# Patient Record
Sex: MALE | Race: AMERICAN INDIAN OR ALASKA NATIVE
[De-identification: names, ages, dates, MRNs, and addresses within clinical notes are randomized per-mention and may not be internally consistent; named-entity substitution may affect disease eponyms.]

---

## 2018-06-29 ENCOUNTER — HOSPITAL ENCOUNTER (EMERGENCY)
Dept: HOSPITAL 42 - ED | Age: 53
Discharge: HOME | End: 2018-06-29
Payer: MEDICAID

## 2018-06-29 VITALS — BODY MASS INDEX: 32.9 KG/M2

## 2018-06-29 VITALS — HEART RATE: 85 BPM | TEMPERATURE: 98 F | RESPIRATION RATE: 18 BRPM

## 2018-06-29 VITALS — OXYGEN SATURATION: 99 % | SYSTOLIC BLOOD PRESSURE: 170 MMHG | DIASTOLIC BLOOD PRESSURE: 65 MMHG

## 2018-06-29 DIAGNOSIS — J02.9: Primary | ICD-10-CM

## 2018-06-29 NOTE — ED PDOC
Arrival/HPI





- General


Time Seen by Provider: 06/29/18 18:34


Historian: Patient





- History of Present Illness


Narrative History of Present Illness (Text): 





06/29/18 18:35


53yo male with pmhx of hypertension who present with complaint of sore throat x 

one week. States sore throat started after having a flu a week ago. he states 

he saw his PMD for the sore throat and was given ASA which is not helping. 

Reports odynophagia. Denies dysphagia, hoarseness, drooling, fever, chills, 

abdominal pain, nausea, vomiting, any other complaint.





Past Medical History





- Provider Review


Nursing Documentation Reviewed: Yes





Family/Social History





- Physician Review


Nursing Documentation Reviewed: Yes


Family/Social History: Unknown Family HX





Allergies/Home Meds


Allergies/Adverse Reactions: 


Allergies





No Known Allergies Allergy (Verified 06/29/18 18:35)


 











Review of Systems





- Physician Review


All systems were reviewed & negative as marked: Yes





- Review of Systems


Constitutional: Normal


Eyes: Normal


ENT: Sore Throat.  absent: Tinnitus, TMJ Pain, Voice Changes, Epistaxis, Sinus 

Congestion


Respiratory: Normal


Cardiovascular: Normal


Gastrointestinal: Normal


Genitourinary Male: Normal


Musculoskeletal: Normal


Skin: Normal


Neurological: Normal


Endocrine: Normal


Hemo/Lymphatic: Normal


Psychiatric: Normal





Physical Exam


Vital Signs Reviewed: Yes


Vital Signs











  Temp Pulse Resp BP Pulse Ox


 


 06/29/18 19:34  98.0 F  85  18  170/65 H  99


 


 06/29/18 18:48  98.0 F  85  18  187/95 H  98











Temperature: Afebrile


Blood Pressure: Normal


Pulse: Regular


Respiratory Rate: Normal


Appearance: Positive for: Well-Appearing, Non-Toxic, Comfortable


Pain Distress: None


Mental Status: Positive for: Alert and Oriented X 3





- Systems Exam


Head: Present: Atraumatic, Normocephalic


Pupils: Present: PERRL


Extroacular Muscles: Present: EOMI


Conjunctiva: Present: Normal


Mouth: Present: Moist Mucous Membranes


Pharnyx: Present: Normal.  No: ERYTHEMA, EXUDATE, TONSILS ENLARGED, 

Peritonsilar Swelling, Uvular Deviation, Muffled/Hoarse Voice, Soft Palate/

Uvular Edema


Neck: Present: Normal Range of Motion


Respiratory/Chest: Present: Clear to Auscultation, Good Air Exchange.  No: 

Respiratory Distress, Accessory Muscle Use


Cardiovascular: Present: Regular Rate and Rhythm, Normal S1, S2.  No: Murmurs


Abdomen: No: Tenderness, Distention, Peritoneal Signs


Back: Present: Normal Inspection


Upper Extremity: Present: Normal Inspection.  No: Cyanosis, Edema


Lower Extremity: Present: Normal Inspection.  No: Edema


Neurological: Present: GCS=15, CN II-XII Intact, Speech Normal


Skin: Present: Warm, Dry, Normal Color.  No: Rashes


Psychiatric: Present: Alert, Oriented x 3, Normal Insight, Normal Concentration





Medical Decision Making


ED Course and Treatment: 





06/29/18 19:51


PT was not in any distress. He was controlling his secretions. No drooling. No 

trismus. No stridor. No neck pain. No meningeal sign in ED.





Rapid strep was negative


He was treated with viscus lido and dc home with magic mouth wash. Referred to 

ENT





- Lab Interpretations


Lab Results: 


 Lab Results





06/29/18 18:49: Grp A Beta Strep Ag Negative











- Medication Orders


Current Medication Orders: 











Discontinued Medications





Lidocaine HCl (Lidocaine 2% Viscous)  15 ml PO ONCE STA


   Stop: 06/29/18 19:26


   Last Admin: 06/29/18 19:33  Dose: 15 ml











Disposition/Present on Arrival





- Present on Arrival


Any Indicators Present on Arrival: No


History of DVT/PE: No


History of Uncontrolled Diabetes: No


Urinary Catheter: No


History of Decub. Ulcer: No


History Surgical Site Infection Following: None





- Disposition


Have Diagnosis and Disposition been Completed?: Yes


Diagnosis: 


 Sore throat





Disposition: HOME/ ROUTINE


Disposition Time: 19:30


Patient Plan: Discharge


Condition: STABLE


Discharge Instructions (ExitCare):  Sore Throat in Adults


Additional Instructions: 


Follow up with your doctor/ENT


Return to ED for any new or worsening symptoms


Prescriptions: 


Benzocaine [Mouth Pain] 14.7 ml MM BID #100 liquid


Referrals: 


Juvenal Amin DO [[facility]] - Follow up with primary

## 2018-11-29 NOTE — US
Date of service: 



11/29/2018



PROCEDURE:  Ultrasound of the Kidneys



HISTORY:

CKD



COMPARISON:

None available.



TECHNIQUE:

Sonogram of the kidneys.



FINDINGS:



RIGHT KIDNEY:

Measures: 9.2 x 5 x 5.15 cm. 



Diffuse increased echogenicity of the right kidney noted.  No 

evidence of hydronephrosis. 



No stone, solid mass lesion or hydronephrosis visualized. 



LEFT KIDNEY:

Measures: 8.1 x 4.6 x 4.1 cm. 



Diffuse increased echogenicity of the left kidney is also noted. 



No stone, solid mass lesion or hydronephrosis visualized. 



OTHER FINDINGS:

Incidentally noted is a heterogeneous echogenic mass lesion at the 

right liver lobe measures 6.7 x 5.5 x 5.9 centimeter.



IMPRESSION:

Bilateral echogenic kidneys suggestive of medical renal disease.  

There is no evidence of hydronephrosis.



Incidentally noted is possible mass lesion at the right liver lobe.  

Further assessment by CT is suggested if clinically warranted.

## 2018-11-29 NOTE — CP.PCM.HP
<Juan Pablo Stephens - Last Filed: 11/29/18 20:14>





History of Present Illness





- History of Present Illness


History of Present Illness: 





HISTORY & PHYSICAL NOTE FOR DR. RAFAEL Stephens PGY1





52 y/o M with PMHx of IDDM (diagnosed 30+ yrs ago), HTN (~3 yrs), HLD (~3yrs), 

CKD, chronic anemia presents to [facility] with complaints of shortness of breath that 

hes noticed for the past 3 days that worsened while laying down at night and 

walking. He also noticed increased abdominal distention, and worsening SOB when 

laying on stomach. He started noticing shortness of breath symptoms about 6 

months ago, but visited ED today as the SOB has significantly bothered him. He 

sleeps with 2 pillows at night. He also reports a fall about 3 days ago while 

jumping, however denies loc, dizziness, seizure like activity. He recently moved

to [place] from [place], was previously seeing a nephrologist and PMD, however has no

established care in [place]. He was told by his nephrologist about his worsening

creatinine, however has not underwent dialysis. He reports he has chronic 

anemia, and underwent a colonoscopy but doesnt recall results. He reports 

undergoing cardiac cath, and reports no lesions. He denies fevers, chills, 

headache, dizziness, nausea, vomiting, weight gain, chest pain, palpitations, 

cough, nausea, vomiting, diarrhea, hematochezia.





In the ED: patient had urinated ~700cc for urine studies. EKG revealed NSR, no 

peak T waves. 





PMHx: CKD, IDDM, HTN, HLD, Anemia 


All: NKDA


PSH: Unspecified nose surgery


SH: Former smoker: <1 pack/day x 10 years. Occasional beer. Lives alone, 

independent


FH: Mother: MI (51 y/o), Father: CABG, Daughter: Anemia


Meds: ASA 81mg po daily, Nifedipine ER 60mg po daily, losartan 100mg po daily, 

atorvastatin 80mg daily, humalin 70/30 40u bid, labetalol 100mg bid


PMD: Dr. Miguelangel Saunders ([facility])


Pharmacy: [facility]





Present on Admission





- Present on Admission


Any Indicators Present on Admission: No





Review of Systems





- Review of Systems


Review of Systems: 





per HPI





Past Patient History





- Infectious Disease


Hx of Infectious Diseases: None





- Past Social History


Smoking Status: Never Smoked





- CARDIAC


Hx Cardiac Disorders: Yes


Hx Hypertension: Yes





- PULMONARY


Hx Respiratory Disorders: No





- NEUROLOGICAL


Hx Neurological Disorder: No





- HEENT


Hx HEENT Problems: No





- RENAL


Hx Chronic Kidney Disease: No





- ENDOCRINE/METABOLIC


Hx Diabetes Mellitus Type 1: Yes





- HEMATOLOGICAL/ONCOLOGICAL


Hx Blood Disorders: No





- INTEGUMENTARY


Hx Dermatological Problems: No





- MUSCULOSKELETAL/RHEUMATOLOGICAL


Hx Musculoskeletal Disorders: No





- GASTROINTESTINAL


Hx Gastrointestinal Disorders: No





- GENITOURINARY/GYNECOLOGICAL


Hx Genitourinary Disorders: No





- PSYCHIATRIC


Hx Psychophysiologic Disorder: No


Hx Substance Use: No





- SURGICAL HISTORY


Hx Surgeries: No





- ANESTHESIA


Hx Anesthesia: No





Meds


Allergies/Adverse Reactions: 


                                    Allergies











Allergy/AdvReac Type Severity Reaction Status Date / Time


 


No Known Allergies Allergy   Verified 06/29/18 18:35














Physical Exam





- Constitutional


Appears: Well, Non-toxic, No Acute Distress





- Head Exam


Head Exam: ATRAUMATIC, NORMAL INSPECTION





- Eye Exam


Eye Exam: EOMI, Normal appearance





- ENT Exam


ENT Exam: Mucous Membranes Moist, Normal Exam





- Neck Exam


Neck exam: Positive for: Normal Inspection





- Respiratory Exam


Respiratory Exam: Clear to Auscultation Bilateral, NORMAL BREATHING PATTERN





- Cardiovascular Exam


Cardiovascular Exam: REGULAR RHYTHM, +S1, +S2





- GI/Abdominal Exam


GI & Abdominal Exam: Distended, Normal Bowel Sounds, Soft





- Extremities Exam


Extremities exam: Positive for: normal inspection.  Negative for: calf 

tenderness





- Back Exam


Back exam: NORMAL INSPECTION





- Neurological Exam


Neurological exam: Alert, Oriented x3





- Psychiatric Exam


Psychiatric exam: Normal Affect, Normal Mood





- Skin


Skin Exam: Dry, Intact, Warm





Results





- Vital Signs


Recent Vital Signs: 





                                Last Vital Signs











Temp  97.8 F   11/29/18 09:57


 


Pulse  95 H  11/29/18 09:57


 


Resp  18   11/29/18 10:14


 


BP  149/78   11/29/18 11:52


 


Pulse Ox  99   11/29/18 09:57














- Labs


Result Diagrams: 


                                 11/29/18 10:40





                                 11/29/18 10:40


Labs: 





                         Laboratory Results - last 24 hr











  11/29/18 11/29/18 11/29/18





  10:40 10:40 10:40


 


WBC  8.5  


 


RBC  2.49 L  


 


Hgb  7.5 L  


 


Hct  21.6 L  


 


MCV  86.7  


 


MCH  30.1  


 


MCHC  34.7  


 


RDW  12.5  


 


Plt Count  206  


 


MPV  11.1 H  


 


Gran %  73.5 H  


 


Lymph % (Auto)  17.8 L  


 


Mono % (Auto)  6.4 H  


 


Eos % (Auto)  2.2  


 


Baso % (Auto)  0.1  


 


Gran #  6.23  


 


Lymph # (Auto)  1.5  


 


Mono # (Auto)  0.5  


 


Eos # (Auto)  0.2  


 


Baso # (Auto)  0.01  


 


D-Dimer, Quantitative   707 H 


 


Sodium    138


 


Potassium    5.4 H


 


Chloride    109 H


 


Carbon Dioxide    18 L


 


Anion Gap    17


 


BUN    84 H


 


Creatinine    12.9 H*


 


Est GFR ( Amer)    5


 


Est GFR (Non-Af Amer)    4


 


Random Glucose    69 L


 


Calcium    5.6 L*


 


Magnesium    1.3 L


 


Total Bilirubin    0.6


 


AST    33


 


ALT    45


 


Alkaline Phosphatase    134 H


 


Lactate Dehydrogenase    1264 H


 


Total Creatine Kinase    3158 H


 


CK-MB (CK-2)    9.7 H


 


CK-MB (CK-2) %    0.3 L


 


Troponin I    0.03


 


NT-Pro-B Natriuret Pep    9500 H


 


Total Protein    6.9


 


Albumin    3.4


 


Globulin    3.5


 


Albumin/Globulin Ratio    1.0 L


 


Blood Type   


 


Antibody Screen   


 


Crossmatch   


 


BBK History Checked   














  11/29/18





  11:35


 


WBC 


 


RBC 


 


Hgb 


 


Hct 


 


MCV 


 


MCH 


 


MCHC 


 


RDW 


 


Plt Count 


 


MPV 


 


Gran % 


 


Lymph % (Auto) 


 


Mono % (Auto) 


 


Eos % (Auto) 


 


Baso % (Auto) 


 


Gran # 


 


Lymph # (Auto) 


 


Mono # (Auto) 


 


Eos # (Auto) 


 


Baso # (Auto) 


 


D-Dimer, Quantitative 


 


Sodium 


 


Potassium 


 


Chloride 


 


Carbon Dioxide 


 


Anion Gap 


 


BUN 


 


Creatinine 


 


Est GFR ( Amer) 


 


Est GFR (Non-Af Amer) 


 


Random Glucose 


 


Calcium 


 


Magnesium 


 


Total Bilirubin 


 


AST 


 


ALT 


 


Alkaline Phosphatase 


 


Lactate Dehydrogenase 


 


Total Creatine Kinase 


 


CK-MB (CK-2) 


 


CK-MB (CK-2) % 


 


Troponin I 


 


NT-Pro-B Natriuret Pep 


 


Total Protein 


 


Albumin 


 


Globulin 


 


Albumin/Globulin Ratio 


 


Blood Type  B POSITIVE


 


Antibody Screen  Negative


 


Crossmatch  See Detail


 


BBK History Checked  No verified bt














Assessment & Plan





- Assessment and Plan (Free Text)


Assessment: 





52 y/o M with PMHx CKD, IDDM, HTN, HLD admitted for hyperkalemia, chf 

exacerbation, rhabdomyolysis in the setting of acute on chronic kidney failure


Plan: 





Acute on chronic kidney failure


No acute need for RRT


NS@ 100mls/hr


start sodium bicarbonate 650po tid


consult nephrology, appreciate recs


Urine studies: electrolytes, 24hr creatinine, total protein, upep,  


Hepatitis panel


Vitamin D level, uric acid, 


Nephro consulted: recs appreciated


I&Os





r/o CHF Exacerbation


BNP elevated


Echo in am to r/o CHF





Hyperkalemia


No EKG changes


calcium gluconate 1g


Kayexalate stat





Anemia of chronic kidney disease


f/u iron studies


type & Screen


transfuse as necessary





Hx of HTN


continue home labetalol


continue home nifedipine





Hx of IDDM


sliding scale insulin





DVT ppx: SCD





Case seen, examined and discussed with attending physician, Dr. Lee











<Khadra Lee - Last Filed: 11/30/18 16:08>





Results





- Vital Signs


Recent Vital Signs: 





                                Last Vital Signs











Temp  98 F   11/30/18 12:03


 


Pulse  90   11/30/18 13:32


 


Resp  12   11/30/18 12:03


 


BP  185/90 H  11/30/18 13:32


 


Pulse Ox  96   11/30/18 12:03














- Labs


Result Diagrams: 


                                 11/30/18 06:00





                                 11/30/18 06:00


Labs: 





                         Laboratory Results - last 24 hr











  11/29/18 11/29/18 11/29/18





  15:37 16:20 16:34


 


WBC   


 


RBC   


 


Hgb   


 


Hct   


 


MCV   


 


MCH   


 


MCHC   


 


RDW   


 


Plt Count   


 


MPV   


 


Gran %   


 


Lymph % (Auto)   


 


Mono % (Auto)   


 


Eos % (Auto)   


 


Baso % (Auto)   


 


Gran #   


 


Lymph # (Auto)   


 


Mono # (Auto)   


 


Eos # (Auto)   


 


Baso # (Auto)   


 


Retic Count   


 


PT   


 


INR   


 


APTT   


 


Sodium   


 


Potassium   


 


Chloride   


 


Carbon Dioxide   


 


Anion Gap   


 


BUN   


 


Creatinine   


 


Est GFR ( Amer)   


 


Est GFR (Non-Af Amer)   


 


POC Glucose (mg/dL)   


 


Random Glucose   


 


Serum Osmolality   


 


Uric Acid   


 


Calcium   


 


Phosphorus   


 


Magnesium   


 


Iron    61


 


TIBC    278


 


% Saturation    22


 


Ferritin   


 


Total Bilirubin   


 


AST   


 


ALT   


 


Alkaline Phosphatase   


 


Total Creatine Kinase   


 


CK-MB (CK-2)   


 


CK-MB (CK-2) %   


 


Total Protein   


 


Total Protein (PEP)   


 


Albumin   


 


Albumin (PEP)   


 


Globulin   


 


Albumin/Globulin Ratio   


 


Alpha-1-Globulins   


 


Alpha-2-Globulins   


 


Beta-1-Globulin   


 


Beta-2-Globulin   


 


Gamma Globulins   


 


Abnorm Protein Band 1   


 


Abnorm Protein Band 2   


 


Abnorm Protein Band 3   


 


25-OH Vitamin D Total   


 


Urine Osmolality   180 L 


 


Ur Random Creatinine   45 


 


Ur Random Sodium   37 


 


FILEMON & SPEP Interp   


 


ADRIANA Screen   


 


ADRIANA Titer   


 


ADRIANA Titer 2   


 


ADRIANA Pattern   


 


ADRIANA Pattern 2   


 


Complement C3   


 


Complement C4   


 


Hepatitis A IgM Ab   


 


Hep Bs Antigen   


 


Hep B Core IgM Ab   


 


Hepatitis C Antibody   


 


HIV 1&2 Ag/Ab, 4th Gen   


 


Blood Type Confirm  B POSITIVE  














  11/29/18 11/29/18 11/29/18





  16:34 16:34 16:34


 


WBC   


 


RBC   


 


Hgb   


 


Hct   


 


MCV   


 


MCH   


 


MCHC   


 


RDW   


 


Plt Count   


 


MPV   


 


Gran %   


 


Lymph % (Auto)   


 


Mono % (Auto)   


 


Eos % (Auto)   


 


Baso % (Auto)   


 


Gran #   


 


Lymph # (Auto)   


 


Mono # (Auto)   


 


Eos # (Auto)   


 


Baso # (Auto)   


 


Retic Count   


 


PT   12.7 H 


 


INR   1.11 


 


APTT   29.5 


 


Sodium   


 


Potassium   


 


Chloride   


 


Carbon Dioxide   


 


Anion Gap   


 


BUN   


 


Creatinine   


 


Est GFR ( Amer)   


 


Est GFR (Non-Af Amer)   


 


POC Glucose (mg/dL)   


 


Random Glucose   


 


Serum Osmolality   


 


Uric Acid  7.6  


 


Calcium   


 


Phosphorus   


 


Magnesium   


 


Iron   


 


TIBC   


 


% Saturation   


 


Ferritin  93.1  


 


Total Bilirubin   


 


AST   


 


ALT   


 


Alkaline Phosphatase   


 


Total Creatine Kinase   


 


CK-MB (CK-2)   


 


CK-MB (CK-2) %   


 


Total Protein   


 


Total Protein (PEP)   


 


Albumin   


 


Albumin (PEP)   


 


Globulin   


 


Albumin/Globulin Ratio   


 


Alpha-1-Globulins   


 


Alpha-2-Globulins   


 


Beta-1-Globulin   


 


Beta-2-Globulin   


 


Gamma Globulins   


 


Abnorm Protein Band 1   


 


Abnorm Protein Band 2   


 


Abnorm Protein Band 3   


 


25-OH Vitamin D Total   


 


Urine Osmolality   


 


Ur Random Creatinine   


 


Ur Random Sodium   


 


FILEMON & SPEP Interp   


 


ADRIANA Screen   


 


ADRIANA Titer   


 


ADRIANA Titer 2   


 


ADRIANA Pattern   


 


ADRIANA Pattern 2   


 


Complement C3   


 


Complement C4   


 


Hepatitis A IgM Ab    Negative


 


Hep Bs Antigen    Negative


 


Hep B Core IgM Ab    Negative


 


Hepatitis C Antibody    Negative


 


HIV 1&2 Ag/Ab, 4th Gen   


 


Blood Type Confirm   














  11/29/18 11/29/18 11/29/18





  16:34 16:34 16:34


 


WBC   


 


RBC   


 


Hgb   


 


Hct   


 


MCV   


 


MCH   


 


MCHC   


 


RDW   


 


Plt Count   


 


MPV   


 


Gran %   


 


Lymph % (Auto)   


 


Mono % (Auto)   


 


Eos % (Auto)   


 


Baso % (Auto)   


 


Gran #   


 


Lymph # (Auto)   


 


Mono # (Auto)   


 


Eos # (Auto)   


 


Baso # (Auto)   


 


Retic Count   


 


PT   


 


INR   


 


APTT   


 


Sodium   


 


Potassium   


 


Chloride   


 


Carbon Dioxide   


 


Anion Gap   


 


BUN   


 


Creatinine   


 


Est GFR ( Amer)   


 


Est GFR (Non-Af Amer)   


 


POC Glucose (mg/dL)   


 


Random Glucose   


 


Serum Osmolality   314 H 


 


Uric Acid   


 


Calcium   


 


Phosphorus   


 


Magnesium   


 


Iron   


 


TIBC   


 


% Saturation   


 


Ferritin   


 


Total Bilirubin   


 


AST   


 


ALT   


 


Alkaline Phosphatase   


 


Total Creatine Kinase   


 


CK-MB (CK-2)   


 


CK-MB (CK-2) %   


 


Total Protein   


 


Total Protein (PEP)    5.7 L


 


Albumin   


 


Albumin (PEP)    2.7 L


 


Globulin   


 


Albumin/Globulin Ratio   


 


Alpha-1-Globulins    0.4 H


 


Alpha-2-Globulins    0.8


 


Beta-1-Globulin    0.3 L


 


Beta-2-Globulin    0.3


 


Gamma Globulins    1.1


 


Abnorm Protein Band 1    TEST NOT PERFORMED


 


Abnorm Protein Band 2    TEST NOT PERFORMED


 


Abnorm Protein Band 3    TEST NOT PERFORMED


 


25-OH Vitamin D Total  < 12.8 L  


 


Urine Osmolality   


 


Ur Random Creatinine   


 


Ur Random Sodium   


 


FILEMON & SPEP Interp    See note


 


ADRIANA Screen   


 


ADRIANA Titer   


 


ADRIANA Titer 2   


 


ADRIANA Pattern   


 


ADRIANA Pattern 2   


 


Complement C3   


 


Complement C4   


 


Hepatitis A IgM Ab   


 


Hep Bs Antigen   


 


Hep B Core IgM Ab   


 


Hepatitis C Antibody   


 


HIV 1&2 Ag/Ab, 4th Gen   


 


Blood Type Confirm   














  11/29/18 11/30/18 11/30/18





  16:34 05:33 06:00


 


WBC    7.8


 


RBC    2.34 L


 


Hgb    7.0 L


 


Hct    20.4 L*


 


MCV    87.2


 


MCH    29.9


 


MCHC    34.3


 


RDW    12.9


 


Plt Count    194


 


MPV    12.1 H


 


Gran %    76.9 H


 


Lymph % (Auto)    15.6 L


 


Mono % (Auto)    5.3


 


Eos % (Auto)    2.1


 


Baso % (Auto)    0.1


 


Gran #    5.96


 


Lymph # (Auto)    1.2


 


Mono # (Auto)    0.4


 


Eos # (Auto)    0.2


 


Baso # (Auto)    0.01


 


Retic Count    2.37 H


 


PT   


 


INR   


 


APTT   


 


Sodium   


 


Potassium   


 


Chloride   


 


Carbon Dioxide   


 


Anion Gap   


 


BUN   


 


Creatinine   


 


Est GFR ( Amer)   


 


Est GFR (Non-Af Amer)   


 


POC Glucose (mg/dL)   122 H 


 


Random Glucose   


 


Serum Osmolality   


 


Uric Acid   


 


Calcium   


 


Phosphorus   


 


Magnesium   


 


Iron   


 


TIBC   


 


% Saturation   


 


Ferritin   


 


Total Bilirubin   


 


AST   


 


ALT   


 


Alkaline Phosphatase   


 


Total Creatine Kinase   


 


CK-MB (CK-2)   


 


CK-MB (CK-2) %   


 


Total Protein   


 


Total Protein (PEP)   


 


Albumin   


 


Albumin (PEP)   


 


Globulin   


 


Albumin/Globulin Ratio   


 


Alpha-1-Globulins   


 


Alpha-2-Globulins   


 


Beta-1-Globulin   


 


Beta-2-Globulin   


 


Gamma Globulins   


 


Abnorm Protein Band 1   


 


Abnorm Protein Band 2   


 


Abnorm Protein Band 3   


 


25-OH Vitamin D Total   


 


Urine Osmolality   


 


Ur Random Creatinine   


 


Ur Random Sodium   


 


FILEMON & SPEP Interp   


 


ADRIANA Screen  Negative  


 


ADRIANA Titer  TEST NOT PERFORMED  


 


ADRIANA Titer 2  TEST NOT PERFORMED  


 


ADRIANA Pattern  TEST NOT PERFORMED  


 


ADRIANA Pattern 2  TEST NOT PERFORMED  


 


Complement C3   


 


Complement C4   


 


Hepatitis A IgM Ab   


 


Hep Bs Antigen   


 


Hep B Core IgM Ab   


 


Hepatitis C Antibody   


 


HIV 1&2 Ag/Ab, 4th Gen  Nonreactive  


 


Blood Type Confirm   














  11/30/18 11/30/18 11/30/18





  06:00 06:00 06:00


 


WBC   


 


RBC   


 


Hgb   


 


Hct   


 


MCV   


 


MCH   


 


MCHC   


 


RDW   


 


Plt Count   


 


MPV   


 


Gran %   


 


Lymph % (Auto)   


 


Mono % (Auto)   


 


Eos % (Auto)   


 


Baso % (Auto)   


 


Gran #   


 


Lymph # (Auto)   


 


Mono # (Auto)   


 


Eos # (Auto)   


 


Baso # (Auto)   


 


Retic Count   


 


PT  13.1 H  


 


INR  1.14  


 


APTT   


 


Sodium   137 


 


Potassium   5.4 H 


 


Chloride   110 H 


 


Carbon Dioxide   18 L 


 


Anion Gap   14 


 


BUN   81 H 


 


Creatinine   12.7 H* 


 


Est GFR ( Amer)   5 


 


Est GFR (Non-Af Amer)   4 


 


POC Glucose (mg/dL)   


 


Random Glucose   123 H 


 


Serum Osmolality   


 


Uric Acid   


 


Calcium   5.7 L* 


 


Phosphorus    6.9 H


 


Magnesium   


 


Iron   


 


TIBC   


 


% Saturation   


 


Ferritin    94.2


 


Total Bilirubin   0.5 


 


AST   30 


 


ALT   35 


 


Alkaline Phosphatase   118 


 


Total Creatine Kinase    2713 H


 


CK-MB (CK-2)    7.1 H


 


CK-MB (CK-2) %    0.3 L


 


Total Protein   6.5 


 


Total Protein (PEP)   


 


Albumin   3.1 


 


Albumin (PEP)   


 


Globulin   3.4 


 


Albumin/Globulin Ratio   0.9 L 


 


Alpha-1-Globulins   


 


Alpha-2-Globulins   


 


Beta-1-Globulin   


 


Beta-2-Globulin   


 


Gamma Globulins   


 


Abnorm Protein Band 1   


 


Abnorm Protein Band 2   


 


Abnorm Protein Band 3   


 


25-OH Vitamin D Total   


 


Urine Osmolality   


 


Ur Random Creatinine   


 


Ur Random Sodium   


 


FILEMON & SPEP Interp   


 


ADRIAAN Screen   


 


ADRIANA Titer   


 


ADRIANA Titer 2   


 


ADRIANA Pattern   


 


ADRIANA Pattern 2   


 


Complement C3   


 


Complement C4   


 


Hepatitis A IgM Ab   


 


Hep Bs Antigen   


 


Hep B Core IgM Ab   


 


Hepatitis C Antibody   


 


HIV 1&2 Ag/Ab, 4th Gen   


 


Blood Type Confirm   














  11/30/18 11/30/18 11/30/18





  06:00 06:00 06:00


 


WBC   


 


RBC   


 


Hgb   


 


Hct   


 


MCV   


 


MCH   


 


MCHC   


 


RDW   


 


Plt Count   


 


MPV   


 


Gran %   


 


Lymph % (Auto)   


 


Mono % (Auto)   


 


Eos % (Auto)   


 


Baso % (Auto)   


 


Gran #   


 


Lymph # (Auto)   


 


Mono # (Auto)   


 


Eos # (Auto)   


 


Baso # (Auto)   


 


Retic Count   


 


PT   


 


INR   


 


APTT   


 


Sodium   


 


Potassium   


 


Chloride   


 


Carbon Dioxide   


 


Anion Gap   


 


BUN   


 


Creatinine   


 


Est GFR ( Amer)   


 


Est GFR (Non-Af Amer)   


 


POC Glucose (mg/dL)   


 


Random Glucose   


 


Serum Osmolality   


 


Uric Acid   


 


Calcium   


 


Phosphorus    6.9 H


 


Magnesium    1.6 L


 


Iron  64  


 


TIBC  265  


 


% Saturation  24  


 


Ferritin   


 


Total Bilirubin   


 


AST   


 


ALT   


 


Alkaline Phosphatase   


 


Total Creatine Kinase   


 


CK-MB (CK-2)   


 


CK-MB (CK-2) %   


 


Total Protein   


 


Total Protein (PEP)   


 


Albumin   


 


Albumin (PEP)   


 


Globulin   


 


Albumin/Globulin Ratio   


 


Alpha-1-Globulins   


 


Alpha-2-Globulins   


 


Beta-1-Globulin   


 


Beta-2-Globulin   


 


Gamma Globulins   


 


Abnorm Protein Band 1   


 


Abnorm Protein Band 2   


 


Abnorm Protein Band 3   


 


25-OH Vitamin D Total   


 


Urine Osmolality   


 


Ur Random Creatinine   


 


Ur Random Sodium   


 


FILEMON & SPEP Interp   


 


ADRIANA Screen   


 


ADRIANA Titer   


 


ADRIANA Titer 2   


 


ADRIANA Pattern   


 


ADRIANA Pattern 2   


 


Complement C3   99.0 


 


Complement C4   32.9 


 


Hepatitis A IgM Ab   


 


Hep Bs Antigen   


 


Hep B Core IgM Ab   


 


Hepatitis C Antibody   


 


HIV 1&2 Ag/Ab, 4th Gen   


 


Blood Type Confirm   














  11/30/18 11/30/18





  07:43 12:37


 


WBC  


 


RBC  


 


Hgb  


 


Hct  


 


MCV  


 


MCH  


 


MCHC  


 


RDW  


 


Plt Count  


 


MPV  


 


Gran %  


 


Lymph % (Auto)  


 


Mono % (Auto)  


 


Eos % (Auto)  


 


Baso % (Auto)  


 


Gran #  


 


Lymph # (Auto)  


 


Mono # (Auto)  


 


Eos # (Auto)  


 


Baso # (Auto)  


 


Retic Count  


 


PT  


 


INR  


 


APTT  


 


Sodium  


 


Potassium  


 


Chloride  


 


Carbon Dioxide  


 


Anion Gap  


 


BUN  


 


Creatinine  


 


Est GFR ( Amer)  


 


Est GFR (Non-Af Amer)  


 


POC Glucose (mg/dL)  100  113 H


 


Random Glucose  


 


Serum Osmolality  


 


Uric Acid  


 


Calcium  


 


Phosphorus  


 


Magnesium  


 


Iron  


 


TIBC  


 


% Saturation  


 


Ferritin  


 


Total Bilirubin  


 


AST  


 


ALT  


 


Alkaline Phosphatase  


 


Total Creatine Kinase  


 


CK-MB (CK-2)  


 


CK-MB (CK-2) %  


 


Total Protein  


 


Total Protein (PEP)  


 


Albumin  


 


Albumin (PEP)  


 


Globulin  


 


Albumin/Globulin Ratio  


 


Alpha-1-Globulins  


 


Alpha-2-Globulins  


 


Beta-1-Globulin  


 


Beta-2-Globulin  


 


Gamma Globulins  


 


Abnorm Protein Band 1  


 


Abnorm Protein Band 2  


 


Abnorm Protein Band 3  


 


25-OH Vitamin D Total  


 


Urine Osmolality  


 


Ur Random Creatinine  


 


Ur Random Sodium  


 


FILEMON & SPEP Interp  


 


ADRIANA Screen  


 


ADRIANA Titer  


 


ADRIANA Titer 2  


 


ADRIANA Pattern  


 


ADRIANA Pattern 2  


 


Complement C3  


 


Complement C4  


 


Hepatitis A IgM Ab  


 


Hep Bs Antigen  


 


Hep B Core IgM Ab  


 


Hepatitis C Antibody  


 


HIV 1&2 Ag/Ab, 4th Gen  


 


Blood Type Confirm  














Attending/Attestation





- Attestation


I have personally seen and examined this patient.: Yes


I have fully participated in the care of the patient.: Yes


I have reviewed all pertinent clinical information: Yes


Notes (Text): 





11/30/18 16:01





Attending note;


Patient seen and examined with the resident in ER.


Patient is alert and awake.


Denies any chest pain.


Complaining of exertional dyspnea for the past 3 months.


Denies any fevers, chills, cough.


Denies any urinary, bowel symptoms.





 He recently moved to [place] from [place]  6 months ago.


Follow-up for the past 6 months.





Patient is a 53 year old male with PMHx of IDDM (diagnosed 30+ yrs ago), HTN (~3

yrs), HLD (~3yrs), CKD, chronic anemia presents to [facility] with complaints of 

shortness of breath that hes noticed for the past 3 days that worsened while 

laying down at night and walking.





1. Chronic kidney disease; baseline creatinine is not known.


Currently with creatinine of 12.9. Case discussed with nephrologist in detail.


Urine studies ordered.


Workup for chronic kidney disease initiated.


2. Hyperkalemia; Kayexalate given. Started on low potassium diet.


Hypercalcemia and hyperphosphatemia  due to renal insufficiency.


3. Shortness of breath; clinically not in CHF. Echocardiogram ordered. Cardiac 

enzymes ordered. Cardiology evaluation requested.


4. severe anemia; mostly secondary to Chronic kidney disease. Monitor closely. 

Transfuse as needed.


5. Renal ultrasound ordered.


6. Possibility of dialysis explained in detail.


7. Hypertension; continue Procardia and labetalol. ACE inhibitor on hold 

secondary to hyperkalemia.





The diagnosis, treatment plan discussed with patient in detail.


Patient will be referred to [facility] upon discharge.

## 2018-11-29 NOTE — RAD
Date of service: 



11/29/2018



PROCEDURE:  Right Ankle Radiographs.



HISTORY:

 s/p fall - r/o fx 



COMPARISON:

None available.



FINDINGS:



BONES:

Normal. No fracture. 



JOINTS:

Normal. No osteoarthritis. Ankle mortise maintained. Talar dome intact



SOFT TISSUES:

Normal. 



OTHER FINDINGS:

None.



IMPRESSION:

Normal right ankle radiographs.

## 2018-11-29 NOTE — CP.PCM.CON
<Jefry Carnes - Last Filed: 11/29/18 22:17>





History of Present Illness





- History of Present Illness


History of Present Illness: 





Nephro Consult Note for Dr. Peña service





This is a 52 yo M with PMH of IDDM (diagnosed 30+ yrs ago), HTN (~3 yrs), HLD 

(~3yrs), CKD, and chronic anemia presents to [facility] with complaints of shortness of

breath that hes noticed for the past 3 days that worsened while laying down at 

night and walking.  Nephro was consulted for possible HD as patient was found to

have Cr of 12.9, extensive electrolyte abnormalities, and distended abdomen.  As

per patient, he has had a hx of CKD, which he reports that his prior physician 

reported that his kidney "numbers" were worsening.  Denies decreasing urine 

output, anuria, hematuria, diarrhea.  Does admit to foamy urine (approx 400cc in

bedside urinal, clear yellow and foamy in appearance) for over 2 yrs.  Reports 

compliance with medications and follow up, but when asked what interventions and

medications were used to manage his worsening kidney numbers, he is unable to 

provide a response.  Admits to poor dietary compliance.  He does also report a 

recent fall off a trailer at home, fell onto his arms and left hip from 

approximately 4 feet up.  Reports no issues ambulating, but labs are concerning 

for rhabdomyolysis.





At time of exam in ED, labs were concerning for Ca corrected to 6, K 5.4, Cr 

12.9, elevated CPK, and elevated BNP.





12-system ROS reviewed and negative, except as above.





Review of Systems





- Review of Systems


All systems: reviewed and no additional remarkable complaints except (as per 

HPI)





Past Patient History





- Infectious Disease


Hx of Infectious Diseases: None





- Past Social History


Smoking Status: Never Smoked





- CARDIAC


Hx Cardiac Disorders: Yes


Hx Hypertension: Yes





- PULMONARY


Hx Respiratory Disorders: No





- NEUROLOGICAL


Hx Neurological Disorder: No





- HEENT


Hx HEENT Problems: No





- RENAL


Hx Chronic Kidney Disease: No





- ENDOCRINE/METABOLIC


Hx Diabetes Mellitus Type 1: Yes





- HEMATOLOGICAL/ONCOLOGICAL


Hx Blood Disorders: No





- INTEGUMENTARY


Hx Dermatological Problems: No





- MUSCULOSKELETAL/RHEUMATOLOGICAL


Hx Musculoskeletal Disorders: No





- GASTROINTESTINAL


Hx Gastrointestinal Disorders: No





- GENITOURINARY/GYNECOLOGICAL


Hx Genitourinary Disorders: No





- PSYCHIATRIC


Hx Psychophysiologic Disorder: No


Hx Substance Use: No





- SURGICAL HISTORY


Hx Surgeries: No





- ANESTHESIA


Hx Anesthesia: No





Meds


Allergies/Adverse Reactions: 


                                    Allergies











Allergy/AdvReac Type Severity Reaction Status Date / Time


 


No Known Allergies Allergy   Verified 06/29/18 18:35














- Medications


Medications: 


                               Current Medications





Sodium Chloride (Sodium Chloride 0.9%)  1,000 mls @ 100 mls/hr IV .Q10H [facility]


Sodium Chloride (Sodium Chloride 0.9%)  1,000 mls @ 100 mls/hr IV .Q10H [facility]


Calcium Gluconate 1,000 mg/ (Sodium Chloride)  110 mls @ 110 mls/hr IVPB ONCE 

ONE


   Stop: 11/29/18 16:29


Magnesium Sulfate (Magnesium Sulfate 2 Gm/50 Ml Water)  2 gm in 50 mls @ 50 

mls/hr IVPB ONCE ONE


   Stop: 11/29/18 16:46


Insulin Human Regular (Humulin R Low)  0 units SC ACHS [facility]; Protocol


Labetalol HCl (Trandate)  100 mg PO BID [facility]


Nifedipine (Procardia Xl)  60 mg PO DAILY [facility]


Sodium Bicarbonate (Sodium Bicarbonate Tab)  650 mg PO TID [facility]











Physical Exam





- Additional Findings


Additional findings: 





- Constitutional


Appears: No Acute Distress, Ill-appearing





- Head Exam


Head Exam: ATRAUMATIC, NORMAL INSPECTION





- Eye Exam


Eye Exam: EOMI, Normal appearance, No scleral icterus or conjunctival injection





- ENT Exam


ENT Exam: Mucous Membranes Moist, Normal Exam





- Neck Exam


Neck exam: Normal ROM, no Lymphadenopathy





- Respiratory Exam


Respiratory Exam: Mild bibasilar rales but otherwise clear to auscultation, 

NORMAL BREATHING PATTERN, No respiratory distress





- Cardiovascular Exam


Cardiovascular Exam: REGULAR RHYTHM, +S1, +S2





- GI/Abdominal Exam


GI & Abdominal Exam: Distended, Firm but not Rigid, Normal Bowel Sounds, No 

tenderness to palpation





- Extremities Exam


Extremities exam: Positive for: Trace pitting edema in bilateral feet to ankles.

 Negative for: calf tenderness





- Back Exam


Back exam: NORMAL INSPECTION, negative for CVA tenderness bilaterally





- Neurological Exam


Neurological exam: Awake and alert, following all commands, moving all 

extremities spontaneously





- Psychiatric Exam


Psychiatric exam: Normal Affect, Normal Mood, Poor insight into condition





- Skin


Skin Exam: Dry, Intact, Warm





Results





- Vital Signs


Recent Vital Signs: 


                                Last Vital Signs











Temp  97.8 F   11/29/18 09:57


 


Pulse  95 H  11/29/18 09:57


 


Resp  18   11/29/18 10:14


 


BP  149/78   11/29/18 11:52


 


Pulse Ox  99   11/29/18 09:57














- Labs


Result Diagrams: 


                                 11/29/18 10:40





                                 11/29/18 10:40


Labs: 


                         Laboratory Results - last 24 hr











  11/29/18 11/29/18 11/29/18





  10:40 10:40 10:40


 


WBC  8.5  


 


RBC  2.49 L  


 


Hgb  7.5 L  


 


Hct  21.6 L  


 


MCV  86.7  


 


MCH  30.1  


 


MCHC  34.7  


 


RDW  12.5  


 


Plt Count  206  


 


MPV  11.1 H  


 


Gran %  73.5 H  


 


Lymph % (Auto)  17.8 L  


 


Mono % (Auto)  6.4 H  


 


Eos % (Auto)  2.2  


 


Baso % (Auto)  0.1  


 


Gran #  6.23  


 


Lymph # (Auto)  1.5  


 


Mono # (Auto)  0.5  


 


Eos # (Auto)  0.2  


 


Baso # (Auto)  0.01  


 


D-Dimer, Quantitative   707 H 


 


Sodium    138


 


Potassium    5.4 H


 


Chloride    109 H


 


Carbon Dioxide    18 L


 


Anion Gap    17


 


BUN    84 H


 


Creatinine    12.9 H*


 


Est GFR ( Amer)    5


 


Est GFR (Non-Af Amer)    4


 


Random Glucose    69 L


 


Calcium    5.6 L*


 


Magnesium    1.3 L


 


Total Bilirubin    0.6


 


AST    33


 


ALT    45


 


Alkaline Phosphatase    134 H


 


Lactate Dehydrogenase    1264 H


 


Total Creatine Kinase    3158 H


 


CK-MB (CK-2)    9.7 H


 


CK-MB (CK-2) %    0.3 L


 


Troponin I    0.03


 


NT-Pro-B Natriuret Pep    9500 H


 


Total Protein    6.9


 


Albumin    3.4


 


Globulin    3.5


 


Albumin/Globulin Ratio    1.0 L


 


Blood Type   


 


Blood Type Confirm   


 


Antibody Screen   


 


Crossmatch   


 


BBK History Checked   














  11/29/18 11/29/18





  11:35 15:37


 


WBC  


 


RBC  


 


Hgb  


 


Hct  


 


MCV  


 


MCH  


 


MCHC  


 


RDW  


 


Plt Count  


 


MPV  


 


Gran %  


 


Lymph % (Auto)  


 


Mono % (Auto)  


 


Eos % (Auto)  


 


Baso % (Auto)  


 


Gran #  


 


Lymph # (Auto)  


 


Mono # (Auto)  


 


Eos # (Auto)  


 


Baso # (Auto)  


 


D-Dimer, Quantitative  


 


Sodium  


 


Potassium  


 


Chloride  


 


Carbon Dioxide  


 


Anion Gap  


 


BUN  


 


Creatinine  


 


Est GFR ( Amer)  


 


Est GFR (Non-Af Amer)  


 


Random Glucose  


 


Calcium  


 


Magnesium  


 


Total Bilirubin  


 


AST  


 


ALT  


 


Alkaline Phosphatase  


 


Lactate Dehydrogenase  


 


Total Creatine Kinase  


 


CK-MB (CK-2)  


 


CK-MB (CK-2) %  


 


Troponin I  


 


NT-Pro-B Natriuret Pep  


 


Total Protein  


 


Albumin  


 


Globulin  


 


Albumin/Globulin Ratio  


 


Blood Type  B POSITIVE 


 


Blood Type Confirm   B POSITIVE


 


Antibody Screen  Negative 


 


Crossmatch  See Detail 


 


BBK History Checked  No verified bt 














Assessment & Plan





- Assessment and Plan (Free Text)


Assessment: 





This is a 52 yo M with PMH of IDDM (diagnosed 30+ yrs ago), HTN (~3 yrs), HLD 

(~3yrs), CKD, and chronic anemia presents to [facility] with complaints of shortness of

breath that hes noticed for the past 3 days that worsened while laying down at 

night and walking.  Nephro was consulted for possible HD as patient was found to

have Cr of 12.9, extensive electrolyte abnormalities, and distended abdomen.


Plan: 





Acute renal failure overlying CKD


-etiology unclear, may be multifactorial


   likely Rhabdomyolysis component, given elevated CPK s/p fall from elevated 

position


-Avoid nephrotoxic drugs, hold all ACEi/ARBs


-Consider gentle hydration


-Agree with workup panel ordered by primary team


-Careful repletion of low electrolytes in setting of ARF to avoid toxic 

accumulation; recheck calcium after calcium gluconate x1 given ED


   Agree with calcium gluconate to replete Ca and stabilize cardiac membrane


-No need for urgent dialysis at this time, not a dangerous accumulation of K; 

continue to monitor





Reviewed and discussed with attending, Dr. Peña





<Floyd Peña S - Last Filed: 12/01/18 18:00>





Meds





- Medications


Medications: 


                               Current Medications





Acetaminophen (Tylenol 325mg Tab)  650 mg PO Q4 PRN


   PRN Reason: Pain, Mild (1-3)


   Last Admin: 11/30/18 20:06 Dose:  650 mg


Calcitriol (Rocaltrol)  0.5 mcg PO DAILY [facility]


   Last Admin: 12/01/18 12:40 Dose:  0.5 mcg


Calcium Acetate (Phoslo)  667 mg PO WM [facility]


   Last Admin: 12/01/18 17:25 Dose:  667 mg


Clonidine HCl (Catapres)  0.1 mg PO BID PRN


   PRN Reason: Systolic Blood Pressure


   Last Admin: 11/30/18 13:32 Dose:  0.1 mg


Insulin Human Regular (Humulin R Med)  0 units SC ACHS [facility]; Protocol


   Last Admin: 12/01/18 17:24 Dose:  3 units


Labetalol HCl (Trandate)  200 mg PO BID [facility]


   Last Admin: 12/01/18 17:25 Dose:  200 mg


Nifedipine (Procardia Xl)  60 mg PO DAILY [facility]


   Last Admin: 12/01/18 12:42 Dose:  60 mg


Ondansetron HCl (Zofran Inj)  4 mg IVP Q6H PRN


   PRN Reason: Nausea/Vomiting


Sodium Bicarbonate (Sodium Bicarbonate Tab)  650 mg PO TID [facility]


   Last Admin: 12/01/18 17:25 Dose:  650 mg


Sodium Polystyrene Sulfonate (Kayexalate Susp)  15 gm PO DAILY [facility]


   Last Admin: 12/01/18 10:00 Dose:  Not Given


Vitamin D (Vitamin D 400 Intl Units Tab)  1,200 intlu PO DAILY [facility]


   Last Admin: 12/01/18 12:42 Dose:  Not Given











Results





- Vital Signs


Recent Vital Signs: 


                                Last Vital Signs











Temp  97.8 F   12/01/18 12:00


 


Pulse  87   12/01/18 14:00


 


Resp  18   12/01/18 12:00


 


BP  155/63 H  12/01/18 12:41


 


Pulse Ox  98   11/30/18 17:04














- Labs


Result Diagrams: 


                                 12/01/18 07:00





                                 12/01/18 07:00


Labs: 


                         Laboratory Results - last 24 hr











  11/29/18 11/30/18 11/30/18





  11:35 06:00 06:00


 


WBC   


 


RBC   


 


Hgb   


 


Hct   


 


MCV   


 


MCH   


 


MCHC   


 


RDW   


 


Plt Count   


 


MPV   


 


Gran %   


 


Lymph % (Auto)   


 


Mono % (Auto)   


 


Eos % (Auto)   


 


Baso % (Auto)   


 


Gran #   


 


Lymph # (Auto)   


 


Mono # (Auto)   


 


Eos # (Auto)   


 


Baso # (Auto)   


 


Hemoglobinopathy Red Blood Count   2.31 L 


 


Hemoglobinopathy Hct   21.3 L 


 


Hemoglobinopathy Hgb   7.0 L 


 


Hemoglobinopathy MCV   91.9 


 


Hemoglobinopathy MCH   30.1 


 


Hemoglobinopathy RDW   13.1 


 


Sodium   


 


Potassium   


 


Chloride   


 


Carbon Dioxide   


 


Anion Gap   


 


BUN   


 


Creatinine   


 


Est GFR ( Amer)   


 


Est GFR (Non-Af Amer)   


 


POC Glucose (mg/dL)   


 


Random Glucose   


 


Calcium   


 


Total Bilirubin   


 


AST   


 


ALT   


 


Alkaline Phosphatase   


 


Lactate Dehydrogenase   


 


Total Creatine Kinase   


 


CK-MB (CK-2)   


 


CK-MB (CK-2) %   


 


Troponin I   


 


Total Protein   


 


Albumin   


 


Globulin   


 


Albumin/Globulin Ratio   


 


SS-A Antibody    <1.0


 


SS-B Ab Interp    Negative


 


SS-B Antibody    <1.0


 


Sm (Whiting) Antibody   


 


Anti-Smith Interpret   


 


Double Strand DNA Ab   


 


Blood Type  B POSITIVE  


 


Antibody Screen  Negative  


 


Crossmatch  See Detail  


 


BBK History Checked  No verified bt  














  11/30/18 11/30/18 12/01/18





  06:00 22:16 07:00


 


WBC   


 


RBC   


 


Hgb   


 


Hct   


 


MCV   


 


MCH   


 


MCHC   


 


RDW   


 


Plt Count   


 


MPV   


 


Gran %   


 


Lymph % (Auto)   


 


Mono % (Auto)   


 


Eos % (Auto)   


 


Baso % (Auto)   


 


Gran #   


 


Lymph # (Auto)   


 


Mono # (Auto)   


 


Eos # (Auto)   


 


Baso # (Auto)   


 


Hemoglobinopathy Red Blood Count   


 


Hemoglobinopathy Hct   


 


Hemoglobinopathy Hgb   


 


Hemoglobinopathy MCV   


 


Hemoglobinopathy MCH   


 


Hemoglobinopathy RDW   


 


Sodium    139


 


Potassium    4.7


 


Chloride    109 H


 


Carbon Dioxide    20 L


 


Anion Gap    15


 


BUN    81 H


 


Creatinine    12.3 H*


 


Est GFR ( Amer)    5


 


Est GFR (Non-Af Amer)    4


 


POC Glucose (mg/dL)   184 H 


 


Random Glucose    129 H


 


Calcium    6.1 L*


 


Total Bilirubin    0.4


 


AST    27


 


ALT    36


 


Alkaline Phosphatase    119


 


Lactate Dehydrogenase    994 H


 


Total Creatine Kinase    2318 H


 


CK-MB (CK-2)    6.9 H


 


CK-MB (CK-2) %    0.3 L


 


Troponin I    0.03


 


Total Protein    6.6


 


Albumin    3.2


 


Globulin    3.5


 


Albumin/Globulin Ratio    0.9 L


 


SS-A Antibody   


 


SS-B Ab Interp   


 


SS-B Antibody   


 


Sm (Whiting) Antibody  <1.0  


 


Anti-Smith Interpret  Negative  


 


Double Strand DNA Ab  <1  


 


Blood Type   


 


Antibody Screen   


 


Crossmatch   


 


BBK History Checked   














  12/01/18 12/01/18 12/01/18





  07:00 08:03 11:51


 


WBC  8.1  


 


RBC  2.31 L  


 


Hgb  7.0 L  


 


Hct  20.6 L*  


 


MCV  89.2  


 


MCH  30.3  


 


MCHC  34.0  


 


RDW  11.9  


 


Plt Count  197  


 


MPV  12.4 H  


 


Gran %  73.0 H  


 


Lymph % (Auto)  16.9 L  


 


Mono % (Auto)  6.7 H  


 


Eos % (Auto)  3.2  


 


Baso % (Auto)  0.2  


 


Gran #  5.90  


 


Lymph # (Auto)  1.4  


 


Mono # (Auto)  0.5  


 


Eos # (Auto)  0.3  


 


Baso # (Auto)  0.02  


 


Hemoglobinopathy Red Blood Count   


 


Hemoglobinopathy Hct   


 


Hemoglobinopathy Hgb   


 


Hemoglobinopathy MCV   


 


Hemoglobinopathy MCH   


 


Hemoglobinopathy RDW   


 


Sodium   


 


Potassium   


 


Chloride   


 


Carbon Dioxide   


 


Anion Gap   


 


BUN   


 


Creatinine   


 


Est GFR ( Amer)   


 


Est GFR (Non-Af Amer)   


 


POC Glucose (mg/dL)   151 H  139 H


 


Random Glucose   


 


Calcium   


 


Total Bilirubin   


 


AST   


 


ALT   


 


Alkaline Phosphatase   


 


Lactate Dehydrogenase   


 


Total Creatine Kinase   


 


CK-MB (CK-2)   


 


CK-MB (CK-2) %   


 


Troponin I   


 


Total Protein   


 


Albumin   


 


Globulin   


 


Albumin/Globulin Ratio   


 


SS-A Antibody   


 


SS-B Ab Interp   


 


SS-B Antibody   


 


Sm (Whiting) Antibody   


 


Anti-Smith Interpret   


 


Double Strand DNA Ab   


 


Blood Type   


 


Antibody Screen   


 


Crossmatch   


 


BBK History Checked   














  12/01/18





  16:10


 


WBC 


 


RBC 


 


Hgb 


 


Hct 


 


MCV 


 


MCH 


 


MCHC 


 


RDW 


 


Plt Count 


 


MPV 


 


Gran % 


 


Lymph % (Auto) 


 


Mono % (Auto) 


 


Eos % (Auto) 


 


Baso % (Auto) 


 


Gran # 


 


Lymph # (Auto) 


 


Mono # (Auto) 


 


Eos # (Auto) 


 


Baso # (Auto) 


 


Hemoglobinopathy Red Blood Count 


 


Hemoglobinopathy Hct 


 


Hemoglobinopathy Hgb 


 


Hemoglobinopathy MCV 


 


Hemoglobinopathy MCH 


 


Hemoglobinopathy RDW 


 


Sodium 


 


Potassium 


 


Chloride 


 


Carbon Dioxide 


 


Anion Gap 


 


BUN 


 


Creatinine 


 


Est GFR ( Amer) 


 


Est GFR (Non-Af Amer) 


 


POC Glucose (mg/dL)  200 H


 


Random Glucose 


 


Calcium 


 


Total Bilirubin 


 


AST 


 


ALT 


 


Alkaline Phosphatase 


 


Lactate Dehydrogenase 


 


Total Creatine Kinase 


 


CK-MB (CK-2) 


 


CK-MB (CK-2) % 


 


Troponin I 


 


Total Protein 


 


Albumin 


 


Globulin 


 


Albumin/Globulin Ratio 


 


SS-A Antibody 


 


SS-B Ab Interp 


 


SS-B Antibody 


 


Sm (Whiting) Antibody 


 


Anti-Smith Interpret 


 


Double Strand DNA Ab 


 


Blood Type 


 


Antibody Screen 


 


Crossmatch 


 


BBK History Checked 














Assessment & Plan





- Assessment and Plan (Free Text)


Plan: 





Pt seen and examined.  This is a late entry. I have reviewed the note of the 

medical resident and agree with it. I have reviewed the meds and labs of the pt.

I have discussed the assessment and plan with the resident. Pt with JOHNIE vs CKD 

or both. 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 He does have an 

elevated CPK and this may be caused by rhabdo. He will be placed on IVF. I will 

get serology. He will need US of his Kidney. He has hyperkalemia.

## 2018-11-29 NOTE — NM
Date of service: 11/29/2018



COMPARISON:

Chest x-ray performed 11/29/18



TECHNIQUE:

30.0 mCi technetium 99-m  DTPA 



4.6 mCI technetium 99-m MAA administered intravenously.



FINDINGS:

Perfusion images do not show a segmental defect.  Activity extends 

expected margin of the lung periphery.  Ventilation images do not 

show any significant areas of ventilation defects.  



IMPRESSION:

Lowprobability ventilation perfusion scan for pulmonary embolism.

## 2018-11-29 NOTE — ED PDOC
Arrival/HPI





- General


Chief Complaint: Shortness Of Breath


Time Seen by Provider: 11/29/18 09:58


Historian: Patient





- History of Present Illness


Narrative History of Present Illness (Text): 





11/29/18 10:33


53 year old male, whose past medical history includes diabetes, hypertension, 

and hyperlipidemia, presents to the emergency department complaining of 

shortness of breath, for the past 3 days. Patient reports trouble breathing with

any type of exertion or ambulation for the past few days. Of note, he has 

experienced these symptoms intermittently, but for the past 3 days his symptoms 

have worsened. Patient notes secondary dry cough, and right knee/ankle pain s/p 

falling a few days ago. Patient denies fevers, chills, headache, dizziness, 

chest pain, abdominal pain, nausea, vomiting, diarrhea, back pain, neck pain, or

any other complaint.














Time/Duration: < week


Symptom Course: Unchanged


Activities at Onset: Light


Context: Home





Past Medical History





- Provider Review


Nursing Documentation Reviewed: Yes





- Infectious Disease


Hx of Infectious Diseases: None





- Cardiac


Hx Cardiac Disorders: Yes


Hx Hypertension: Yes





- Pulmonary


Hx Respiratory Disorders: No





- Neurological


Hx Neurological Disorder: No





- HEENT


Hx HEENT Disorder: No





- Renal


Hx Renal Disorder: No





- Endocrine/Metabolic


Hx Diabetes Mellitus Type 1: Yes





- Hematological/Oncological


Hx Blood Disorders: No





- Integumentary


Hx Dermatological Disorder: No





- Musculoskeletal/Rheumatological


Hx Musculoskeletal Disorders: No





- Gastrointestinal


Hx Gastrointestinal Disorders: No





- Genitourinary/Gynecological


Hx Genitourinary Disorders: No





- Psychiatric


Hx Psychophysiologic Disorder: No


Hx Substance Use: No





- Anesthesia


Hx Anesthesia: No





Family/Social History





- Physician Review


Nursing Documentation Reviewed: Yes


Family/Social History: No Known Family HX


Smoking Status: Never Smoked


Hx Alcohol Use: No


Hx Substance Use: No





Allergies/Home Meds


Allergies/Adverse Reactions: 


Allergies





No Known Allergies Allergy (Verified 06/29/18 18:35)


   








Home Medications: 


                                    Home Meds











 Medication  Instructions  Recorded  Confirmed


 


Aspirin [Adult Low Dose Aspirin EC] 81 mg PO DAILY 11/29/18 11/29/18


 


Atorvastatin [Lipitor] 80 mg PO DAILY 11/29/18 11/29/18


 


Labetalol [Trandate] 100 mg PO BID 11/29/18 11/29/18


 


Losartan [Cozaar] 100 mg PO DAILY 11/29/18 11/29/18


 


NIFEdipine ER [Nifedipine ER] 60 mg PO DAILY 11/29/18 11/29/18














Review of Systems





- Physician Review


All systems were reviewed & negative as marked: Yes





- Review of Systems


Constitutional: absent: Fevers


Respiratory: SOB, Cough


Cardiovascular: absent: Chest Pain


Gastrointestinal: absent: Abdominal Pain, Diarrhea, Nausea, Vomiting, Appetite 

Changes


Musculoskeletal: Other (pain to the right knee and ankle).  absent: Back Pain, 

Neck Pain


Neurological: absent: Headache, Dizziness





Physical Exam


Vital Signs Reviewed: Yes





Vital Signs











  Temp Pulse Resp BP Pulse Ox


 


 11/29/18 09:57  97.8 F  95 H  18  198/89 H  99











Temperature: Afebrile


Blood Pressure: Hypertensive


Pulse: Tachycardic


Respiratory Rate: Normal


Appearance: Positive for: Well-Appearing, Non-Toxic, Comfortable


Pain Distress: None


Mental Status: Positive for: Alert and Oriented X 3





- Systems Exam


Head: Present: Atraumatic, Normocephalic


Pupils: Present: PERRL


Extroacular Muscles: Present: EOMI


Conjunctiva: Present: Normal


Mouth: Present: Moist Mucous Membranes


Neck: Present: Normal Range of Motion


Respiratory/Chest: Present: Clear to Auscultation, Good Air Exchange.  No: 

Respiratory Distress, Accessory Muscle Use


Cardiovascular: Present: Regular Rate and Rhythm, Normal S1, S2.  No: Murmurs


Abdomen: No: Tenderness, Distention, Peritoneal Signs


Back: Present: Normal Inspection


Upper Extremity: Present: Normal Inspection.  No: Cyanosis, Edema


Lower Extremity: Present: Tenderness (to the right patella and ankle), Swelling 

(mild swelling to the right ankle)


Neurological: Present: GCS=15, CN II-XII Intact, Speech Normal


Skin: Present: Warm, Dry, Normal Color, Abrasion (abrasion to the right knee).  

No: Rashes


Psychiatric: Present: Alert, Oriented x 3, Normal Insight, Normal Concentration





Medical Decision Making


ED Course and Treatment: 





11/29/18 10:34


Impression:


53 year old male who presents to the emergency department complaining of 

shortness of breath, and right ankle and knee pain. 





Plan:


-- EKG


-- Labs


-- Chest X-ray


-- Altace


-- X-ray of the right ankle


-- Reassess and disposition





Prior Visits:


Notes and results from previous visits were reviewed.





Progress Notes:


EKG reviewed, shows: NSR at 92 bpm. No ST/T wave elevations. 





11/29/18 12:42


Chest X-ray reviewed, shows: 


IMPRESSION:


No active disease.





Ankle X-ray reviewed, shows: 


IMPRESSION:


Normal right ankle radiographs.





11/29/18 15:50


Case discussed with Hospitalist who is aware and agrees with the plan to admit 

the patient to Telemetry for renal evaluation. No dialysis at this time. 





- Lab Interpretations


I have reviewed the lab results: Yes





- RAD Interpretation


Radiology Orders: 











11/29/18 10:14


CHEST PORTABLE [RAD] Stat 














- EKG Interpretation


Interpreted by ED Physician: Yes


Type: 12 lead EKG





- Scribe Statement


The provider has reviewed the documentation as recorded by the Rubiaibe





Jenin Bragg





Provider Scribe Attestation:


All medical record entries made by the Scribe were at my direction and 

personally dictated by me. I have reviewed the chart and agree that the record 

accurately reflects my personal performance of the history, physical exam, 

medical decision making, and the department course for this patient. I have also

 personally directed, reviewed, and agree with the discharge instructions and 

disposition.








Disposition/Present on Arrival





- Present on Arrival


Any Indicators Present on Arrival: No


History of DVT/PE: No


History of Uncontrolled Diabetes: No


Urinary Catheter: No


History of Decub. Ulcer: No


History Surgical Site Infection Following: None





- Disposition


Have Diagnosis and Disposition been Completed?: Yes


Diagnosis: 


 Renal failure, Hyperkalemia, Elevated d-dimer





Disposition: HOSPITALIZED


Disposition Time: 12:00


Condition: GUARDED

## 2018-11-29 NOTE — CARD
--------------- APPROVED REPORT --------------





Date of service: 11/29/2018



EKG Measurement

Heart [id number]

NE [id number]

[id number]

[id number]

[id number]



<Conclusion>

Normal sinus rhythm

Normal ECG

## 2018-11-30 NOTE — CP.PCM.PN
<Juan Palbo Stephens - Last Filed: 11/30/18 15:34>





Subjective





- Date & Time of Evaluation


Date of Evaluation: 11/30/18


Time of Evaluation: 07:36





- Subjective


Subjective: 





INTERNAL MEDICINE PROGRESS NOTE FOR DR. RAFAEL Stephens PGY1





Pt seen and examined at bedside this am. No acute nursing events overnight. He 

has been urinating well. He still complains of some shortness of breath. His 12 

point ROS is otherwise negative





Objective





- Vital Signs/Intake and Output


Vital Signs (last 24 hours): 


                                        











Temp Pulse Resp BP Pulse Ox


 


 98.2 F   84   20   176/98 H  98 


 


 11/30/18 05:52  11/30/18 05:52  11/30/18 05:52  11/30/18 05:52  11/30/18 05:52








Intake and Output: 


                                        











 11/30/18 11/30/18





 06:59 18:59


 


Intake Total 180 


 


Output Total 0 


 


Balance 180 














- Medications


Medications: 


                               Current Medications





Calcitriol (Rocaltrol)  0.5 mcg PO DAILY [facility]


Calcium Acetate (Phoslo)  667 mg PO WM [facility]


Sodium Chloride (Sodium Chloride 0.9%)  1,000 mls @ 100 mls/hr IV .Q10H [facility]


   Last Admin: 11/29/18 16:26 Dose:  Not Given


Insulin Human Regular (Humulin R Med)  0 units SC [facility]; Protocol


   Last Admin: 11/30/18 01:14 Dose:  Not Given


Labetalol HCl (Trandate)  100 mg PO BID [facility]


   Last Admin: 11/29/18 17:38 Dose:  100 mg


Nifedipine (Procardia Xl)  60 mg PO DAILY [facility]


Sodium Bicarbonate (Sodium Bicarbonate Tab)  650 mg PO TID [facility]


   Last Admin: 11/29/18 17:38 Dose:  650 mg











- Labs


Labs: 


                                        





                                 11/30/18 06:00 





                                 11/29/18 10:40 





                                        











PT  13.1 SECONDS (9.4-12.5)  H  11/30/18  06:00    


 


INR  1.14   11/30/18  06:00    


 


APTT  29.5 Seconds (25.1-36.5)   11/29/18  16:34    














- Constitutional


Appears: Well





- Head Exam


Head Exam: NORMAL INSPECTION, NORMOCEPHALIC





- Eye Exam


Eye Exam: EOMI, Normal appearance





- ENT Exam


ENT Exam: Mucous Membranes Moist, Normal Exam





- Neck Exam


Neck Exam: Normal Inspection





- Respiratory Exam


Respiratory Exam: Clear to Ausculation Bilateral, NORMAL BREATHING PATTERN





- Cardiovascular Exam


Cardiovascular Exam: REGULAR RHYTHM, +S1, +S2





- GI/Abdominal Exam


GI & Abdominal Exam: Soft.  absent: Tenderness





- Extremities Exam


Extremities Exam: Normal Inspection.  absent: Calf Tenderness





- Back Exam


Back Exam: NORMAL INSPECTION





- Neurological Exam


Neurological Exam: Alert, Awake, Oriented x3





- Psychiatric Exam


Psychiatric exam: Normal Affect, Normal Mood





- Skin


Skin Exam: Dry, Intact, Warm





- Additional Findings


Additional findings: 





- Constitutional


Appears: Well, Non-toxic, No Acute Distress





- Head Exam


Head Exam: ATRAUMATIC, NORMAL INSPECTION





- Eye Exam


Eye Exam: EOMI, Normal appearance





- ENT Exam


ENT Exam: Mucous Membranes Moist, Normal Exam





- Neck Exam


Neck exam: Positive for: Normal Inspection





- Respiratory Exam


Respiratory Exam: Clear to Auscultation Bilateral, NORMAL BREATHING PATTERN





- Cardiovascular Exam


Cardiovascular Exam: REGULAR RHYTHM, +S1, +S2





- GI/Abdominal Exam


GI & Abdominal Exam: Distended, Normal Bowel Sounds, Soft





- Extremities Exam


Extremities exam: Positive for: normal inspection.  Negative for: calf 

tenderness





- Back Exam


Back exam: NORMAL INSPECTION





- Neurological Exam


Neurological exam: Alert, Oriented x3





- Psychiatric Exam


Psychiatric exam: Normal Affect, Normal Mood





- Skin


Skin Exam: Dry, Intact, Warm





Assessment and Plan





- Assessment and Plan (Free Text)


Assessment: 





54 y/o M with PMHx CKD, IDDM, HTN, HLD admitted for hyperkalemia, chf 

exacerbation, rhabdomyolysis in the setting of acute on chronic kidney failure


Plan: 





Acute on chronic kidney failure


Pt to undergo HD catheter placement today


PT to undergo HD saturday


start scheduled kayexalate


NS@ 50mls/hr


start sodium bicarbonate 650po tid


consult nephrology, appreciate recs


Urine studies: electrolytes, 24hr creatinine, total protein, upep,  


Hepatitis panel: lower lobe mass lesion on liver seen on renal ultrasound


B/l echogeneity seen on renal u/s


Vitamin D level, uric acid, 


I&Os





r/o CHF Exacerbation


s/p cardiac cath


repeat troponin in am


BNP elevated


f/u echo





Hyperkalemia


No EKG changes


calcium gluconate 1g


start kayexalate scheduled daily





Anemia of chronic kidney disease


consult GI for endoscopic workup


f/u abdominal u/s 


f/u iron studies


type & Screen 


transfuse before dialysis





Hx of HTN


continue home labetalol


continue home nifedipine





Hx of IDDM


sliding scale insulin





DVT ppx: SCD





Case seen, examined and discussed with attending physician, Dr. Lee





<Khadra Lee - Last Filed: 11/30/18 16:15>





Objective





- Vital Signs/Intake and Output


Vital Signs (last 24 hours): 


                                        











Temp Pulse Resp BP Pulse Ox


 


 98 F   90   12   185/90 H  96 


 


 11/30/18 12:03  11/30/18 13:32  11/30/18 12:03  11/30/18 13:32  11/30/18 12:03








Intake and Output: 


                                        











 11/30/18 11/30/18





 06:59 18:59


 


Intake Total 180 


 


Output Total 0 


 


Balance 180 














- Medications


Medications: 


                               Current Medications





Acetaminophen (Tylenol 325mg Tab)  650 mg PO Q4 PRN


   PRN Reason: Pain, Mild (1-3)


Calcitriol (Rocaltrol)  0.5 mcg PO DAILY [facility]


   Last Admin: 11/30/18 09:39 Dose:  0.5 mcg


Calcium Acetate (Phoslo)  667 mg PO WM [facility]


   Last Admin: 11/30/18 13:32 Dose:  667 mg


Clonidine HCl (Catapres)  0.1 mg PO BID PRN


   PRN Reason: Systolic Blood Pressure


   Last Admin: 11/30/18 13:32 Dose:  0.1 mg


Sodium Chloride (Sodium Chloride 0.9%)  1,000 mls @ 50 mls/hr IV .Q20H [facility]


Insulin Human Regular (Humulin R Med)  0 units SC ACHS [facility]; Protocol


   Last Admin: 11/30/18 13:00 Dose:  Not Given


Labetalol HCl (Trandate)  200 mg PO BID [facility]


Nifedipine (Procardia Xl)  60 mg PO DAILY [facility]


   Last Admin: 11/30/18 09:40 Dose:  60 mg


Ondansetron HCl (Zofran Inj)  4 mg IVP Q6H PRN


   PRN Reason: Nausea/Vomiting


Sodium Bicarbonate (Sodium Bicarbonate Tab)  650 mg PO TID [facility]


   Last Admin: 11/30/18 09:39 Dose:  650 mg


Sodium Polystyrene Sulfonate (Kayexalate Susp)  15 gm PO DAILY [facility]


   Last Admin: 11/30/18 13:32 Dose:  15 gm











- Labs


Labs: 


                                        





                                 11/30/18 06:00 





                                 11/30/18 06:00 





                                        











PT  13.1 SECONDS (9.4-12.5)  H  11/30/18  06:00    


 


INR  1.14   11/30/18  06:00    


 


APTT  29.5 Seconds (25.1-36.5)   11/29/18  16:34    














Attending/Attestation





- Attestation


I have personally seen and examined this patient.: Yes


I have fully participated in the care of the patient.: Yes


I have reviewed all pertinent clinical information, including history, physical 

exam and plan: Yes


Notes (Text): 





11/30/18 16:08





Attending note;


Patient seen and examined with the resident.


Patient is alert and awake.


Denies any chest pain.


Denies any fevers, chills, cough.


Denies any urinary, bowel symptoms.





Patient is a 53 year old male with PMHx of IDDM (diagnosed 30+ yrs ago), HTN (~3

yrs), HLD (~3yrs), CKD, chronic anemia presents to [facility] with complaints of 

shortness of breath that hes noticed for the past 3 days that worsened while 

laying down at night and walking.





1. Chronic kidney disease; GFR is 5.


Currently with creatinine of 12.9. Case discussed with nephrologist in detail.


Plan for cardiac catheter placement by IR today.


Plan for dialysis tomorrow.


Workup for chronic kidney disease initiated.


2. Hyperkalemia; Kayexalate given. Started on low potassium diet.


Hypercalcemia and hyperphosphatemia  due to renal insufficiency.  started on 

PhosLo and bicarbonate.


3. Shortness of breath;  Improved.clinically not in CHF. Echocardiogram ordered.

Cardiac enzyme negative. Cardiology evaluation appreciated.


4. severe anemia; mostly secondary to Chronic kidney disease.  No active 

bleeding noted. Hemoglobin is 7.0.


Anemia workup ordered. Transfuse as needed.


5. Renal ultrasound showed medical renal disease.


6. Rhabdomyolysis; secondary to recent fall. Improving.


7. Hypertension; continue Procardia and labetalol. ACE inhibitor on hold 

secondary to hyperkalemia.


8. liver mass; ultrasound ordered.  GI evaluation requested.


9. Elevated d-dimer. VQ scan is low probability for PE.





Case discussed with patient's sister in detail.





The diagnosis, treatment plan discussed with patient in detail.


Patient will be referred to [facility] clinic upon discharge.





11/30/18 16:14

## 2018-11-30 NOTE — CON
DATE:  11/30/2018



REASON FOR CONSULTATION:  Shortness of breath, renal failure.



HISTORY OF PRESENT ILLNESS:  This is a 53-year-old male, new to ,

admitted through the emergency room yesterday with shortness of breath,

severe anemia, and severe renal failure.  He has had several months of

deteriorating health.  Recently, he began experiencing shortness of breath

with minimal activities.  This became worse, and he came to the emergency

room yesterday.  He was found to have a creatinine of 12 and metabolic

abnormalities.  He underwent evaluation.  He is admitted to telemetry. 

This morning he feels better without chest pain or shortness of breath. 

There is no orthopnea, PND, syncope, presyncope, lightheadedness, dizziness

or vertigo.  There is no palpitation, fever, chills, rigors, sweats, cough,

sputum production, hemoptysis, abdominal pain, nausea, vomiting, diarrhea,

constipation, and melena.



PAST MEDICAL HISTORY:  His past medical history is notable for known kidney

disease, which has become progressively worse.  He was followed by a

physician in [place].  He has a history of diabetes, hypertension,

hyperlipidemia, and anemia.  He is a former smoker.  Among the

abnormalities, there is possible mass on his liver on the abdominal

ultrasound.



MEDICATIONS:  Medications at the time of admission include, aspirin,

nifedipine, losartan, atorvastatin, Humulin, and labetalol.



ALLERGIES:  THERE ARE NO MEDICATION ALLERGIES REPORTED.



SOCIAL HISTORY:  He recently moved to [place].  He is a .  He

no longer smokes.  He drinks alcohol occasionally.  He does not use drugs.



FAMILY HISTORY:  Positive for heart disease.



REVIEW OF SYSTEMS:  Ten-point review of systems is otherwise unremarkable

except as noted above.



PHYSICAL EXAMINATION:

GENERAL:  He is a well-developed male, lying in bed on telemetry, in no

acute distress.

VITAL SIGNS:  Notable for sinus rhythm, afebrile, blood pressure 176/98,

respirations 20, O2 sat 98% on room air and nasal cannula.

HEENT:  Exam reveals no neck vein distention, thyromegaly, or carotid

bruits.  Mucous membranes moist.  Conjunctivae pale.

NECK:  Supple.

LUNGS:  Lung fields clear.

CARDIOVASCULAR:  Examination of the heart revealed normal first and second

heart sounds.

ABDOMEN:  Soft.  Bowel sounds present.  No mass, organomegaly, tenderness,

rebound, or guarding.  No CVA tenderness.  No palpable abdominal aortic

aneurysm.

EXTREMITIES:  Extremity exam revealed no cyanosis, clubbing or edema.

NEUROLOGICAL:  Awake, alert and oriented.

PSYCHIATRIC:  Normal as to mood and affect.

SKIN:  Warm and dry.  No rash or cellulitis.



LABORATORY AND IMAGING STUDIES:  EKG demonstrates regular sinus rhythm,

LVH, nonspecific ST wave changes.  A portable chest x-ray revealed no

active disease.  A lung scan was low probability for PE.  An ankle x-ray

revealed a normal radiograph.  White count normal, hemoglobin 7, hematocrit

20.4, platelet count normal.  PT/INR unremarkable.  PTT unremarkable. 

D-dimer 707.  Electrolytes notable for potassium of 5.4, BUN 84, creatinine

12.9, calcium 5.6, uric acid 7.6.  Elevated liver enzymes and CK, troponin

is 0.03.  BNP 9500.



IMPRESSION:  Gregg Jerome is a 53-year-old man with progressive renal

failure with metabolic abnormalities, shortness of breath, and severe

anemia.  He is a former smoker.  His abdominal ultrasound suggests a liver

mass.



PLAN:  He is admitted to telemetry.  Plans are underway for hemodialysis to

begin.  He is being typed and crossmatched.  Transfusion is pending.  I

will order an echocardiogram and repeat his EKG.  Elevated CK is consistent

with rhabdomyolysis, possibly related to a recent fall.  We will monitor

I's and O's.  Check stool for occult blood.  He is getting insulin

coverage, PhosLo, nifedipine, Rocaltrol, sodium bicarb, and labetalol.  He

can be out of bed to a chair.  I would monitor I's and O's.  I will follow

along with you.  I will make additional recommendations based on his

clinical course at the appropriate time.  He should be considered for a

nuclear stress test.







__________________________________________

Carlos A Qureshi MD





DD:  11/30/2018 8:17:45

DT:  11/30/2018 8:21:50

Job # [id number]



LAURENT

## 2018-11-30 NOTE — CP.PCM.PN
<Jefry Carnes - Last Filed: 11/30/18 19:08>





Subjective





- Date & Time of Evaluation


Date of Evaluation: 11/30/18


Time of Evaluation: 08:00





- Subjective


Subjective: 





Nephro Progress Note for Dr. Peña Service





Patient seen and examined at bedside.  Reports feeling better today as compared 

to presentation, both in breathing and sensation of abdominal fullness, but labs

today indicate minimally improved creatinine (12.9 -> 12.7) and persistent 

electrolyte disturbances.  Will need HD, plan for IR to place HD cath today and 

will pursue HD tomorrow.





Objective





- Vital Signs/Intake and Output


Vital Signs (last 24 hours): 


                                        











Temp Pulse Resp BP Pulse Ox


 


 98 F   81   12   184/90 H  96 


 


 11/30/18 12:03  11/30/18 12:03  11/30/18 12:03  11/30/18 12:03  11/30/18 12:03








Intake and Output: 


                                        











 11/30/18 11/30/18





 06:59 18:59


 


Intake Total 180 


 


Output Total 0 


 


Balance 180 














- Medications


Medications: 


                               Current Medications





Acetaminophen (Tylenol 325mg Tab)  650 mg PO Q4 PRN


   PRN Reason: Pain, Mild (1-3)


Calcitriol (Rocaltrol)  0.5 mcg PO DAILY [facility]


   Last Admin: 11/30/18 09:39 Dose:  0.5 mcg


Calcium Acetate (Phoslo)  667 mg PO WM [facility]


   Last Admin: 11/30/18 09:39 Dose:  667 mg


Sodium Chloride (Sodium Chloride 0.9%)  1,000 mls @ 50 mls/hr IV .Q20H [facility]


Insulin Human Regular (Humulin R Med)  0 units SC ACHS [facility]; Protocol


   Last Admin: 11/30/18 08:30 Dose:  Not Given


Labetalol HCl (Trandate)  100 mg PO BID [facility]


   Last Admin: 11/30/18 09:40 Dose:  100 mg


Nifedipine (Procardia Xl)  60 mg PO DAILY [facility]


   Last Admin: 11/30/18 09:40 Dose:  60 mg


Ondansetron HCl (Zofran Inj)  4 mg IVP Q6H PRN


   PRN Reason: Nausea/Vomiting


Sodium Bicarbonate (Sodium Bicarbonate Tab)  650 mg PO TID [facility]


   Last Admin: 11/30/18 09:39 Dose:  650 mg


Sodium Polystyrene Sulfonate (Kayexalate Susp)  15 gm PO DAILY [facility]











- Labs


Labs: 


                                        





                                 11/30/18 06:00 





                                 11/30/18 06:00 





                                        











PT  13.1 SECONDS (9.4-12.5)  H  11/30/18  06:00    


 


INR  1.14   11/30/18  06:00    


 


APTT  29.5 Seconds (25.1-36.5)   11/29/18  16:34    














- Additional Findings


Additional findings: 





- Constitutional


Appears: No Acute Distress, Ill-appearing





- Head Exam


Head Exam: ATRAUMATIC, NORMAL INSPECTION





- Eye Exam


Eye Exam: EOMI, Normal appearance, No scleral icterus or conjunctival injection





- ENT Exam


ENT Exam: Mucous Membranes Moist, Normal Exam





- Neck Exam


Neck exam: Normal ROM, no Lymphadenopathy





- Respiratory Exam


Respiratory Exam: Mild bibasilar rales but otherwise clear to auscultation, 

NORMAL BREATHING PATTERN, No respiratory distress





- Cardiovascular Exam


Cardiovascular Exam: REGULAR RHYTHM, +S1, +S2





- GI/Abdominal Exam


GI & Abdominal Exam: Soft but still Distended, Normal Bowel Sounds, No 

tenderness to palpation





- Extremities Exam


Extremities exam: Positive for: Trace pitting edema in bilateral feet to ankles.

 Negative for: calf tenderness





- Back Exam


Back exam: NORMAL INSPECTION, negative for CVA tenderness bilaterally





- Neurological Exam


Neurological exam: Awake and alert, following all commands, moving all 

extremities spontaneously





- Psychiatric Exam


Psychiatric exam: Normal Affect, Normal Mood, Poor insight into condition





- Skin


Skin Exam: Dry, Intact, Warm








Assessment and Plan





- Assessment and Plan (Free Text)


Assessment: 





This is a 54 yo M with PMH of IDDM (diagnosed 30+ yrs ago), HTN (~3 yrs), HLD 

(~3yrs), CKD, and chronic anemia presents to [facility] with complaints of shortness of

breath that hes noticed for the past 3 days that worsened while laying down at 

night and walking.  Nephro was consulted for possible HD as patient was found to

have Cr of 12.9, extensive electrolyte abnormalities, and distended abdomen.  Cr

remains elevated at 12.7, persistent electrolyte abnormalities persist, so will 

require HD.


Plan: 





Acute renal failure overlying CKD


-Cr remains elevated, Cr 12.7, GFR 5


-etiology unclear, likely Rhabdo component


-Avoid nephrotoxic drugs, hold all ACEi/ARBs


-Agree with workup panel ordered by primary team


-Careful repletion of low electrolytes in setting of ARF to avoid toxic 

accumulation


-Will need HD


   IR consulted for HD catheter placement


   Will initiate HD tomorrow, consent obtained


-Renal US obtained, bilateral echogenic kidneys suggestive of medical renal 

disease, no hydronephrosis, incidentally noted right liver lobe lesions


   Rec to primary team further abdominal imaging to assess liver lesions





Anemia


-Hgb worsened to 7, primary team planning to transfuse pRBC


-remains hemodynamically stable, would recommend deferring transfusion to 

tomorrow, can given with HD and prevent fluid overload 2/2 transfusion





Reviewed and discussed with attending, Dr. Peña








<Floyd Peña - Last Filed: 12/01/18 17:24>





Objective





- Vital Signs/Intake and Output


Vital Signs (last 24 hours): 


                                        











Temp Pulse Resp BP Pulse Ox


 


 97.8 F   87   18   155/63 H  98 


 


 12/01/18 12:00  12/01/18 14:00  12/01/18 12:00  12/01/18 12:41  11/30/18 17:04








Intake and Output: 


                                        











 12/01/18 12/01/18





 06:59 18:59


 


Intake Total 1140 


 


Output Total 700 


 


Balance 440 














- Medications


Medications: 


                               Current Medications





Acetaminophen (Tylenol 325mg Tab)  650 mg PO Q4 PRN


   PRN Reason: Pain, Mild (1-3)


   Last Admin: 11/30/18 20:06 Dose:  650 mg


Calcitriol (Rocaltrol)  0.5 mcg PO DAILY [facility]


   Last Admin: 12/01/18 12:40 Dose:  0.5 mcg


Calcium Acetate (Phoslo)  667 mg PO WM [facility]


   Last Admin: 12/01/18 12:40 Dose:  667 mg


Clonidine HCl (Catapres)  0.1 mg PO BID PRN


   PRN Reason: Systolic Blood Pressure


   Last Admin: 11/30/18 13:32 Dose:  0.1 mg


Insulin Human Regular (Humulin R Med)  0 units SC [facility]; Protocol


   Last Admin: 12/01/18 12:42 Dose:  Not Given


Labetalol HCl (Trandate)  200 mg PO BID [facility]


   Last Admin: 12/01/18 12:41 Dose:  200 mg


Nifedipine (Procardia Xl)  60 mg PO DAILY [facility]


   Last Admin: 12/01/18 12:42 Dose:  60 mg


Ondansetron HCl (Zofran Inj)  4 mg IVP Q6H PRN


   PRN Reason: Nausea/Vomiting


Sodium Bicarbonate (Sodium Bicarbonate Tab)  650 mg PO TID [facility]


   Last Admin: 12/01/18 14:39 Dose:  Not Given


Sodium Polystyrene Sulfonate (Kayexalate Susp)  15 gm PO DAILY [facility]


   Last Admin: 12/01/18 10:00 Dose:  Not Given


Vitamin D (Vitamin D 400 Intl Units Tab)  1,200 intlu PO DAILY [facility]


   Last Admin: 12/01/18 12:42 Dose:  Not Given











- Labs


Labs: 


                                        





                                 12/01/18 07:00 





                                 12/01/18 07:00 





                                        











PT  13.1 SECONDS (9.4-12.5)  H  11/30/18  06:00    


 


INR  1.14   11/30/18  06:00    


 


APTT  29.5 Seconds (25.1-36.5)   11/29/18  16:34    














Assessment and Plan





- Assessment and Plan (Free Text)


Plan: 





Pt seen and examined. This is a late entry. I have reviewed the note of the 

medical resident and agree with it. I have reviewed the meds and labs of the pt.

I have discussed the assessment and plan with the resident. Pt with ESRD and 

will need HD. He will have a dialysis cath placed. Hb is low and may need 

transfusion. Renal US was reviewed. He is agreeable to HD. He understand the 

need for HD and the risk if not started. IVF were started and the Cr has not 

improved. He has CKD underlying as can be seen by the Renal US.  Serology is 

pending.

## 2018-11-30 NOTE — US
Date of service: 



11/30/2018



HISTORY:

mass seen on renal US



COMPARISON:

11/29/2018



TECHNIQUE:

Sonographic evaluation of the right upper quadrant of the abdomen.



FINDINGS:



LIVER:

Measures 9.7 x 15.0 cm in length. Patent portal vein. Portal venous 

flow: Hepatopetal. Unremarkable echogenicity of the liver parenchyma. 

 Confirmation of mass in the right hepatic lobe measures 6.3 x 6.5 x 

6.6 cm.  The mass has a nonspecific appearance, primarily 

hyperechoic. 



GALLBLADDER:

Unremarkable. No gallstones. 



COMMON BILE DUCT:

Measures  mm.  No stones. No dilatation.



PANCREAS:

Unremarkable as visualized. No mass. No ductal dilatation.



RIGHT KIDNEY:

Measures 5.7 x 9.3 cm in length. Increased echogenicity compatible 

with medical renal disease..  No calculus, mass, or hydronephrosis.



AORTA:

No aneurysmal dilatation.



IVC:

Unremarkable.



OTHER FINDINGS:

None .



IMPRESSION:

Confirmation of mass identified on recent renal ultrasound.



This represents a solitary solid mass.



CT/MRI may be beneficial for further clarification of the mass as 

well as improving sensitivity for other possible hepatic masses not 

seen on the current study

## 2018-11-30 NOTE — VASCULAR
PROCEDURE:  Ultrasound and fluoroscopic tunneled right IJ dialysis 

catheter.



CLINICAL HISTORY:  ESRD



PHYSICIAN(S):  Kojo Whiting M.D.



TECHNIQUE:

The relative risks and indications for the procedure were explained 

to the patient and informed written consent obtained. The patient was 

placed supine on the arteriography table and the right neck/chest was 

prepped and draped in the usual sterile fashion. 1% Xylocaine was 

used to anesthetize the skin and soft tissues at the puncture site. 

Conscious sedation and monitoring were provided throughout the 

procedure by a nurse.



Under direct ultrasound guidance, the rightinternal jugular vein was 

punctured with a micropuncture set. A 0.035 Glidewire was advanced 

into the IVC. Sequential dilatation was performed with subsequent 

placement of a 28cm Nextgen catheter with its tip in the right 

atrium. A retrograde tunnel below the right clavicle was performed. 

The catheter was trimmed and the hub attached. Both ports aspirate 

and inject easily. The catheter was secured and a dressing applied. 

The patient tolerated the procedure well. 



IMPRESSION:

1. Ultrasound and fluoroscopically placed right IJ tunneled dialysis 

catheter.

## 2018-11-30 NOTE — US
HISTORY:

Leg pain and swelling. Evaluate for DVT



PHYSICIAN(S):  Kojo Whiting MD.



TECHNIQUE:

Duplex sonography and color-flow Doppler with graded compression were 

used to evaluate the deep venous systems of both lower extremities. 



FINDINGS:

The visualized deep venous systems of both lower extremities are 

sonographically normal and compressible. Normal wave forms and 

augmentation are seen. There is no sonographic evidence for deep 

venous thrombosis in the visualized segments of both lower 

extremities.



IMPRESSION:

No sonographic evidence for deep venous thrombosis in the visualized 

segments of both lower extremities.

## 2018-12-01 NOTE — PN
DATE:  12/01/2018



SUBJECTIVE:  The patient is seen sitting in bed on telemetry.  A dialysis

catheter was placed yesterday and he is scheduled for dialysis today.  He

feels fair.  He denies any chest pain or dyspnea at rest.



CURRENT MEDICATIONS:  Include Catapres, insulin coverage, PhosLo, Procardia

XL, Rocaltrol, sodium bicarbonate and labetalol.



OBJECTIVE:

GENERAL:  He is a middle-aged male appears comfortable at the present time.

STEFANIA SIGNS:  His blood pressure is 150/86 with pulse of 80 and sinus

respirations 60.  He is afebrile.

HEENT:  No JVD.

CHEST:  Dialysis catheter is noted in the right chest:  Diminished breath

sounds are noted at the bases.

HEART:  PMI displaced laterally with a systolic murmur noted at the apex.

ABDOMEN:  Soft and nontender with normoactive bowel sounds.

EXTREMITIES:  No edema.



DIAGNOSTIC DATA:  CK is 2318 with a troponin of 0.03, potassium 4.7, BUN

and creatinine 81 and 12.3.  His echocardiogram revealed dilated left

atrium with normal LV size and systolic function, mild-to-moderate

concentric LVH and moderate-to-severe mitral regurgitation.



IMPRESSION:

1.  Acute renal failure, plans for dialysis in place.

2.  Dyspnea likely secondary to anemia and renal failure as well as

valvular heart disease.

3.  History of hypertension and diabetes.

4.  Mitral regurgitation, likely secondary to hypertensive heart disease.



RECOMMENDATIONS:  Current management should continue for now.  His symptoms

are likely improved further with dialysis.



Transfusion may be necessary if his hemoglobin does not improve. 

Monitoring of his mitral regurgitation in the future will be necessary.  An

eventual cardiac stress test would be appropriate given his risk factors of

hypertension and diabetes.  Continue smoking absence was advised and lipid

panel has been ordered as well.  We will continue to follow and make

further recommendations as appropriate.







__________________________________________

Wenceslao Saucedo MD





DD:  12/01/2018 8:01:10

DT:  12/01/2018 8:04:55

Job # [id number]

## 2018-12-01 NOTE — CP.PCM.PN
<Linda Last - Last Filed: 12/01/18 12:48>





Subjective





- Date & Time of Evaluation


Date of Evaluation: 12/01/18


Time of Evaluation: 12:53





- Subjective


Subjective: 





PGY1 Progress Note for Dr. Lee 


Patient seen and examined at bedside this morning. No acute nursing events 

overnight. Patient s/p dialysis cath placement 11/30. Patient is being consented

for dialysis and for PRBC transfusion. 12 Point ROS is otherwise unremarkable. 





Objective





- Vital Signs/Intake and Output


Vital Signs (last 24 hours): 


                                        











Temp Pulse Resp BP Pulse Ox


 


 97.8 F   95 H  18   155/63 H  98 


 


 12/01/18 12:00  12/01/18 12:41  12/01/18 12:00  12/01/18 12:41  11/30/18 17:04








Intake and Output: 


                                        











 12/01/18 12/01/18





 06:59 18:59


 


Intake Total 1140 


 


Output Total 700 


 


Balance 440 














- Medications


Medications: 


                               Current Medications





Acetaminophen (Tylenol 325mg Tab)  650 mg PO Q4 PRN


   PRN Reason: Pain, Mild (1-3)


   Last Admin: 11/30/18 20:06 Dose:  650 mg


Calcitriol (Rocaltrol)  0.5 mcg PO DAILY [facility]


   Last Admin: 12/01/18 12:40 Dose:  0.5 mcg


Calcium Acetate (Phoslo)  667 mg PO WM [facility]


   Last Admin: 12/01/18 12:40 Dose:  667 mg


Clonidine HCl (Catapres)  0.1 mg PO BID PRN


   PRN Reason: Systolic Blood Pressure


   Last Admin: 11/30/18 13:32 Dose:  0.1 mg


Insulin Human Regular (Humulin R Med)  0 units SC [facility]; Protocol


   Last Admin: 12/01/18 12:42 Dose:  Not Given


Labetalol HCl (Trandate)  200 mg PO BID [facility]


   Last Admin: 12/01/18 12:41 Dose:  200 mg


Nifedipine (Procardia Xl)  60 mg PO DAILY [facility]


   Last Admin: 12/01/18 12:42 Dose:  60 mg


Ondansetron HCl (Zofran Inj)  4 mg IVP Q6H PRN


   PRN Reason: Nausea/Vomiting


Sodium Bicarbonate (Sodium Bicarbonate Tab)  650 mg PO TID [facility]


   Last Admin: 12/01/18 12:42 Dose:  650 mg


Sodium Polystyrene Sulfonate (Kayexalate Susp)  15 gm PO DAILY [facility]


   Last Admin: 12/01/18 10:00 Dose:  Not Given


Vitamin D (Vitamin D 400 Intl Units Tab)  1,200 intlu PO DAILY [facility]


   Last Admin: 12/01/18 12:42 Dose:  Not Given











- Labs


Labs: 


                                        





                                 12/01/18 07:00 





                                 12/01/18 07:00 





                                        











PT  13.1 SECONDS (9.4-12.5)  H  11/30/18  06:00    


 


INR  1.14   11/30/18  06:00    


 


APTT  29.5 Seconds (25.1-36.5)   11/29/18  16:34    














- Additional Findings


Additional findings: 





- Constitutional


Appears: Well





- Head Exam


Head Exam: NORMAL INSPECTION, NORMOCEPHALIC





- Eye Exam


Eye Exam: EOMI, Normal appearance





- ENT Exam


ENT Exam: Mucous Membranes Moist, Normal Exam





- Neck Exam


Neck Exam: Normal Inspection





- Respiratory Exam


Respiratory Exam: Clear to Ausculation Bilateral, NORMAL BREATHING PATTERN





- Cardiovascular Exam


Cardiovascular Exam: REGULAR RHYTHM, +S1, +S2





- GI/Abdominal Exam


GI & Abdominal Exam: Soft.  absent: Tenderness





- Extremities Exam


Extremities Exam: Normal Inspection.  absent: Calf Tenderness





- Back Exam


Back Exam: NORMAL INSPECTION





- Neurological Exam


Neurological Exam: Alert, Awake, Oriented x3





- Psychiatric Exam


Psychiatric exam: Normal Affect, Normal Mood





- Skin


Skin Exam: Dry, Intact, Warm





Assessment and Plan





- Assessment and Plan (Free Text)


Assessment: 


54 y/o M with PMH CKD, IDDM, HTN, HLD admitted for hyperkalemia, CHF 

exacerbation, rhabdomyolysis in the setting of acute on chronic kidney failure


 





Acute on chronic kidney failure


- Patient s/p HD catheter placement (11/30) 


- PT currently in dialysis 


- Continue scheduled kayexalate


- Continue NS@ 50mls/hr


- Continue Sodium bicarbonate 650po tid


- Consult nephrology, appreciate recs


- Urine studies: electrolytes, 24hr creatinine, total protein, upep,  


- Hepatitis panel: lower lobe mass lesion on liver seen on renal ultrasound


- B/l echogeneity seen on renal u/s


- Vitamin D level, uric acid, 


- I&Os





Rule-out CHF Exacerbation


- Status-post cardiac cath


- repeat troponin in am


- BNP elevated


- f/u echo





Hyperkalemia, resolved 


- No EKG changes


- Given: Calcium gluconate 1g


- Continue kayexalate scheduled daily





Anemia of chronic kidney disease


- Consult GI for endoscopic workup


- Abdominal U/S: confirmation of mass identified on recent renal ultrasound. 

Please see report for details.  


- Iron studies within normal limits


- Type & Screen 


- Transfuse before dialysis





Hx of HTN


- Continue home labetalol


- Continue home nifedipine





Hx of IDDM


- Sliding scale insulin


- Hypoglycemic protocol


- Accu-checks ACHS 





DVT ppx: SCD





Patient seen and case discussed in detail with Dr. Rosa Last PGY1 





<Khadra Lee - Last Filed: 12/01/18 15:33>





Objective





- Vital Signs/Intake and Output


Vital Signs (last 24 hours): 


                                        











Temp Pulse Resp BP Pulse Ox


 


 97.8 F   95 H  18   155/63 H  98 


 


 12/01/18 12:00  12/01/18 12:41  12/01/18 12:00  12/01/18 12:41  11/30/18 17:04








Intake and Output: 


                                        











 12/01/18 12/01/18





 06:59 18:59


 


Intake Total 1140 


 


Output Total 700 


 


Balance 440 














- Medications


Medications: 


                               Current Medications





Acetaminophen (Tylenol 325mg Tab)  650 mg PO Q4 PRN


   PRN Reason: Pain, Mild (1-3)


   Last Admin: 11/30/18 20:06 Dose:  650 mg


Calcitriol (Rocaltrol)  0.5 mcg PO DAILY [facility]


   Last Admin: 12/01/18 12:40 Dose:  0.5 mcg


Calcium Acetate (Phoslo)  667 mg PO WM [facility]


   Last Admin: 12/01/18 12:40 Dose:  667 mg


Clonidine HCl (Catapres)  0.1 mg PO BID PRN


   PRN Reason: Systolic Blood Pressure


   Last Admin: 11/30/18 13:32 Dose:  0.1 mg


Insulin Human Regular (Humulin R Med)  0 units SC ACHS [facility]; Protocol


   Last Admin: 12/01/18 12:42 Dose:  Not Given


Labetalol HCl (Trandate)  200 mg PO BID [facility]


   Last Admin: 12/01/18 12:41 Dose:  200 mg


Nifedipine (Procardia Xl)  60 mg PO DAILY [facility]


   Last Admin: 12/01/18 12:42 Dose:  60 mg


Ondansetron HCl (Zofran Inj)  4 mg IVP Q6H PRN


   PRN Reason: Nausea/Vomiting


Sodium Bicarbonate (Sodium Bicarbonate Tab)  650 mg PO TID [facility]


   Last Admin: 12/01/18 14:39 Dose:  Not Given


Sodium Polystyrene Sulfonate (Kayexalate Susp)  15 gm PO DAILY [facility]


   Last Admin: 12/01/18 10:00 Dose:  Not Given


Vitamin D (Vitamin D 400 Intl Units Tab)  1,200 intlu PO DAILY [facility]


   Last Admin: 12/01/18 12:42 Dose:  Not Given











- Labs


Labs: 


                                        





                                 12/01/18 07:00 





                                 12/01/18 07:00 





                                        











PT  13.1 SECONDS (9.4-12.5)  H  11/30/18  06:00    


 


INR  1.14   11/30/18  06:00    


 


APTT  29.5 Seconds (25.1-36.5)   11/29/18  16:34    














Attending/Attestation





- Attestation


I have personally seen and examined this patient.: Yes


I have fully participated in the care of the patient.: Yes


I have reviewed all pertinent clinical information, including history, physical 

exam and plan: Yes


Notes (Text): 





12/01/18 15:22





Attending note;





Patient seen and examined with the resident in dialysis unit.


Patient is alert and awake.


Denies any chest pain.


Denies any fevers, chills, cough.


Denies any urinary, bowel symptoms.


Started on dialysis.


Consent obtained for blood transfusion.





Patient is a 53 year old male with PMHx of IDDM (diagnosed 30+ yrs ago), HTN (~3

yrs), HLD (~3yrs), CKD, chronic anemia presents to [facility] [place] with complaints of 

shortness of breath.





1. End-stage renal disease; patient with a history of chronic kidney disease 

mostly secondary to diabetes and hypertension.


Did not follow-up with PMD or nephrologist over year.


Currently with end-stage renal disease.


Started on hemodialysis today.


Status post right permacath placement by IR yesterday.


CKD work up in progress.


2. Hyperkalemia;  resolved. Started on low potassium diet.


Hypercalcemia and hyperphosphatemia  due to renal insufficiency.  started on 

PhosLo and bicarbonate.


3. Shortness of breath;  Improved.


 Echocardiogram showed ejection fraction of 52%, moderate LVH and moderate MR.


 Cardiology evaluation appreciated. Outpatient stress test recommended.


Patient also has mitral regurgitation needs close monitoring.


4. severe anemia; mostly secondary to Chronic kidney disease.  No active 

bleeding noted. Hemoglobin is 7.0.


2 units PRBC transfusion ordered. Consent obtained.


5. Renal ultrasound showed medical renal disease.


6. Rhabdomyolysis; CPK is improving .secondary to recent fall. 


7. Hypertension; continue Procardia and labetalol. ACE inhibitor on hold 

secondary to hyperkalemia.


8. liver mass; ultrasound  showed solitary solid mass. GI evaluation 

appreciated.


Plan for endoscopy when stable.


We'll follow up with IR for possible liver biopsy as outpatient.


9. Elevated d-dimer. VQ scan is low probability for PE.





The diagnosis, treatment plan discussed with patient in detail.


Patient will be referred to [facility] clinic upon discharge.

## 2018-12-01 NOTE — CP.PCM.PN
<Jefry Carnes - Last Filed: 12/01/18 14:12>





Subjective





- Date & Time of Evaluation


Date of Evaluation: 12/01/18


Time of Evaluation: 07:20





- Subjective


Subjective: 





Progress Note for Dr. Peña Service





Patient seen and examined at bedside.  Pending HD for acute renal failure with 

persistently elevated Cr and electrolyte derangements.  Will also receive 2 

units pRBCs with HD.





Objective





- Vital Signs/Intake and Output


Vital Signs (last 24 hours): 


                                        











Temp Pulse Resp BP Pulse Ox


 


 97.8 F   82   19   150/86   98 


 


 12/01/18 06:00  12/01/18 06:00  12/01/18 06:00  12/01/18 06:00  11/30/18 17:04








Intake and Output: 


                                        











 12/01/18 12/01/18





 06:59 18:59


 


Intake Total 1140 


 


Output Total 700 


 


Balance 440 














- Medications


Medications: 


                               Current Medications





Acetaminophen (Tylenol 325mg Tab)  650 mg PO Q4 PRN


   PRN Reason: Pain, Mild (1-3)


   Last Admin: 11/30/18 20:06 Dose:  650 mg


Calcitriol (Rocaltrol)  0.5 mcg PO DAILY [facility]


   Last Admin: 11/30/18 09:39 Dose:  0.5 mcg


Calcium Acetate (Phoslo)  667 mg PO WM [facility]


   Last Admin: 12/01/18 08:45 Dose:  Not Given


Clonidine HCl (Catapres)  0.1 mg PO BID PRN


   PRN Reason: Systolic Blood Pressure


   Last Admin: 11/30/18 13:32 Dose:  0.1 mg


Insulin Human Regular (Humulin R Med)  0 units SC [facility]; Protocol


   Last Admin: 12/01/18 08:44 Dose:  Not Given


Labetalol HCl (Trandate)  200 mg PO BID [facility]


   Last Admin: 11/30/18 17:38 Dose:  200 mg


Nifedipine (Procardia Xl)  60 mg PO DAILY [facility]


   Last Admin: 11/30/18 09:40 Dose:  60 mg


Ondansetron HCl (Zofran Inj)  4 mg IVP Q6H PRN


   PRN Reason: Nausea/Vomiting


Sodium Bicarbonate (Sodium Bicarbonate Tab)  650 mg PO TID [facility]


   Last Admin: 11/30/18 17:39 Dose:  650 mg


Sodium Polystyrene Sulfonate (Kayexalate Susp)  15 gm PO DAILY [facility]


   Last Admin: 11/30/18 13:32 Dose:  15 gm


Vitamin D (Vitamin D 400 Intl Units Tab)  1,200 intlu PO DAILY [facility]











- Labs


Labs: 


                                        





                                 12/01/18 07:00 





                                 12/01/18 07:00 





                                        











PT  13.1 SECONDS (9.4-12.5)  H  11/30/18  06:00    


 


INR  1.14   11/30/18  06:00    


 


APTT  29.5 Seconds (25.1-36.5)   11/29/18  16:34    














- Additional Findings


Additional findings: 





- Constitutional


Appears: No Acute Distress, Less Ill-appearing





- Head Exam


Head Exam: ATRAUMATIC, NORMAL INSPECTION





- Eye Exam


Eye Exam: EOMI, Normal appearance, No scleral icterus or conjunctival injection





- ENT Exam


ENT Exam: Mucous Membranes Moist, Normal Exam





- Neck Exam


Neck exam: Normal ROM, no Lymphadenopathy





- Respiratory Exam


Respiratory Exam: Mild bibasilar rales but otherwise clear to auscultation, 

NORMAL BREATHING PATTERN, No respiratory distress





- Cardiovascular Exam


Cardiovascular Exam: REGULAR RHYTHM, +S1, +S2





- GI/Abdominal Exam


GI & Abdominal Exam: Soft but still Distended, Normal Bowel Sounds, No 

tenderness to palpation





- Extremities Exam


Extremities exam: Positive for: Trace pitting edema in bilateral feet to ankles.

 Negative for: calf tenderness





- Back Exam


Back exam: NORMAL INSPECTION, negative for CVA tenderness bilaterally





- Neurological Exam


Neurological exam: Awake and alert, following all commands, moving all e

xtremities spontaneously





- Psychiatric Exam


Psychiatric exam: Mild anxiety regarding HD, otherwise normal mood and affect





- Skin


Skin Exam: Dry, Intact, Warm








Assessment and Plan





- Assessment and Plan (Free Text)


Assessment: 





This is a 54 yo M with PMH of IDDM (diagnosed 30+ yrs ago), HTN (~3 yrs), HLD 

(~3yrs), CKD, and chronic anemia presents to [facility] [place] with complaints of shortness of

breath that hes noticed for the past 3 days that worsened while laying down at 

night and walking.  Nephro was consulted for possible HD as patient was found to

have Cr of 12.9, extensive electrolyte abnormalities, and distended abdomen.  

Pending HD and transfusion of 2 units pRBCs during HD.


Plan: 





Acute renal failure overlying CKD


-Cr remains elevated with electrolyte derangements


-etiology unclear, likely Rhabdo component; CK improving


-Avoid nephrotoxic drugs, hold all ACEi/ARBs


-Agree with workup panel ordered by primary team


-Careful repletion of low electrolytes in setting of ARF to avoid toxic 

accumulation


-Pending HD today, f/u


-Renal US obtained, bilateral echogenic kidneys suggestive of medical renal 

disease, no hydronephrosis, incidentally noted right liver lobe lesions


   Rec to primary team further abdominal imaging to assess liver lesions





Anemia


-Hgb remains 7, will transfuse 2 units pRBCs with HD





Reviewed and discussed with attending, Dr. Peña





<Floyd Peña - Last Filed: 12/01/18 16:58>





Objective





- Vital Signs/Intake and Output


Vital Signs (last 24 hours): 


                                        











Temp Pulse Resp BP Pulse Ox


 


 97.8 F   87   18   155/63 H  98 


 


 12/01/18 12:00  12/01/18 14:00  12/01/18 12:00  12/01/18 12:41  11/30/18 17:04








Intake and Output: 


                                        











 12/01/18 12/01/18





 06:59 18:59


 


Intake Total 1140 


 


Output Total 700 


 


Balance 440 














- Medications


Medications: 


                               Current Medications





Acetaminophen (Tylenol 325mg Tab)  650 mg PO Q4 PRN


   PRN Reason: Pain, Mild (1-3)


   Last Admin: 11/30/18 20:06 Dose:  650 mg


Calcitriol (Rocaltrol)  0.5 mcg PO DAILY [facility]


   Last Admin: 12/01/18 12:40 Dose:  0.5 mcg


Calcium Acetate (Phoslo)  667 mg PO WM [facility]


   Last Admin: 12/01/18 12:40 Dose:  667 mg


Clonidine HCl (Catapres)  0.1 mg PO BID PRN


   PRN Reason: Systolic Blood Pressure


   Last Admin: 11/30/18 13:32 Dose:  0.1 mg


Insulin Human Regular (Humulin R Med)  0 units SC [facility]; Protocol


   Last Admin: 12/01/18 12:42 Dose:  Not Given


Labetalol HCl (Trandate)  200 mg PO BID [facility]


   Last Admin: 12/01/18 12:41 Dose:  200 mg


Nifedipine (Procardia Xl)  60 mg PO DAILY [facility]


   Last Admin: 12/01/18 12:42 Dose:  60 mg


Ondansetron HCl (Zofran Inj)  4 mg IVP Q6H PRN


   PRN Reason: Nausea/Vomiting


Sodium Bicarbonate (Sodium Bicarbonate Tab)  650 mg PO TID [facility]


   Last Admin: 12/01/18 14:39 Dose:  Not Given


Sodium Polystyrene Sulfonate (Kayexalate Susp)  15 gm PO DAILY [facility]


   Last Admin: 12/01/18 10:00 Dose:  Not Given


Vitamin D (Vitamin D 400 Intl Units Tab)  1,200 intlu PO DAILY [facility]


   Last Admin: 12/01/18 12:42 Dose:  Not Given











- Labs


Labs: 


                                        





                                 12/01/18 07:00 





                                 12/01/18 07:00 





                                        











PT  13.1 SECONDS (9.4-12.5)  H  11/30/18  06:00    


 


INR  1.14   11/30/18  06:00    


 


APTT  29.5 Seconds (25.1-36.5)   11/29/18  16:34    














Assessment and Plan





- Assessment and Plan (Free Text)


Plan: 





Pt seen and examined. I have reviewed the note of the medical resident and agree

with it. I have discussed the assessment and plan with the resident.  I have 

reviewed the patient's labs and medications. Pt with JOHNIE on CKD. His Cr has not 

improved with IVF. He has underlying CKD. He will be started on HD today. I didd

see him on HD and he was comfortable. Blood flow and diasylate flow are normal. 

US shows CKD. Serology has been negative so far. He will need transfusion of 

PRBC.

## 2018-12-01 NOTE — CARD
--------------- APPROVED REPORT --------------





Date of service: 11/30/2018



EXAM: Two-dimensional and M-mode echocardiogram with Doppler and 

color Doppler.



INDICATION

ANEMIA, RENAL FAILURE



2D DIMENSIONS 

Left Atrium (2D)4.9   (1.6-4.0cm)IVSd1.4   (0.7-1.1cm)

LVDd4.7   (3.9-5.9cm)PWd1.5   (0.7-1.1cm)

LVDs3.5   (2.5-4.0cm)FS (%) 26.8   %

LVEF (%)52.4   (>50%)



M-Mode DIMENSIONS 

Aortic Root3.10   (2.2-3.7cm)Aortic Cusp Exc.1.80   (1.5-2.0cm)



Aortic Valve

AoV Peak Cpcyjike180.0cm/Mason Peak GR.6mmHg



Mitral Valve

MV E Dvfrpudi263.0cm/sMV A Vszeoxnc244.0cm/sE/A ratio1.2



TDI

Lateral E' Peak V6.24cm/sMedial E' Peak V6.82cm/sE/Lateral E'22.8

E/Medial E'20.8



Pulmonary Valve

PV Peak Jsmhyebh46.5cm/sPV Peak Grad.1mmHg



Tricuspid Valve

TR Peak Wwaxbkgp269xf/sRAP [id number] Peak Gr.42mmHg

[id number]



 LEFT VENTRICLE 

The left ventricle is normal size. There is mild to moderate 

concentric left ventricular hypertrophy. The left ventricular 

function is normal. The left ventricular ejection fraction is within 

the normal range. There is normal LV segmental wall motion. Tissue 

Doppler imaging reveals mild left ventricular diastolic dysfunction.



 RIGHT VENTRICLE 

The right ventricle is normal size. The right ventricular systolic 

function is normal.



 ATRIA 

The left atrium is moderately dilated. The right atrium size is 

normal. The interatrial septum is intact with no evidence for an 

atrial septal defect.



 AORTIC VALVE 

The aortic valve is normal in structure. There is trace aortic 

regurgitation. There is no aortic valvular stenosis. 



 MITRAL VALVE 

The mitral valve is mildly thickened. Mitral regurgitation is 

moderate to severe.



 TRICUSPID VALVE 

The tricuspid valve is normal in structure. There is mild tricuspid 

regurgitation.



 PULMONIC VALVE 

The pulmonary valve is normal in structure.



 GREAT VESSELS 

The aortic root is normal in size. The IVC is normal in size and 

collapses >50% with inspiration.



 PERICARDIAL EFFUSION 

There is no pleural effusion. There is no pericardial effusion.



<Conclusion>

Dilated LA.

Normal LV size and systolic function.

Mild to moderate concentric LVH.

Moderate to severe MR.

Mild TR.

## 2018-12-01 NOTE — CON
DATE OF CONSULTATION:  12/01/2018



HISTORY OF PRESENT ILLNESS:  I saw the patient this morning.  He is a

53-year-old black male with past medical history of chronic kidney disease,

diabetes, hypertension, and long-standing anemia.  The patient was admitted

with complaints of severe shortness of breath plus increasing abdominal

distension.  He apparently was aware of his worsening renal condition, but

has not previously undergone dialysis.  According to the notes, he

underwent a previous colonoscopy, but does not recall when.  I reviewed

this issue and offered an endoscopic evaluation to the patient at bedside,

which was denied.  He denied any melena, hematochezia, or hematemesis.  The

patient denied any abdominal pain this morning.



PHYSICAL EXAMINATION:

VITAL SIGNS:  Reviewed.

HEENT:  Significant for dry mouth.

LUNGS:  Decreased breath sounds and airflow bilaterally.

HEART:  Irregular rhythm.

ABDOMEN:  Soft, doughy, protuberant.  He had no tenderness in the

epigastric area or in any quadrant.



LABORATORY DATA:  I reviewed the patient's laboratory data with the nurse

on the floor, which as of this morning consists of an H and H of 7 and 20,

WBC is 7.8, platelet count 194,000.  Retic count is 2.37.  Chemistry was

reviewed, which reveals CPK of 2710. Presented with a LDH elevated at 1264.

Initial BNP of 9500.  His creatinine has been in the range of 12. 

Hepatitis evaluation negative.  FILEMON and serum are consistent with acute

phase reaction.



The patient's abdominal ultrasound is significant for a patent portal vein.

There is a mass in the right hepatic lobe, which is roughly 6 x 6 x 6 cm. 

Gallbladder is noncontributory as well as the bile duct.  Studies

consistent with a solitary solid mass in the right hepatic lobe.



ASSESSMENT AND PLAN:  This is a 53-year-old black male presented with

congestive heart failure; renal failure, currently on dialysis, this is the

first time; history of chronic anemia, which indicates is also present in

the patient's daughter.  He has a number of studies pending and will need

endoscopic evaluation at some point.  Considering the anemia, according to

the nurses, he will be transfused sometime today.  The patient is stable

enough, I feel, to handle an upper endoscopy on Monday.  The patient will

be followed up by house staff as well as Renal later on this morning. Will 
review issue of endoscopy with housestaff.

Scenario more suggestive of renal etiology of anemia.





__________________________________________

Johnny Olivera DO, PhD





DD:  12/01/2018 5:17:32

DT:  12/01/2018 11:39:00

Job # [id number]

MTDD

## 2018-12-02 NOTE — PN
DATE:  12/02/2018



SUBJECTIVE:  The patient is seen sitting in his chair on Telemetry.  He is

currently comfortable during dialysis yesterday and feels a lot better. 

His dyspnea is improved and he also received two units of packed cells with

dialysis.  His hemoglobin this morning is 8.1.



CURRENT MEDICATIONS:  Include Catapres, Kayexalate, PhosLo, Rocaltrol,

sodium bicarbonate, labetalol 200 mg b.i.d., and Zestril 10 mg daily.



OBJECTIVE:

GENERAL:  He is a middle-aged male, appears comfortable at the present

time.

STEFANIA SIGNS:  His blood pressure is 146/76 with pulse of 80 and respirations

are 14.  He is afebrile.

HEENT:  No JVD.

CHEST:  Bilateral scattered rhonchi.

HEART:  PMI displaced laterally with soft systolic murmur at the apex.

ABDOMEN:  Soft and nontender with normoactive bowel sounds.

EXTREMITIES:  No edema.



DIAGNOSTIC DATA:  Potassium 4.1, BUN and creatinine 54 and 9.3, glucose

127, white count 8, hemoglobin and hematocrit 8.1 and 23.4 with platelet

count 177,000.  A 24-hour urine culture have revealed 18 g of protein.



IMPRESSION:

1.  Acute on chronic renal insufficiency with evidence of nephrotic

syndrome.

2.  Recent dyspnea due to severe anemia and renal failure as well as volume

overload, chronically improved.

3.  Moderate mitral regurgitation.

4.  Hypertension and diabetes.



RECOMMENDATIONS:  Current management should be continued for now.  After

one to two weeks of dialysis, an echocardiogram can be obtained to reassess

his mitral regurgitation once his volume status has improved.  This may be

less severe after correction of his volume overload state.  Continue the

beta-blocker therapy for hypertensive heart disease as advised and eventual

outpatient stress test would be recommended given his cardiac risk factors.

We will continue to follow and make further recommendations as appropriate.







__________________________________________

Wenceslao Saucedo MD



DD:  12/02/2018 11:46:11

DT:  12/02/2018 14:11:06

Job # [id number]

## 2018-12-02 NOTE — CP.PCM.PN
<Jefry Carnes - Last Filed: 12/02/18 19:15>





Subjective





- Date & Time of Evaluation


Date of Evaluation: 12/02/18


Time of Evaluation: 07:30





- Subjective


Subjective: 





Progress Note for Dr. Peña Service





Patient seen and examined at bedside.  S/p HD for acute renal failure yesterday,

also transfused 1 unit pRBCs during HD for anemia with appropriate response.  

Patient reports feeling well today.  No acute complaints at time of exam.  No 

acute events reported overnight.





Objective





- Vital Signs/Intake and Output


Vital Signs (last 24 hours): 


                                        











Temp Pulse Resp BP Pulse Ox


 


 98.4 F   81   20   145/77   97 


 


 12/02/18 06:00  12/02/18 06:00  12/02/18 06:00  12/02/18 06:00  12/02/18 06:00








Intake and Output: 


                                        











 12/02/18 12/02/18





 06:59 18:59


 


Intake Total 640 


 


Balance 640 














- Medications


Medications: 


                               Current Medications





Acetaminophen (Tylenol 325mg Tab)  650 mg PO Q4 PRN


   PRN Reason: Pain, Mild (1-3)


   Last Admin: 11/30/18 20:06 Dose:  650 mg


Calcitriol (Rocaltrol)  0.5 mcg PO DAILY [facility]


   Last Admin: 12/01/18 12:40 Dose:  0.5 mcg


Calcium Acetate (Phoslo)  667 mg PO WM [facility]


   Last Admin: 12/01/18 17:25 Dose:  667 mg


Clonidine HCl (Catapres)  0.1 mg PO BID PRN


   PRN Reason: Systolic Blood Pressure


   Last Admin: 11/30/18 13:32 Dose:  0.1 mg


Insulin Human Regular (Humulin R Med)  0 units SC [facility]; Protocol


   Last Admin: 12/01/18 21:49 Dose:  Not Given


Labetalol HCl (Trandate)  200 mg PO BID [facility]


   Last Admin: 12/01/18 17:25 Dose:  200 mg


Nifedipine (Procardia Xl)  60 mg PO DAILY [facility]


   Last Admin: 12/01/18 12:42 Dose:  60 mg


Ondansetron HCl (Zofran Inj)  4 mg IVP Q6H PRN


   PRN Reason: Nausea/Vomiting


Sodium Bicarbonate (Sodium Bicarbonate Tab)  650 mg PO TID [facility]


   Last Admin: 12/01/18 17:25 Dose:  650 mg


Sodium Polystyrene Sulfonate (Kayexalate Susp)  15 gm PO DAILY [facility]


   Last Admin: 12/02/18 05:27 Dose:  15 gm


Vitamin D (Vitamin D 400 Intl Units Tab)  1,200 intlu PO DAILY [facility]


   Last Admin: 12/01/18 12:42 Dose:  Not Given











- Labs


Labs: 


                                        





                                 12/02/18 07:00 





                                 12/02/18 07:00 





                                        











PT  13.1 SECONDS (9.4-12.5)  H  11/30/18  06:00    


 


INR  1.14   11/30/18  06:00    


 


APTT  29.5 Seconds (25.1-36.5)   11/29/18  16:34    














- Additional Findings


Additional findings: 





- Constitutional


Appears: No Acute Distress, Less Ill-appearing





- Head Exam


Head Exam: ATRAUMATIC, NORMAL INSPECTION





- Eye Exam


Eye Exam: EOMI, Normal appearance, No scleral icterus or conjunctival injection





- ENT Exam


ENT Exam: Mucous Membranes Moist, Normal Exam





- Neck Exam


Neck exam: Normal ROM, no Lymphadenopathy





- Respiratory Exam


Respiratory Exam: Clear to auscultation bilaterally, NORMAL BREATHING PATTERN, 

No respiratory distress





- Cardiovascular Exam


Cardiovascular Exam: REGULAR RHYTHM, +S1, +S2





- GI/Abdominal Exam


GI & Abdominal Exam: Soft but still Distended, Normal Bowel Sounds, No 

tenderness to palpation





- Extremities Exam


Extremities exam: Positive for: +1 pitting edema in bilateral LE from feet to 

mid-shins.  Negative for: calf tenderness





- Back Exam


Back exam: negative for CVA tenderness bilaterally





- Neurological Exam


Neurological exam: Awake and alert, following all commands, moving all 

extremities spontaneously





- Psychiatric Exam


Psychiatric exam: Normal mood and affect





- Skin


Skin Exam: Dry, Intact, Warm





Assessment and Plan





- Assessment and Plan (Free Text)


Assessment: 





This is a 54 yo M with PMH of IDDM (diagnosed 30+ yrs ago), HTN (~3 yrs), HLD 

(~3yrs), CKD, and chronic anemia [place] [facility] [place] of shortness of

breath that hes noticed for the past 3 days that worsened while laying down at 

night and walking.  Nephro was consulted for possible HD as patient was found to

have Cr of 12.9, extensive electrolyte abnormalities, and distended abdomen.  

S/p HD and 1 unit pRBC transfusion with appropriate response.


Plan: 





Acute renal failure overlying CKD - s/p HD


-Cr improved but remains elevated


-etiology unclear, likely Rhabdo component; CK improving


-Due to elevated protein in urine, will start low dose lisinopril for reduction 

of proteinuria


-Careful repletion of low electrolytes in setting of ARF to avoid toxic 

accumulation


-Renal US obtained, bilateral echogenic kidneys suggestive of medical renal dise

ase, no hydronephrosis, incidentally noted right liver lobe lesions


-Has Tunnel cath placed by IR, Vascular Surgery consulted to eval patient for 

eventual AV fistula graft





Anemia


-Improved from 7 to 8.1 s/p 1 unit pRBCs, appropriate response, continue to 

monitor, no indication for further transfusion at this time





Reviewed and discussed with attending, Dr. Peña








<Floyd Peña S - Last Filed: 12/02/18 19:33>





Subjective





- Subjective


Subjective: 





Pt seen and examined.  I have reviewed the note of the medical resident and 

agree with it. I have reviewed the meds and labs of the pt. I have discussed the

assessment and plan with the resident. Pt feels much better. He has ESRD and is 

a new start for HD. He will need a dialysis spot to continue HD. I did inform 

case management on Friday. His Ca is improving. His next HD is on Monday.





Objective





- Vital Signs/Intake and Output


Vital Signs (last 24 hours): 


                                        











Temp Pulse Resp BP Pulse Ox


 


 98.5 F   84   20   124/65   100 


 


 12/02/18 17:51  12/02/18 18:52  12/02/18 17:51  12/02/18 18:52  12/02/18 17:51








Intake and Output: 


                                        











 12/02/18 12/03/18





 18:59 06:59


 


Intake Total 1260 


 


Output Total 1550 


 


Balance -290 














- Medications


Medications: 


                               Current Medications





Acetaminophen (Tylenol 325mg Tab)  650 mg PO Q4 PRN


   PRN Reason: Pain, Mild (1-3)


   Last Admin: 11/30/18 20:06 Dose:  650 mg


Calcitriol (Rocaltrol)  0.5 mcg PO DAILY [facility]


   Last Admin: 12/02/18 12:13 Dose:  0.5 mcg


Calcium Acetate (Phoslo)  667 mg PO WM [facility]


   Last Admin: 12/02/18 18:52 Dose:  667 mg


Clonidine HCl (Catapres)  0.1 mg PO BID PRN


   PRN Reason: Systolic Blood Pressure


   Last Admin: 11/30/18 13:32 Dose:  0.1 mg


Insulin Human Regular (Humulin R Med)  [facility]; Protocol


   Last Admin: 12/02/18 18:52 Dose:  1 units


Labetalol HCl (Trandate)  200 mg PO BID [facility]


   Last Admin: 12/02/18 18:52 Dose:  200 mg


Lisinopril (Zestril)  10 mg PO DAILY [facility]


   Last Admin: 12/02/18 12:13 Dose:  10 mg


Ondansetron HCl (Zofran Inj)  4 mg IVP Q6H PRN


   PRN Reason: Nausea/Vomiting


Sodium Bicarbonate (Sodium Bicarbonate Tab)  650 mg PO TID [facility]


   Last Admin: 12/02/18 18:52 Dose:  650 mg


Sodium Polystyrene Sulfonate (Kayexalate Susp)  15 gm PO DAILY [facility]


   Last Admin: 12/02/18 05:27 Dose:  15 gm


Vitamin D (Vitamin D 400 Intl Units Tab)  1,200 intlu PO DAILY [facility]


   Last Admin: 12/02/18 13:09 Dose:  Not Given











- Labs


Labs: 


                                        





                                 12/02/18 07:00 





                                 12/02/18 07:00 





                                        











PT  13.1 SECONDS (9.4-12.5)  H  11/30/18  06:00    


 


INR  1.14   11/30/18  06:00    


 


APTT  29.5 Seconds (25.1-36.5)   11/29/18  16:34

## 2018-12-02 NOTE — PN
DATE:  12/02/2018



SUBJECTIVE:  I saw Mr. Jerome this morning.  He is a 53-year-old black male

with increasing dyspnea on exertion and anemia.  The patient underwent

hemodialysis yesterday with good results.  The patient indicated decreasing

abdominal distention.  No abdominal pain.  Decreased shortness of breath. 

Also, his appetite is improved relative to the day of admission.  There has

not been any nausea or vomiting or hematemesis or active bleeding noted. 

Note that I reviewed this case with the house staff yesterday.



PHYSICAL EXAMINATION:

VITAL SIGNS:  I reviewed this patient's vital signs.

HEENT:  Significant for dry mouth.

LUNGS:  Decreased breath sounds, quarter way up bilaterally at the bases,

otherwise clear.

HEART:  Irregular rhythm.

ABDOMEN:  Softer.  No tenderness elicited in any quadrant.



LABORATORY DATA:  Pending from this morning.  An updated last H and H as of

yesterday morning was 7/21, apparently this is before transfusion and

dialysis.  He has significant number of labs pending.  This includes

c-ANCA, p-ANCA titers, etc.  Note that the last creatine kinase was 2318,

so his LDH was 994.  No followup BNP noted in chart.  His creatinine as of

yesterday was still in the range of 12.



ASSESSMENT AND PLAN:  This is a 53-year-old black male with shortness of

breath, dyspnea on exertion, and anemia.  Again, as indicated previously,

his last hemoglobin and hematocrit in [facility] is 7/20.6.  Repeat blood

work pending from this morning after transfusion.  I believe he will

receive at least more unit of blood.  Dialysis disposition is up to the

Renal consultants.  The patient did not know if he was going to dialysis

today.



I reviewed this patient's anemia with the house staff yesterday.  I think

based on his current clinical scenario and the fact that his retic count is

inappropriate well for the degree of anemia, I believe the etiology is most

likely anemia of chronic disease based on renal factors.  At this

particular time point, I am not sure if it will be productive to schedule

this patient for an endoscopy. 

Will discuss above with the house staff.







__________________________________________

Johnny Olivera DO, PhD





DD:  12/02/2018 5:45:30

DT:  12/02/2018 8:06:08

[facility] # [id number]

MTDD

## 2018-12-02 NOTE — CP.PCM.CON
<Emily Potter - Last Filed: 12/02/18 13:20>





History of Present Illness





- History of Present Illness


History of Present Illness: 





Vascular surgery consult note for Dr. Clarke


Consulted for: AV fistula planning





Pt is a 54 y/o, right hand dominant M with PMH of DM, HTN, CKD, HLD who prese

nted for SOB for 3 days, and was found to have severe elevations in BUN and 

Creatinine and electrolyte abnormalities. Concern for possible rhabdomyolysis 

picture based on laboratory studies, patient admits to history of recent fall 

from trailer. Permacath was inserted by IR 2 days ago and HD began. Vascular 

surgery consult for AVF planning. Patient states that he does not have any SOB 

at this time, denies any numbness or tingling or weakness in his upper 

extremities, no history of fractures or prolonged catheterization of either arm,

chest pain, fevers, chills, or any other problems. Patient family history 

positive for father MI in 50's and mother having CABG in 60's.





PMH DM, HTN, CKD, HLD


PSH: nose fracture repair


ALL: NKDA


Soc: 1PPD 10 years quit 20 years ago, 3 beers/weekend, Marijuana in college, no 

other drugs





Review of Systems





- Review of Systems


All systems: reviewed and no additional remarkable complaints except (as per 

HPI)





Past Patient History





- Infectious Disease


Hx of Infectious Diseases: None





- Past Medical History & Family History


Past Medical History?: Yes


Pertinent Family History: 





father MI, mother CABG





- Past Social History


Smoking Status: Former Smoker


Alcohol: Occasional


Drugs: Denies





- CARDIAC


Hx Cardiac Disorders: Yes


Hx Hypertension: Yes





- PULMONARY


Hx Respiratory Disorders: No





- NEUROLOGICAL


Hx Neurological Disorder: No





- HEENT


Hx HEENT Problems: No





- RENAL


Hx Chronic Kidney Disease: No





- ENDOCRINE/METABOLIC


Hx Diabetes Mellitus Type 1: Yes





- HEMATOLOGICAL/ONCOLOGICAL


Hx Blood Disorders: No





- INTEGUMENTARY


Hx Dermatological Problems: No





- MUSCULOSKELETAL/RHEUMATOLOGICAL


Hx Musculoskeletal Disorders: No





- GASTROINTESTINAL


Hx Gastrointestinal Disorders: No





- GENITOURINARY/GYNECOLOGICAL


Hx Genitourinary Disorders: No





- PSYCHIATRIC


Hx Psychophysiologic Disorder: No


Hx Substance Use: No





- SURGICAL HISTORY


Hx Surgeries: No





- ANESTHESIA


Hx Anesthesia: No





Meds


Allergies/Adverse Reactions: 


                                    Allergies











Allergy/AdvReac Type Severity Reaction Status Date / Time


 


No Known Allergies Allergy   Verified 06/29/18 18:35














- Medications


Medications: 


                               Current Medications





Acetaminophen (Tylenol 325mg Tab)  650 mg PO Q4 PRN


   PRN Reason: Pain, Mild (1-3)


   Last Admin: 11/30/18 20:06 Dose:  650 mg


Calcitriol (Rocaltrol)  0.5 mcg PO DAILY [facility]


   Last Admin: 12/01/18 12:40 Dose:  0.5 mcg


Calcium Acetate (Phoslo)  667 mg PO WM [facility]


   Last Admin: 12/02/18 09:05 Dose:  667 mg


Clonidine HCl (Catapres)  0.1 mg PO BID PRN


   PRN Reason: Systolic Blood Pressure


   Last Admin: 11/30/18 13:32 Dose:  0.1 mg


Insulin Human Regular (Humulin R Med)  0 units SC [facility]; Protocol


   Last Admin: 12/02/18 08:29 Dose:  Not Given


Labetalol HCl (Trandate)  200 mg PO BID [facility]


   Last Admin: 12/01/18 17:25 Dose:  200 mg


Lisinopril (Zestril)  10 mg PO DAILY [facility]


Ondansetron HCl (Zofran Inj)  4 mg IVP Q6H PRN


   PRN Reason: Nausea/Vomiting


Sodium Bicarbonate (Sodium Bicarbonate Tab)  650 mg PO TID [facility]


   Last Admin: 12/01/18 17:25 Dose:  650 mg


Sodium Polystyrene Sulfonate (Kayexalate Susp)  15 gm PO DAILY [facility]


   Last Admin: 12/02/18 05:27 Dose:  15 gm


Vitamin D (Vitamin D 400 Intl Units Tab)  1,200 intlu PO DAILY [facility]


   Last Admin: 12/01/18 12:42 Dose:  Not Given











Physical Exam





- Constitutional


Appears: Well, Non-toxic, No Acute Distress





- Head Exam


Head Exam: ATRAUMATIC, NORMOCEPHALIC





- Eye Exam


Eye Exam: Normal appearance.  absent: Conjunctival injection, Scleral icterus





- ENT Exam


ENT Exam: Mucous Membranes Moist, Normal Oropharynx





- Respiratory Exam


Respiratory Exam: NORMAL BREATHING PATTERN.  absent: Accessory Muscle Use, 

Respiratory Distress





- Cardiovascular Exam


Cardiovascular Exam: RRR





- GI/Abdominal Exam


GI & Abdominal Exam: Soft.  absent: Distended, Tenderness





- Extremities Exam


Extremities exam: Positive for: pedal pulses present.  Negative for: calf 

tenderness, pedal edema


Additional comments: 





BL UE muscle strength 5/5, normal color, warm, BL 2/3 palpable radial and ulnar 

pulses





- Neurological Exam


Neurological exam: Alert, Oriented x3





- Psychiatric Exam


Psychiatric exam: Normal Affect, Normal Mood





- Skin


Skin Exam: Dry, Normal Color, Warm





Results





- Vital Signs


Recent Vital Signs: 


                                Last Vital Signs











Temp  98.4 F   12/02/18 06:00


 


Pulse  81   12/02/18 06:00


 


Resp  20   12/02/18 06:00


 


BP  145/77   12/02/18 06:00


 


Pulse Ox  97   12/02/18 06:00














- Labs


Result Diagrams: 


                                 12/02/18 07:00





                                 12/02/18 07:00


Labs: 


                         Laboratory Results - last 24 hr











  11/29/18 11/30/18 11/30/18





  11:35 06:00 06:00


 


WBC   


 


RBC   


 


Hgb   


 


Hct   


 


MCV   


 


MCH   


 


MCHC   


 


RDW   


 


Plt Count   


 


MPV   


 


Gran %   


 


Lymph % (Auto)   


 


Mono % (Auto)   


 


Eos % (Auto)   


 


Baso % (Auto)   


 


Gran #   


 


Lymph # (Auto)   


 


Mono # (Auto)   


 


Eos # (Auto)   


 


Baso # (Auto)   


 


Hemoglobinopathy Red Blood Count   2.31 L 


 


Hemoglobinopathy Hct   21.3 L 


 


Hemoglobinopathy Hgb   7.0 L 


 


Hemoglobinopathy MCV   91.9 


 


Hemoglobinopathy MCH   30.1 


 


Hemoglobinopathy RDW   13.1 


 


Sodium   


 


Potassium   


 


Chloride   


 


Carbon Dioxide   


 


Anion Gap   


 


BUN   


 


Creatinine   


 


Est GFR ( Amer)   


 


Est GFR (Non-Af Amer)   


 


POC Glucose (mg/dL)   


 


Random Glucose   


 


Calcium   


 


Total Bilirubin   


 


AST   


 


ALT   


 


Alkaline Phosphatase   


 


Total Protein   


 


Albumin   


 


Globulin   


 


Albumin/Globulin Ratio   


 


Triglycerides   


 


Cholesterol   


 


LDL Cholesterol Direct   


 


HDL Cholesterol   


 


Urine Color   


 


Urine Appearance   


 


Urine pH   


 


Ur Specific Gravity   


 


Urine Protein   


 


Urine Glucose (UA)   


 


Urine Ketones   


 


Urine Blood   


 


Urine Nitrate   


 


Urine Bilirubin   


 


Urine Urobilinogen   


 


Ur Leukocyte Esterase   


 


Urine RBC   


 


Urine WBC   


 


Ur Epithelial Cells   


 


Urine Eosinophils   


 


Urine Collection Time   


 


Urine Total Volume   


 


Ur Creatinine 24 Hour   


 


Ur Protein 24 Hr Calc   


 


SS-A Antibody    <1.0


 


SS-B Ab Interp    Negative


 


SS-B Antibody    <1.0


 


Blood Type  B POSITIVE  


 


Antibody Screen  Negative  


 


Crossmatch  See Detail  


 


BBK History Checked  No verified bt  














  12/01/18 12/01/18 12/01/18





  11:51 16:10 20:30


 


WBC   


 


RBC   


 


Hgb   


 


Hct   


 


MCV   


 


MCH   


 


MCHC   


 


RDW   


 


Plt Count   


 


MPV   


 


Gran %   


 


Lymph % (Auto)   


 


Mono % (Auto)   


 


Eos % (Auto)   


 


Baso % (Auto)   


 


Gran #   


 


Lymph # (Auto)   


 


Mono # (Auto)   


 


Eos # (Auto)   


 


Baso # (Auto)   


 


Hemoglobinopathy Red Blood Count   


 


Hemoglobinopathy Hct   


 


Hemoglobinopathy Hgb   


 


Hemoglobinopathy MCV   


 


Hemoglobinopathy MCH   


 


Hemoglobinopathy RDW   


 


Sodium   


 


Potassium   


 


Chloride   


 


Carbon Dioxide   


 


Anion Gap   


 


BUN   


 


Creatinine   


 


Est GFR ( Amer)   


 


Est GFR (Non-Af Amer)   


 


POC Glucose (mg/dL)  139 H  200 H 


 


Random Glucose   


 


Calcium   


 


Total Bilirubin   


 


AST   


 


ALT   


 


Alkaline Phosphatase   


 


Total Protein   


 


Albumin   


 


Globulin   


 


Albumin/Globulin Ratio   


 


Triglycerides   


 


Cholesterol   


 


LDL Cholesterol Direct   


 


HDL Cholesterol   


 


Urine Color   


 


Urine Appearance   


 


Urine pH   


 


Ur Specific Gravity   


 


Urine Protein   


 


Urine Glucose (UA)   


 


Urine Ketones   


 


Urine Blood   


 


Urine Nitrate   


 


Urine Bilirubin   


 


Urine Urobilinogen   


 


Ur Leukocyte Esterase   


 


Urine RBC   


 


Urine WBC   


 


Ur Epithelial Cells   


 


Urine Eosinophils   


 


Urine Collection Time    24


 


Urine Total Volume    1700 H


 


Ur Creatinine 24 Hour    1071.0


 


Ur Protein 24 Hr Calc    53890 H


 


SS-A Antibody   


 


SS-B Ab Interp   


 


SS-B Antibody   


 


Blood Type   


 


Antibody Screen   


 


Crossmatch   


 


BBK History Checked   














  12/01/18 12/02/18 12/02/18





  21:48 07:00 07:00


 


WBC   8.0 


 


RBC   2.73 L 


 


Hgb   8.1 L 


 


Hct   23.4 L 


 


MCV   85.7  D 


 


MCH   29.7 


 


MCHC   34.6 


 


RDW   13.0 


 


Plt Count   177 


 


MPV   11.7 H 


 


Gran %   72.0 H 


 


Lymph % (Auto)   15.1 L 


 


Mono % (Auto)   9.8 H 


 


Eos % (Auto)   3.0 


 


Baso % (Auto)   0.1 


 


Gran #   5.76 


 


Lymph # (Auto)   1.2 


 


Mono # (Auto)   0.8 H 


 


Eos # (Auto)   0.2 


 


Baso # (Auto)   0.01 


 


Hemoglobinopathy Red Blood Count   


 


Hemoglobinopathy Hct   


 


Hemoglobinopathy Hgb   


 


Hemoglobinopathy MCV   


 


Hemoglobinopathy MCH   


 


Hemoglobinopathy RDW   


 


Sodium    136


 


Potassium    4.1


 


Chloride    100


 


Carbon Dioxide    27


 


Anion Gap    13


 


BUN    54 H


 


Creatinine    9.3 H* D


 


Est GFR ( Amer)    7


 


Est GFR (Non-Af Amer)    6


 


POC Glucose (mg/dL)  159 H  


 


Random Glucose    127 H


 


Calcium    6.4 L*


 


Total Bilirubin    0.6


 


AST    33


 


ALT    30


 


Alkaline Phosphatase    116


 


Total Protein    6.4


 


Albumin    3.1


 


Globulin    3.3


 


Albumin/Globulin Ratio    0.9 L


 


Triglycerides    87


 


Cholesterol    132


 


LDL Cholesterol Direct    67


 


HDL Cholesterol    37


 


Urine Color   


 


Urine Appearance   


 


Urine pH   


 


Ur Specific Gravity   


 


Urine Protein   


 


Urine Glucose (UA)   


 


Urine Ketones   


 


Urine Blood   


 


Urine Nitrate   


 


Urine Bilirubin   


 


Urine Urobilinogen   


 


Ur Leukocyte Esterase   


 


Urine RBC   


 


Urine WBC   


 


Ur Epithelial Cells   


 


Urine Eosinophils   


 


Urine Collection Time   


 


Urine Total Volume   


 


Ur Creatinine 24 Hour   


 


Ur Protein 24 Hr Calc   


 


SS-A Antibody   


 


SS-B Ab Interp   


 


SS-B Antibody   


 


Blood Type   


 


Antibody Screen   


 


Crossmatch   


 


BBK History Checked   














  12/02/18 12/02/18 12/02/18





  07:22 08:50 09:00


 


WBC   


 


RBC   


 


Hgb   


 


Hct   


 


MCV   


 


MCH   


 


MCHC   


 


RDW   


 


Plt Count   


 


MPV   


 


Gran %   


 


Lymph % (Auto)   


 


Mono % (Auto)   


 


Eos % (Auto)   


 


Baso % (Auto)   


 


Gran #   


 


Lymph # (Auto)   


 


Mono # (Auto)   


 


Eos # (Auto)   


 


Baso # (Auto)   


 


Hemoglobinopathy Red Blood Count   


 


Hemoglobinopathy Hct   


 


Hemoglobinopathy Hgb   


 


Hemoglobinopathy MCV   


 


Hemoglobinopathy MCH   


 


Hemoglobinopathy RDW   


 


Sodium   


 


Potassium   


 


Chloride   


 


Carbon Dioxide   


 


Anion Gap   


 


BUN   


 


Creatinine   


 


Est GFR ( Amer)   


 


Est GFR (Non-Af Amer)   


 


POC Glucose (mg/dL)  130 H  


 


Random Glucose   


 


Calcium   


 


Total Bilirubin   


 


AST   


 


ALT   


 


Alkaline Phosphatase   


 


Total Protein   


 


Albumin   


 


Globulin   


 


Albumin/Globulin Ratio   


 


Triglycerides   


 


Cholesterol   


 


LDL Cholesterol Direct   


 


HDL Cholesterol   


 


Urine Color    yellow


 


Urine Appearance    Clear


 


Urine pH    6.5


 


Ur Specific Gravity    1.015


 


Urine Protein    >=300 H


 


Urine Glucose (UA)    100 H


 


Urine Ketones    Negative


 


Urine Blood    Moderate H


 


Urine Nitrate    Negative


 


Urine Bilirubin    Negative


 


Urine Urobilinogen    0.2


 


Ur Leukocyte Esterase    Moderate H


 


Urine RBC    20 - 25


 


Urine WBC    25 - 30


 


Ur Epithelial Cells    3 - 4


 


Urine Eosinophils   Negative 


 


Urine Collection Time   


 


Urine Total Volume   


 


Ur Creatinine 24 Hour   


 


Ur Protein 24 Hr Calc   


 


SS-A Antibody   


 


SS-B Ab Interp   


 


SS-B Antibody   


 


Blood Type   


 


Antibody Screen   


 


Crossmatch   


 


BBK History Checked   














Assessment & Plan





- Assessment and Plan (Free Text)


Assessment: 





53M with CKD now need HD access


Plan: 





F/U vein mapping


Patient may follow up with Dr. Clarke as an outpatient for AV fistula 

planning


Patient should continue to follow up with cardiology for cardiac evaluation and 

optimization prior to any OR


Left UE limb alert


Medical management per primary





Discussed with Dr. Tricia Potter, PGY2





<Darin Clarke - Last Filed: 12/11/18 08:03>





Results





- Vital Signs


Recent Vital Signs: 


                                Last Vital Signs











Temp  98.3 F   12/05/18 14:00


 


Pulse  76   12/05/18 14:00


 


Resp  18   12/05/18 14:00


 


BP  147/76   12/05/18 14:00


 


Pulse Ox  100   12/05/18 14:00














- Labs


Result Diagrams: 


                                 12/05/18 07:00





                                 12/05/18 06:30





Assessment & Plan





- Assessment and Plan (Free Text)


Plan: 


Patient was seen, examined and evaluated by me. I agree with resident's 

assessment and plan.

## 2018-12-02 NOTE — CP.PCM.PN
<Linda Last - Last Filed: 12/02/18 11:45>





Subjective





- Date & Time of Evaluation


Date of Evaluation: 12/02/18


Time of Evaluation: 11:45





- Subjective


Subjective: 





PGY1 Progress Note for Dr. Lee 


Patient seen and examined at bedside this morning. No acute nursing events 

overnight. Patient s/p dialysis cath placement 11/30. Patient is status-post 

dialysis and for PRBC transfusion 12/1/2018, Patient tolerated well. 12 Point 

ROS is otherwise unremarkable. 





Objective





- Vital Signs/Intake and Output


Vital Signs (last 24 hours): 


                                        











Temp Pulse Resp BP Pulse Ox


 


 98.4 F   81   20   145/77   97 


 


 12/02/18 06:00  12/02/18 06:00  12/02/18 06:00  12/02/18 06:00  12/02/18 06:00








Intake and Output: 


                                        











 12/02/18 12/02/18





 06:59 18:59


 


Intake Total 640 


 


Balance 640 














- Medications


Medications: 


                               Current Medications





Acetaminophen (Tylenol 325mg Tab)  650 mg PO Q4 PRN


   PRN Reason: Pain, Mild (1-3)


   Last Admin: 11/30/18 20:06 Dose:  650 mg


Calcitriol (Rocaltrol)  0.5 mcg PO DAILY [facility]


   Last Admin: 12/01/18 12:40 Dose:  0.5 mcg


Calcium Acetate (Phoslo)  667 mg PO WM [facility]


   Last Admin: 12/02/18 09:05 Dose:  667 mg


Clonidine HCl (Catapres)  0.1 mg PO BID PRN


   PRN Reason: Systolic Blood Pressure


   Last Admin: 11/30/18 13:32 Dose:  0.1 mg


Insulin Human Regular (Humulin R Med)  0 units SC [facility]; Protocol


   Last Admin: 12/02/18 08:29 Dose:  Not Given


Labetalol HCl (Trandate)  200 mg PO BID [facility]


   Last Admin: 12/01/18 17:25 Dose:  200 mg


Lisinopril (Zestril)  10 mg PO DAILY [facility]


Ondansetron HCl (Zofran Inj)  4 mg IVP Q6H PRN


   PRN Reason: Nausea/Vomiting


Sodium Bicarbonate (Sodium Bicarbonate Tab)  650 mg PO TID [facility]


   Last Admin: 12/01/18 17:25 Dose:  650 mg


Sodium Polystyrene Sulfonate (Kayexalate Susp)  15 gm PO DAILY [facility]


   Last Admin: 12/02/18 05:27 Dose:  15 gm


Vitamin D (Vitamin D 400 Intl Units Tab)  1,200 intlu PO DAILY [facility]


   Last Admin: 12/01/18 12:42 Dose:  Not Given











- Labs


Labs: 


                                        





                                 12/02/18 07:00 





                                 12/02/18 07:00 





                                        











PT  13.1 SECONDS (9.4-12.5)  H  11/30/18  06:00    


 


INR  1.14   11/30/18  06:00    


 


APTT  29.5 Seconds (25.1-36.5)   11/29/18  16:34    














- Additional Findings


Additional findings: 





- Constitutional


Appears: Well





- Head Exam


Head Exam: NORMAL INSPECTION, NORMOCEPHALIC





- Eye Exam


Eye Exam: EOMI, Normal appearance





- ENT Exam


ENT Exam: Mucous Membranes Moist, Normal Exam





- Neck Exam


Neck Exam: Normal Inspection





- Respiratory Exam


Respiratory Exam: Clear to Ausculation Bilateral, NORMAL BREATHING PATTERN





- Cardiovascular Exam


Cardiovascular Exam: REGULAR RHYTHM, +S1, +S2





- GI/Abdominal Exam


GI & Abdominal Exam: Soft.  absent: Tenderness





- Extremities Exam


Extremities Exam: Normal Inspection.  absent: Calf Tenderness





- Back Exam


Back Exam: NORMAL INSPECTION





- Neurological Exam


Neurological Exam: Alert, Awake, Oriented x3





- Psychiatric Exam


Psychiatric exam: Normal Affect, Normal Mood





- Skin


Skin Exam: Dry, Intact, Warm





Assessment and Plan





- Assessment and Plan (Free Text)


Assessment: 





52 y/o M with PMH CKD, IDDM, HTN, HLD admitted for hyperkalemia, CHF 

exacerbation, rhabdomyolysis in the setting of acute on chronic kidney failure. 

Patient s/p HD catheter placement (11/30). Patient status-post dialysis and 

transfusion. Pending out-patient dialysis arrangement. 


 





Acute on chronic kidney failure


- Patient s/p HD catheter placement (11/30) 


- PT tolerated dialysis without issue


- Continue scheduled kayexalate


- Continue NS@ 50mls/hr


- Continue Sodium bicarbonate 650po tid


- Consult nephrology, appreciate recs


- Urine studies: electrolytes, 24hr creatinine, total protein, upep,  


- Hepatitis panel: lower lobe mass lesion on liver seen on renal ultrasound


- B/l echogeneity seen on renal u/s


- Vitamin D level, uric acid, 


- I&Os





Rule-out CHF Exacerbation


- Status-post cardiac cath


- BNP elevated


- f/u echo





Hyperkalemia, resolved 


- No EKG changes


- Given: Calcium gluconate 1g


- Continue kayexalate scheduled daily





Anemia of chronic kidney disease


- Consult GI for endoscopic workup


- Abdominal U/S: confirmation of mass identified on recent renal ultrasound. 

Please see report for details.  


- Iron studies within normal limits


- Type & Screen 


- On dialysis now, transfuse as needed





Hx of HTN


- Continue home labetalol


- Continue home nifedipine





Hx of IDDM


- Sliding scale insulin


- Hypoglycemic protocol


- Accu-checks ACHS 





DVT ppx: SCD





Patient seen and case discussed in detail with Dr. Rosa Last PGY1 





<Khadra Lee - Last Filed: 12/02/18 16:05>





Objective





- Vital Signs/Intake and Output


Vital Signs (last 24 hours): 


                                        











Temp Pulse Resp BP Pulse Ox


 


 98.1 F   77   20   154/78 H  97 


 


 12/02/18 12:00  12/02/18 14:00  12/02/18 12:00  12/02/18 12:13  12/02/18 06:00








Intake and Output: 


                                        











 12/02/18 12/02/18





 06:59 18:59


 


Intake Total 640 


 


Balance 640 














- Medications


Medications: 


                               Current Medications





Acetaminophen (Tylenol 325mg Tab)  650 mg PO Q4 PRN


   PRN Reason: Pain, Mild (1-3)


   Last Admin: 11/30/18 20:06 Dose:  650 mg


Calcitriol (Rocaltrol)  0.5 mcg PO DAILY [facility]


   Last Admin: 12/02/18 12:13 Dose:  0.5 mcg


Calcium Acetate (Phoslo)  667 mg PO WM [facility]


   Last Admin: 12/02/18 12:13 Dose:  667 mg


Clonidine HCl (Catapres)  0.1 mg PO BID PRN


   PRN Reason: Systolic Blood Pressure


   Last Admin: 11/30/18 13:32 Dose:  0.1 mg


Insulin Human Regular (Humulin R Med)  [facility]; Protocol


   Last Admin: 12/02/18 12:13 Dose:  1 units


Labetalol HCl (Trandate)  200 mg PO BID [facility]


   Last Admin: 12/02/18 12:12 Dose:  200 mg


Lisinopril (Zestril)  10 mg PO DAILY [facility]


   Last Admin: 12/02/18 12:13 Dose:  10 mg


Ondansetron HCl (Zofran Inj)  4 mg IVP Q6H PRN


   PRN Reason: Nausea/Vomiting


Sodium Bicarbonate (Sodium Bicarbonate Tab)  650 mg PO TID [facility]


   Last Admin: 12/02/18 15:47 Dose:  650 mg


Sodium Polystyrene Sulfonate (Kayexalate Susp)  15 gm PO DAILY [facility]


   Last Admin: 12/02/18 05:27 Dose:  15 gm


Vitamin D (Vitamin D 400 Intl Units Tab)  1,200 intlu PO DAILY [facility]


   Last Admin: 12/02/18 13:09 Dose:  Not Given











- Labs


Labs: 


                                        





                                 12/02/18 07:00 





                                 12/02/18 07:00 





                                        











PT  13.1 SECONDS (9.4-12.5)  H  11/30/18  06:00    


 


INR  1.14   11/30/18  06:00    


 


APTT  29.5 Seconds (25.1-36.5)   11/29/18  16:34    














Attending/Attestation





- Attestation


I have personally seen and examined this patient.: Yes


I have fully participated in the care of the patient.: Yes


I have reviewed all pertinent clinical information, including history, physical 

exam and plan: Yes


Notes (Text): 





12/02/18 16:02








Attending note;





Patient seen and examined with the resident.


Patient is alert and awake.


Denies any chest pain.


Denies any fevers, chills, cough.


Denies any urinary, bowel symptoms.


Had hemodialysis yesterday. Tolerated well.





Patient is a 53 year old male with PMHx of IDDM (diagnosed 30+ yrs ago), HTN (~3

yrs), HLD (~3yrs), CKD, chronic anemia presents to [facility] with complaints of 

shortness of breath.





1. End-stage renal disease; patient with a history of chronic kidney disease 

mostly secondary to diabetes and hypertension.


Status post dialysis yesterday via  right permacath placement .


Tolerated procedure well.


2. Hyperkalemia;  resolved. Started on low potassium diet.


Hypercalcemia and hyperphosphatemia  due to renal insufficiency.  started on 

PhosLo and bicarbonate.


3. Shortness of breath;  Improved.


 Echocardiogram showed ejection fraction of 52%, moderate LVH and moderate MR.


 Cardiology evaluation appreciated. Outpatient stress test recommended.


Patient also has mitral regurgitation needs close monitoring.


4. severe anemia; mostly secondary to Chronic kidney disease.  No active 

bleeding noted. Hemoglobin is 8.1 after 1 unit PRBC transfusion yesterday.


5. Renal ultrasound showed medical renal disease.


6. Rhabdomyolysis; CPK is improving .secondary to recent fall. 


7. Hypertension; continue Procardia and labetalol.


8. liver mass; ultrasound  showed solitary solid mass. GI evaluation appreciate

d. Patient endoscopy recommended.


We'll follow up with IR for possible liver biopsy as outpatient.


9. Elevated d-dimer. VQ scan is low probability for PE.





Pending outpatient hemodialysis unit arrangements.





 we will Discuss with  and  tomorrow.





The diagnosis, treatment plan discussed with patient in detail.


Patient will be referred to [facility] clinic upon discharge. The patient has out of 

state Medicaid.











12/02/18 16:03

## 2018-12-03 NOTE — CP.PCM.PN
<Jefry Carnes - Last Filed: 12/03/18 09:31>





Subjective





- Date & Time of Evaluation


Date of Evaluation: 12/03/18


Time of Evaluation: 06:50





- Subjective


Subjective: 





Progress Note for Dr. Peña Service





Patient seen and examined at bedside.  Pending another HD today, will transfuse 

another 2 units pRBCs given worsening Hgb today.  Refusing EGD recommended by 

primary team, wants to pursue it as outpatient.





Objective





- Vital Signs/Intake and Output


Vital Signs (last 24 hours): 


                                        











Temp Pulse Resp BP Pulse Ox


 


 98.1 F   87   20   147/70   98 


 


 12/03/18 05:49  12/03/18 05:49  12/03/18 05:49  12/03/18 05:49  12/03/18 05:49








Intake and Output: 


                                        











 12/03/18 12/03/18





 06:59 18:59


 


Intake Total 480 


 


Output Total 975 


 


Balance -495 














- Medications


Medications: 


                               Current Medications





Acetaminophen (Tylenol 325mg Tab)  650 mg PO Q4 PRN


   PRN Reason: Pain, Mild (1-3)


   Last Admin: 12/03/18 02:14 Dose:  650 mg


Calcitriol (Rocaltrol)  0.5 mcg PO DAILY [facility], [facility]


   Last Admin: 12/02/18 12:13 Dose:  0.5 mcg


Calcium Acetate (Phoslo)  667 mg PO WM [facility], [facility]


   Last Admin: 12/03/18 07:48 Dose:  667 mg


Clonidine HCl (Catapres)  0.1 mg PO BID PRN


   PRN Reason: Systolic Blood Pressure


   Last Admin: 11/30/18 13:32 Dose:  0.1 mg


Insulin Human Regular (Humulin R Med)  0 units SC [facility]; Protocol


   Last Admin: 12/03/18 07:47 Dose:  3 units


Labetalol HCl (Trandate)  200 mg PO BID [facility], [facility]


   Last Admin: 12/02/18 18:52 Dose:  200 mg


Lisinopril (Zestril)  10 mg PO DAILY [facility], [facility]


   Last Admin: 12/02/18 12:13 Dose:  10 mg


Ondansetron HCl (Zofran Inj)  4 mg IVP Q6H PRN


   PRN Reason: Nausea/Vomiting


Sodium Bicarbonate (Sodium Bicarbonate Tab)  650 mg PO TID [facility], [facility]


   Last Admin: 12/02/18 18:52 Dose:  650 mg


Sodium Polystyrene Sulfonate (Kayexalate Susp)  15 gm PO DAILY [facility], [facility]


   Last Admin: 12/02/18 05:27 Dose:  15 gm


Vitamin D (Vitamin D 400 Intl Units Tab)  1,200 intlu PO DAILY [facility], [facility]


   Last Admin: 12/02/18 13:09 Dose:  Not Given











- Labs


Labs: 


                                        





                                 12/03/18 06:15 





                                 12/03/18 06:15 





                                        











PT  13.1 SECONDS (9.4-12.5)  H  11/30/18  06:00    


 


INR  1.14   11/30/18  06:00    


 


APTT  29.5 Seconds (25.1-36.5)   11/29/18  16:34    














- Additional Findings


Additional findings: 





- Constitutional


Appears: No Acute Distress, Less Ill-appearing





- Head Exam


Head Exam: ATRAUMATIC, NORMAL INSPECTION





- Eye Exam


Eye Exam: EOMI, Normal appearance, No scleral icterus or conjunctival injection





- ENT Exam


ENT Exam: Mucous Membranes Moist, Normal Exam





- Neck Exam


Neck exam: Normal ROM, no Lymphadenopathy





- Respiratory Exam


Respiratory Exam: Clear to auscultation bilaterally, NORMAL BREATHING PATTERN, 

No respiratory distress





- Cardiovascular Exam


Cardiovascular Exam: REGULAR RHYTHM, +S1, +S2





- GI/Abdominal Exam


GI & Abdominal Exam: Soft but still Distended, Normal Bowel Sounds, No tend

erness to palpation





- Extremities Exam


Extremities exam: Positive for: +1 pitting edema in bilateral LE from feet to 

mid-shins.  Negative for: calf tenderness





- Back Exam


Back exam: negative for CVA tenderness bilaterally





- Neurological Exam


Neurological exam: Awake and alert, following all commands, moving all 

extremities spontaneously





- Psychiatric Exam


Psychiatric exam: Normal mood and affect





- Skin


Skin Exam: Dry, Intact, Warm





Assessment and Plan





- Assessment and Plan (Free Text)


Assessment: 





This is a 52 yo M with PMH of IDDM (diagnosed 30+ yrs ago), HTN (~3 yrs), HLD 

(~3yrs), CKD, and chronic anemia presents to [facility] [place] with complaints of shortness of

breath that hes noticed for the past 3 days that worsened while laying down at 

night and walking.  Nephro was consulted for possible HD as patient was found to

have Cr of 12.9, extensive electrolyte abnormalities, and distended abdomen.  

Pending another round of HD and 2 units pRBCs transfusion.


Plan: 





Acute renal failure overlying CKD - s/p HD


-Cr improved but remains elevated


-etiology unclear, likely Rhabdo component; CK improving


-Continue low-dose lisinopril for proteinuria


-Careful repletion of low electrolytes in setting of ARF to avoid toxic 

accumulation


-Renal US obtained, bilateral echogenic kidneys suggestive of medical renal 

disease, no hydronephrosis, incidentally noted right liver lobe lesions


-Has Tunnel cath placed by IR, Vascular Surgery consulted to eval patient for 

eventual AV fistula graft


-2nd round HD today, pending assignation to HD site as outpatient prior to 

discharge





Anemia


-Hgb 8.1 -> 7.5, pending 2 more units pRBCs with HD today





From Nephro standpoint, once he is established at a site for outpatient HD, he 

is ready for discharge.





Reviewed and discussed with attending, Dr. Peña





<Floyd Peña - Last Filed: 12/03/18 19:46>





Objective





- Vital Signs/Intake and Output


Vital Signs (last 24 hours): 


                                        











Temp Pulse Resp BP Pulse Ox


 


 99.3 F   87   20   176/83 H  98 


 


 12/03/18 17:26  12/03/18 17:26  12/03/18 17:26  12/03/18 17:26  12/03/18 05:49








Intake and Output: 


                                        











 12/03/18 12/04/18





 18:59 06:59


 


Intake Total 900 


 


Output Total 0 


 


Balance 900 














- Medications


Medications: 


                               Current Medications





Acetaminophen (Tylenol 325mg Tab)  650 mg PO Q4 PRN


   PRN Reason: Pain, Mild (1-3)


   Last Admin: 12/03/18 17:19 Dose:  650 mg


Calcitriol (Rocaltrol)  0.5 mcg PO DAILY [facility], [facility]


   Last Admin: 12/03/18 14:12 Dose:  0.5 mcg


Calcium Acetate (Phoslo)  667 mg PO WM [facility], [facility]


   Last Admin: 12/03/18 17:18 Dose:  667 mg


Clonidine HCl (Catapres)  0.1 mg PO BID PRN


   PRN Reason: Systolic Blood Pressure


   Last Admin: 11/30/18 13:32 Dose:  0.1 mg


Insulin Human Regular (Humulin R Med)  0 units SC [facility]; Protocol


   Last Admin: 12/03/18 17:18 Dose:  1 units


Labetalol HCl (Trandate)  200 mg PO BID [facility], [facility]


   Last Admin: 12/03/18 17:19 Dose:  200 mg


Lisinopril (Zestril)  10 mg PO DAILY [facility], [facility]


   Last Admin: 12/03/18 14:12 Dose:  10 mg


Ondansetron HCl (Zofran Inj)  4 mg IVP Q6H PRN


   PRN Reason: Nausea/Vomiting


Sodium Bicarbonate (Sodium Bicarbonate Tab)  650 mg PO TID [facility], [facility]


   Last Admin: 12/03/18 17:18 Dose:  650 mg


Sodium Polystyrene Sulfonate (Kayexalate Susp)  15 gm PO DAILY [facility], [facility]


   Last Admin: 12/03/18 14:20 Dose:  Not Given


Vitamin D (Vitamin D 400 Intl Units Tab)  1,200 intlu PO DAILY [facility], [facility]


   Last Admin: 12/03/18 14:20 Dose:  Not Given











- Labs


Labs: 


                                        





                                 12/03/18 06:15 





                                 12/03/18 06:15 





                                        











PT  13.1 SECONDS (9.4-12.5)  H  11/30/18  06:00    


 


INR  1.14   11/30/18  06:00    


 


APTT  29.5 Seconds (25.1-36.5)   11/29/18  16:34    














Assessment and Plan





- Assessment and Plan (Free Text)


Plan: 





Pt seen and examined. I reviewed the note of the medical resident and I agree 

with the note including the assessment and plan. I reviewed the medications and 

last labs. Pt with ESRD on HD. He was dialyzed today. I spoke to the HD nurse. 

He was given 2 U of PRBC on HD for his anemia. He was seen by surgery for AVF 

placement. He states he feels well. His Ca is improving. He is on Cacitriol and 

HD that should continue to improve his Ca. He is also on Calcium acetate for his

secondary hyperparathyroidism, which will also help his Ca. He is ambulating 

better. I spoke to him about transplant evaluation this morning.

## 2018-12-03 NOTE — CP.PCM.PN
<Maximilian Askew - Last Filed: 12/03/18 07:31>





Subjective





- Date & Time of Evaluation


Date of Evaluation: 12/03/18


Time of Evaluation: 07:31





- Subjective


Subjective: 





Surgery Progress note- Dr. Clarke





Patient seen and examined at bedside. No new complaints. Vein mapping ordered, 

remains pending. on LUE limb alert. + OOB and ambulation. denies 

nausea/vomiting/fevers/chills





Objective





- Vital Signs/Intake and Output


Vital Signs (last 24 hours): 


                                        











Temp Pulse Resp BP Pulse Ox


 


 98.1 F   87   20   147/70   98 


 


 12/03/18 05:49  12/03/18 05:49  12/03/18 05:49  12/03/18 05:49  12/03/18 05:49








Intake and Output: 


                                        











 12/03/18 12/03/18





 06:59 18:59


 


Intake Total 480 


 


Output Total 975 


 


Balance -495 














- Medications


Medications: 


                               Current Medications





Acetaminophen (Tylenol 325mg Tab)  650 mg PO Q4 PRN


   PRN Reason: Pain, Mild (1-3)


   Last Admin: 12/03/18 02:14 Dose:  650 mg


Calcitriol (Rocaltrol)  0.5 mcg PO DAILY [facility]


   Last Admin: 12/02/18 12:13 Dose:  0.5 mcg


Calcium Acetate (Phoslo)  667 mg PO WM [facility]


   Last Admin: 12/02/18 18:52 Dose:  667 mg


Clonidine HCl (Catapres)  0.1 mg PO BID PRN


   PRN Reason: Systolic Blood Pressure


   Last Admin: 11/30/18 13:32 Dose:  0.1 mg


Insulin Human Regular (Humulin R Med)  0 units SC [facility]; Protocol


   Last Admin: 12/02/18 22:22 Dose:  Not Given


Labetalol HCl (Trandate)  200 mg PO BID [facility]


   Last Admin: 12/02/18 18:52 Dose:  200 mg


Lisinopril (Zestril)  10 mg PO DAILY [facility]


   Last Admin: 12/02/18 12:13 Dose:  10 mg


Ondansetron HCl (Zofran Inj)  4 mg IVP Q6H PRN


   PRN Reason: Nausea/Vomiting


Sodium Bicarbonate (Sodium Bicarbonate Tab)  650 mg PO TID [facility]


   Last Admin: 12/02/18 18:52 Dose:  650 mg


Sodium Polystyrene Sulfonate (Kayexalate Susp)  15 gm PO DAILY [facility]


   Last Admin: 12/02/18 05:27 Dose:  15 gm


Vitamin D (Vitamin D 400 Intl Units Tab)  1,200 intlu PO DAILY [facility]


   Last Admin: 12/02/18 13:09 Dose:  Not Given











- Labs


Labs: 


                                        





                                 12/03/18 06:15 





                                 12/03/18 06:15 





                                        











PT  13.1 SECONDS (9.4-12.5)  H  11/30/18  06:00    


 


INR  1.14   11/30/18  06:00    


 


APTT  29.5 Seconds (25.1-36.5)   11/29/18  16:34    














- Constitutional


Appears: Non-toxic, No Acute Distress





- Head Exam


Head Exam: ATRAUMATIC





- Eye Exam


Eye Exam: EOMI.  absent: Scleral icterus





- ENT Exam


ENT Exam: Mucous Membranes Moist





- Cardiovascular Exam


Cardiovascular Exam: +S1, +S2.  absent: Bradycardia, Tachycardia





- GI/Abdominal Exam


GI & Abdominal Exam: Soft.  absent: Distended, Firm, Guarding, Rigid, Tenderness





- Extremities Exam


Additional comments: 





On left limb alert





- Neurological Exam


Neurological Exam: Alert, Awake, Oriented x3





- Psychiatric Exam


Psychiatric exam: Normal Affect





- Skin


Skin Exam: Intact, Warm





Assessment and Plan





- Assessment and Plan (Free Text)


Assessment: 





53M w/ ESRD; surgery consulted for AVF creation


Plan: 





- f/u vein mapping


- continue HD per nephro and medical team


- will plan for AVF creation, likely follow up as outpatient


- further recs per Dr. Tricia Askew PGY2





<Darin Clarke - Last Filed: 12/11/18 08:02>





Objective





- Vital Signs/Intake and Output


Vital Signs (last 24 hours): 


                                        











Temp Pulse Resp BP Pulse Ox


 


 98.3 F   76   18   147/76   100 


 


 12/05/18 14:00  12/05/18 14:00  12/05/18 14:00  12/05/18 14:00  12/05/18 14:00











- Labs


Labs: 


                                        





                                 12/05/18 07:00 





                                 12/05/18 06:30 





                                        











PT  13.1 SECONDS (9.4-12.5)  H  11/30/18  06:00    


 


INR  1.14   11/30/18  06:00    


 


APTT  29.5 Seconds (25.1-36.5)   11/29/18  16:34    














Assessment and Plan





- Assessment and Plan (Free Text)


Plan: 


Patient was seen, examined and evaluated by me. I agree with resident's 

assessment and plan.

## 2018-12-03 NOTE — CP.PCM.PN
Subjective





- Date & Time of Evaluation


Date of Evaluation: 12/03/18


Time of Evaluation: 15:00





- Subjective


Subjective: 





PGY1 Progress Note for Dr. Sanchez 


Patient seen and examined at bedside this morning. No acute nursing events 

overnight. Patient s/p dialysis cath placement 11/30. Patient is status-post 

dialysis and for PRBC transfusion today. Patient tolerated well. 12 Point ROS is

otherwise unremarkable. 





Objective





- Vital Signs/Intake and Output


Vital Signs (last 24 hours): 


                                        











Temp Pulse Resp BP Pulse Ox


 


 98.1 F   91 H  16   188/92 H  98 


 


 12/03/18 12:00  12/03/18 14:12  12/03/18 12:00  12/03/18 14:12  12/03/18 05:49








Intake and Output: 


                                        











 12/03/18 12/03/18





 06:59 18:59


 


Intake Total 480 


 


Output Total 975 


 


Balance -495 














- Medications


Medications: 


                               Current Medications





Acetaminophen (Tylenol 325mg Tab)  650 mg PO Q4 PRN


   PRN Reason: Pain, Mild (1-3)


   Last Admin: 12/03/18 02:14 Dose:  650 mg


Calcitriol (Rocaltrol)  0.5 mcg PO DAILY [facility]


   Last Admin: 12/03/18 14:12 Dose:  0.5 mcg


Calcium Acetate (Phoslo)  667 mg PO WM [facility]


   Last Admin: 12/03/18 14:01 Dose:  Not Given


Clonidine HCl (Catapres)  0.1 mg PO BID PRN


   PRN Reason: Systolic Blood Pressure


   Last Admin: 11/30/18 13:32 Dose:  0.1 mg


Insulin Human Regular (Humulin R Med)  0 units SC [facility]; Protocol


   Last Admin: 12/03/18 14:00 Dose:  Not Given


Labetalol HCl (Trandate)  200 mg PO BID [facility]


   Last Admin: 12/03/18 14:00 Dose:  Not Given


Lisinopril (Zestril)  10 mg PO DAILY [facility]


   Last Admin: 12/03/18 14:12 Dose:  10 mg


Ondansetron HCl (Zofran Inj)  4 mg IVP Q6H PRN


   PRN Reason: Nausea/Vomiting


Sodium Bicarbonate (Sodium Bicarbonate Tab)  650 mg PO TID [facility]


   Last Admin: 12/03/18 14:12 Dose:  650 mg


Sodium Polystyrene Sulfonate (Kayexalate Susp)  15 gm PO DAILY [facility]


   Last Admin: 12/03/18 14:20 Dose:  Not Given


Vitamin D (Vitamin D 400 Intl Units Tab)  1,200 intlu PO DAILY [facility]


   Last Admin: 12/03/18 14:20 Dose:  Not Given











- Labs


Labs: 


                                        





                                 12/03/18 06:15 





                                 12/03/18 06:15 





                                        











PT  13.1 SECONDS (9.4-12.5)  H  11/30/18  06:00    


 


INR  1.14   11/30/18  06:00    


 


APTT  29.5 Seconds (25.1-36.5)   11/29/18  16:34    














- Additional Findings


Additional findings: 





- Constitutional


Appears: Well





- Head Exam


Head Exam: NORMAL INSPECTION, NORMOCEPHALIC





- Eye Exam


Eye Exam: EOMI, Normal appearance





- ENT Exam


ENT Exam: Mucous Membranes Moist, Normal Exam





- Neck Exam


Neck Exam: Normal Inspection





- Respiratory Exam


Respiratory Exam: Clear to Ausculation Bilateral, NORMAL BREATHING PATTERN





- Cardiovascular Exam


Cardiovascular Exam: REGULAR RHYTHM, +S1, +S2





- GI/Abdominal Exam


GI & Abdominal Exam: Soft.  absent: Tenderness





- Extremities Exam


Extremities Exam: Normal Inspection.  absent: Calf Tenderness





- Back Exam


Back Exam: NORMAL INSPECTION





- Neurological Exam


Neurological Exam: Alert, Awake, Oriented x3





- Psychiatric Exam


Psychiatric exam: Normal Affect, Normal Mood





- Skin


Skin Exam: Dry, Intact, Warm





Assessment and Plan





- Assessment and Plan (Free Text)


Assessment: 





54 y/o M with PMH CKD, IDDM, HTN, HLD admitted for hyperkalemia, CHF 

exacerbation, rhabdomyolysis in the setting of acute on chronic kidney failure. 

Patient s/p HD catheter placement (11/30). Patient status-post dialysis x3 and 

transfusions. Pending out-patient dialysis arrangement. 


 





Acute on chronic kidney failure


- Patient s/p HD catheter placement (11/30) 


- PT tolerated dialysis without issue


- Continue scheduled kayexalate


- Continue NS@ 50mls/hr


- Continue Sodium bicarbonate 650po tid


- Consult nephrology, appreciate recs


- Urine studies: electrolytes, 24hr creatinine, total protein, upep,  


- Hepatitis panel: lower lobe mass lesion on liver seen on renal ultrasound


- B/l echogeneity seen on renal u/s


- Vitamin D level, uric acid, 


- I&Os





Rule-out CHF Exacerbation


- Status-post cardiac cath


- BNP elevated


- f/u echo





Hyperkalemia, resolved 


- No EKG changes


- Given: Calcium gluconate 1g


- Continue kayexalate scheduled daily





Anemia of chronic kidney disease


- Consult GI for endoscopic workup


- Abdominal U/S: confirmation of mass identified on recent renal ultrasound. 

Please see report for details.  


- CT of abdomen / Liver ordered


- Iron studies within normal limits


- Type & Screen 


- On dialysis, transfuse as needed





Hx of HTN


- Continue home labetalol


- Continue home nifedipine





Hx of IDDM


- Sliding scale insulin


- Hypoglycemic protocol


- Accu-checks ACHS 





DVT ppx: SCD





Patient seen and case discussed in detail with Dr. Rosa Last PGY1

## 2018-12-03 NOTE — US
Date of service: 



12/03/2018



PROCEDURE:  Bilateral upper extremity venous ultrasound



HISTORY:

AVF planning 



COMPARISON:





TECHNIQUE:





FINDINGS:

The basilic veins in the forearm are small but patent.  The basilic 

veins above the elbow are normal in size.



The cephalic veins in the forearm and upper arms are large and 

patent.  The right cephalic vein in the forearm is larger than the 

left cephalic vein in the forearm. 



No evidence of superficial thrombophlebitis is seen 







IMPRESSION:

## 2018-12-04 NOTE — CP.PCM.PN
Subjective





- Date & Time of Evaluation


Date of Evaluation: 12/04/18


Time of Evaluation: 15:09





- Subjective


Subjective: 





PGY1 Progress Note for Dr. Sanchez 


Patient seen and examined at bedside this morning. No acute nursing events 

overnight. Patient s/p dialysis cath placement 11/30. Patient is status-post 

dialysis and for PRBC transfusion yesterday and tolerated well. 12 Point ROS is 

otherwise unremarkable. 





Objective





- Vital Signs/Intake and Output


Vital Signs (last 24 hours): 


                                        











Temp Pulse Resp BP Pulse Ox


 


 98.6 F   76   18   149/71   98 


 


 12/04/18 11:49  12/04/18 11:49  12/04/18 11:49  12/04/18 11:49  12/04/18 06:00








Intake and Output: 


                                        











 12/04/18 12/04/18





 06:59 18:59


 


Intake Total 720 


 


Balance 720 














- Medications


Medications: 


                               Current Medications





Acetaminophen (Tylenol 325mg Tab)  650 mg PO Q4 PRN


   PRN Reason: Pain, Mild (1-3)


   Last Admin: 12/03/18 17:19 Dose:  650 mg


Amlodipine Besylate (Norvasc)  10 mg PO DAILY [facility]


   Last Admin: 12/04/18 09:27 Dose:  10 mg


Calcitriol (Rocaltrol)  0.5 mcg PO DAILY [facility]


   Last Admin: 12/04/18 09:28 Dose:  0.5 mcg


Calcium Acetate (Phoslo)  667 mg PO WM [facility]


   Last Admin: 12/04/18 12:44 Dose:  667 mg


Clonidine HCl (Catapres)  0.1 mg PO BID PRN


   PRN Reason: Systolic Blood Pressure


   Last Admin: 12/04/18 06:20 Dose:  0.1 mg


Heparin Sodium (Porcine) (Heparin)  2,000 units IVP [facility]; Protocol


   PRN Reason: DIALYSIS


Insulin Human Regular (Humulin R Med)  0 units SC [facility]; Protocol


   Last Admin: 12/04/18 12:30 Dose:  Not Given


Labetalol HCl (Trandate)  200 mg PO BID [facility]


   Last Admin: 12/04/18 09:27 Dose:  200 mg


Lisinopril (Zestril)  40 mg PO DAILY [facility]


   Last Admin: 12/04/18 09:27 Dose:  40 mg


Magnesium Oxide (Mag-Ox)  400 mg PO BID [facility]


   Stop: 12/06/18 10:01


   Last Admin: 12/04/18 09:27 Dose:  400 mg


Ondansetron HCl (Zofran Inj)  4 mg IVP Q6H PRN


   PRN Reason: Nausea/Vomiting


Vitamin D (Vitamin D 400 Intl Units Tab)  1,200 intlu PO DAILY [facility]


   Last Admin: 12/04/18 09:31 Dose:  Not Given











- Labs


Labs: 


                                        





                                 12/04/18 06:45 





                                 12/04/18 06:45 





                                        











PT  13.1 SECONDS (9.4-12.5)  H  11/30/18  06:00    


 


INR  1.14   11/30/18  06:00    


 


APTT  29.5 Seconds (25.1-36.5)   11/29/18  16:34    














- Additional Findings


Additional findings: 





- Constitutional


Appears: Non Toxic, In no acute distress





- Head Exam


Head Exam: NORMAL INSPECTION, NORMOCEPHALIC





- Eye Exam


Eye Exam: EOMI, Normal appearance





- ENT Exam


ENT Exam: Mucous Membranes Moist, Normal Exam





- Neck Exam


Neck Exam: Normal Inspection





- Respiratory Exam


Respiratory Exam: Clear to Ausculation Bilateral, NORMAL BREATHING PATTERN





- Cardiovascular Exam


Cardiovascular Exam: REGULAR RHYTHM, +S1, +S2





- GI/Abdominal Exam


GI & Abdominal Exam: Soft.  absent: Tenderness





- Extremities Exam


Extremities Exam: Normal Inspection.  absent: Calf Tenderness





- Back Exam


Back Exam: NORMAL INSPECTION





- Neurological Exam


Neurological Exam: Alert, Awake, Oriented x3





- Psychiatric Exam


Psychiatric exam: Normal Affect, Normal Mood





- Skin


Skin Exam: Dry, Intact, Warm





Assessment and Plan





- Assessment and Plan (Free Text)


Assessment: 





54 y/o M with PMH CKD, IDDM, HTN, HLD admitted for hyperkalemia, CHF 

exacerbation, rhabdomyolysis in the setting of acute on chronic kidney failure. 

Patient s/p HD catheter placement (11/30). Patient status-post dialysis x2 and 

transfusions. Pending out-patient dialysis arrangement. 


 





Acute on chronic kidney failure


- Patient s/p HD catheter placement (11/30) 


- PT tolerated dialysis without issue


- Continue scheduled kayexalate


- Continue NS@ 50mls/hr


- Continue Sodium bicarbonate 650po tid


- Consult nephrology, appreciate recs


- Urine studies: electrolytes, 24hr creatinine, total protein, upep,  


- Hepatitis panel: lower lobe mass lesion on liver seen on renal ultrasound


- B/l echogeneity seen on renal u/s


- Vitamin D level, uric acid, 


- I&Os





Rule-out CHF Exacerbation


- Status-post cardiac cath


- BNP elevated


- f/u echo





Hyperkalemia, resolved 


- No EKG changes


- Given: Calcium gluconate 1g


- Continue kayexalate scheduled daily





Anemia of chronic kidney disease


- Consult GI for endoscopic workup


- Abdominal U/S: confirmation of mass identified on recent renal ultrasound. 

Please see report for details.  


- CT of abdomen / Liver ordered


- Iron studies within normal limits


- Type & Screen 


- On dialysis, transfuse as needed





Hx of HTN


- Continue home labetalol


- Continue home nifedipine





Hx of IDDM


- Sliding scale insulin


- Hypoglycemic protocol


- Accu-checks ACHS 





DVT ppx: SCD





Patient seen and case discussed in detail with Dr. Daniel Last PGY1

## 2018-12-04 NOTE — CP.PCM.PN
<Jefry Carnes - Last Filed: 12/04/18 14:44>





Subjective





- Date & Time of Evaluation


Date of Evaluation: 12/04/18


Time of Evaluation: 07:00





- Subjective


Subjective: 





Progress Note for Dr. Peña Service





Patient seen and examined at bedside.  S/p 2nd round of HD with another 2 units 

pRBCs transfused, with appropriate Hgb response.  Feeling well today.  As per 

Case Management, has been established with a HD site as an outpatient, but must 

complete his 3rd course of HD prior to discharge.  As per primary team, likely 

d/c tomorrow.





Objective





- Vital Signs/Intake and Output


Vital Signs (last 24 hours): 


                                        











Temp Pulse Resp BP Pulse Ox


 


 99.6 F   75   18   171/83 H  98 


 


 12/04/18 06:00  12/04/18 06:20  12/04/18 06:00  12/04/18 06:20  12/04/18 06:00








Intake and Output: 


                                        











 12/03/18 12/04/18





 18:59 06:59


 


Intake Total 900 720


 


Output Total 0 


 


Balance 900 720














- Medications


Medications: 


                               Current Medications





Acetaminophen (Tylenol 325mg Tab)  650 mg PO Q4 PRN


   PRN Reason: Pain, Mild (1-3)


   Last Admin: 12/03/18 17:19 Dose:  650 mg


Calcitriol (Rocaltrol)  0.5 mcg PO DAILY [facility]


   Last Admin: 12/03/18 14:12 Dose:  0.5 mcg


Calcium Acetate (Phoslo)  667 mg PO WM [facility]


   Last Admin: 12/03/18 17:18 Dose:  667 mg


Clonidine HCl (Catapres)  0.1 mg PO BID PRN


   PRN Reason: Systolic Blood Pressure


   Last Admin: 12/04/18 06:20 Dose:  0.1 mg


Insulin Human Regular (Humulin R Med)  [facility]; Protocol


   Last Admin: 12/03/18 21:27 Dose:  Not Given


Labetalol HCl (Trandate)  200 mg PO BID [facility]


   Last Admin: 12/03/18 17:19 Dose:  200 mg


Lisinopril (Zestril)  10 mg PO DAILY [facility]


   Last Admin: 12/03/18 14:12 Dose:  10 mg


Ondansetron HCl (Zofran Inj)  4 mg IVP Q6H PRN


   PRN Reason: Nausea/Vomiting


Sodium Polystyrene Sulfonate (Kayexalate Susp)  15 gm PO DAILY [facility]


   Last Admin: 12/03/18 14:20 Dose:  Not Given


Vitamin D (Vitamin D 400 Intl Units Tab)  1,200 intlu PO DAILY [facility]


   Last Admin: 12/03/18 14:20 Dose:  Not Given











- Labs


Labs: 


                                        





                                 12/03/18 06:15 





                                 12/03/18 06:15 





                                        











PT  13.1 SECONDS (9.4-12.5)  H  11/30/18  06:00    


 


INR  1.14   11/30/18  06:00    


 


APTT  29.5 Seconds (25.1-36.5)   11/29/18  16:34    














- Additional Findings


Additional findings: 





- Constitutional


Appears: No Acute Distress, Resting comfortably in bed





- Head Exam


Head Exam: ATRAUMATIC, NORMAL INSPECTION





- Eye Exam


Eye Exam: EOMI, Normal appearance, No scleral icterus or conjunctival injection





- ENT Exam


ENT Exam: Mucous Membranes Moist, Normal Exam





- Neck Exam


Neck exam: Normal ROM, no Lymphadenopathy





- Respiratory Exam


Respiratory Exam: Clear to auscultation bilaterally, NORMAL BREATHING PATTERN, 

No respiratory distress





- Cardiovascular Exam


Cardiovascular Exam: REGULAR RHYTHM, +S1, +S2





- GI/Abdominal Exam


GI & Abdominal Exam: Soft, Minimally distended, Normal Bowel Sounds, No 

tenderness to palpation





- Extremities Exam


Extremities exam: Positive for: +1 pitting edema in bilateral LE from feet to 

mid-shins.  Negative for: calf tenderness





- Back Exam


Back exam: negative for CVA tenderness bilaterally





- Neurological Exam


Neurological exam: Awake and alert, following all commands, moving all 

extremities spontaneously





- Psychiatric Exam


Psychiatric exam: Normal mood and affect





- Skin


Skin Exam: Dry, Intact, Warm





Assessment and Plan





- Assessment and Plan (Free Text)


Assessment: 





This is a 54 yo M with PMH of IDDM (diagnosed 30+ yrs ago), HTN (~3 yrs), HLD 

(~3yrs), CKD, and chronic anemia presents to [facility] with complaints of shortness of

breath that hes noticed for the past 3 days that worsened while laying down at 

night and walking.  He was determined to have ESRD, and has begun HD.  S/p 2nd 

round of HD yesterday with 2 units pRBCs transfused, appropriate response in Hgb

post-transfusion.


Plan: 





ESRD on HD


-etiology uncertain, worsening of CKD vs Diabetic nephropathy vs Hypertensive 

kidney injury


-S/p 2nd round of HD, as per Case Management has been established with site for 

outpatient HD on Tuesday/Thursday/Saturday schedule


-Continue low-dose lisinopril for proteinuria


-Careful repletion of low electrolytes in setting of ARF to avoid toxic 

accumulation


   Magnesium repletion x2 days ordered


   Continue Phoslo, Calcitriol, Vit D


-Renal US obtained, bilateral echogenic kidneys suggestive of medical renal 

disease, no hydronephrosis, incidentally noted right liver lobe lesions


-Has Tunnel cath placed by IR, established with Vascular Surgery for eventual AV

fistula graft





Anemia


-Hgb 7.5 -> 9.8, appropriate response s/p 2 units pRBCs


-as per PMD, patient recommended to undergo EGD to rule out GI bleed, but 

patient refused


-Continue to monitor





HTN


-remains elevated on Labetalol and Lisinopril, recommend increasing Lisinopril 

to 40mg daily and starting Amlodipine 10mg daily


-continue to monitor





IDDM:


-Blood glucose range in last 24 hrs 115-225


-continue insulin sliding scale, could consider decreasing the sliding scale and

adding a basal dose of long acting insulin


-pending A1c





Reviewed and discussed with attending, Dr. Peña





<Floyd Peña S - Last Filed: 12/04/18 19:14>





Objective





- Vital Signs/Intake and Output


Vital Signs (last 24 hours): 


                                        











Temp Pulse Resp BP Pulse Ox


 


 98.7 F   74   18   134/70   98 


 


 12/04/18 18:00  12/04/18 18:00  12/04/18 18:00  12/04/18 18:00  12/04/18 06:00








Intake and Output: 


                                        











 12/04/18 12/05/18





 18:59 06:59


 


Intake Total 960 


 


Balance 960 














- Medications


Medications: 


                               Current Medications





Acetaminophen (Tylenol 325mg Tab)  650 mg PO Q4 PRN


   PRN Reason: Pain, Mild (1-3)


   Last Admin: 12/03/18 17:19 Dose:  650 mg


Amlodipine Besylate (Norvasc)  10 mg PO DAILY [facility]


   Last Admin: 12/04/18 09:27 Dose:  10 mg


Calcitriol (Rocaltrol)  0.5 mcg PO DAILY [facility]


   Last Admin: 12/04/18 09:28 Dose:  0.5 mcg


Calcium Acetate (Phoslo)  667 mg PO WM [facility]


   Last Admin: 12/04/18 17:11 Dose:  667 mg


Clonidine HCl (Catapres)  0.1 mg PO BID PRN


   PRN Reason: Systolic Blood Pressure


   Last Admin: 12/04/18 06:20 Dose:  0.1 mg


Heparin Sodium (Porcine) (Heparin)  2,000 units IVP MWF [facility]; Protocol


   PRN Reason: DIALYSIS


Insulin Human Regular (Humulin R Med)  0 units SC [facility]; Protocol


   Last Admin: 12/04/18 17:54 Dose:  3 units


Labetalol HCl (Trandate)  200 mg PO BID [facility]


   Last Admin: 12/04/18 17:11 Dose:  200 mg


Lisinopril (Zestril)  40 mg PO DAILY [facility]


   Last Admin: 12/04/18 09:27 Dose:  40 mg


Magnesium Oxide (Mag-Ox)  400 mg PO BID [facility]


   Stop: 12/06/18 10:01


   Last Admin: 12/04/18 17:11 Dose:  400 mg


Ondansetron HCl (Zofran Inj)  4 mg IVP Q6H PRN


   PRN Reason: Nausea/Vomiting


Vitamin D (Vitamin D 400 Intl Units Tab)  1,200 intlu PO DAILY [facility]


   Last Admin: 12/04/18 09:31 Dose:  Not Given











- Labs


Labs: 


                                        





                                 12/04/18 06:45 





                                 12/04/18 06:45 





                                        











PT  13.1 SECONDS (9.4-12.5)  H  11/30/18  06:00    


 


INR  1.14   11/30/18  06:00    


 


APTT  29.5 Seconds (25.1-36.5)   11/29/18  16:34    














Assessment and Plan





- Assessment and Plan (Free Text)


Plan: 





Pt seen and examined. I reviewed the note of the medical resident and I agree 

with the note including the assessment and plan. I reviewed the medications and 

last labs. Pt with ESRD on HD due to diabetic nephropathy. He is going to be 

dialyzed in the am. Low Mg will be replaced. He had a transfusion yesterday and 

was given 2 units of PRBC. BP is uncontrolled and I increased the Lisinopril to 

40 mg and added Norvasc. He is waiting for a HD spot.

## 2018-12-05 NOTE — CT
Date of service: 



12/05/2018



PROCEDURE:  CT Abdomen with and without intravenous contrast



HISTORY:

liver mass



COMPARISON:

11/30/2018 hepatic ultrasound



TECHNIQUE:

Axial images of the abdomen from lung bases to iliac crest with and 

without intravenous contrast enhancement. Coronal and sagittal 

reformats generated. Oral contrast also administered.



Intravenous contrast Dose: 150 cc Visipaque 320.



Radiation dose:



Total exam DLP = 1720.23 mGy-cm.



This CT exam was performed using one or more of the following dose 

reduction techniques: Automated exposure control, adjustment of the 

mA and/or kV according to patient size, and/or use of iterative 

reconstruction technique.



FINDINGS:



LOWER THORAX:

Trace right pleural effusion. 



LIVER:

Confirmation of mass and segment 6 of the liver.  The mass measures 

6.5 x 6.7 cm.  On the arterial phase there is peripheral enhancement. 

 On the delayed phase enhancement follows a pattern consistent with 

hepatic hemangioma.  No additional hepatic masses identified. 



Patent portal venous system is satisfactorily visualized without 

evidence portal vein thrombosis or the stigmata of portal 

hypertension. 



GALLBLADDER AND BILE DUCTS:

Unremarkable. 



PANCREAS:

Unremarkable. No gross lesion or ductal dilatation.



SPLEEN:

Unremarkable. 



ADRENALS:

Unremarkable. No mass. 



KIDNEYS AND URETERS:

Variable enhancement kidneys consistent with known medical 

renal/chronic kidney disease.  No focal renal abnormalities or 

evidence of calculus disease nor hydronephrosis appreciated. 



VASCULATURE:

Atherosclerotic calcification and mural plaque present.  Findings are 

seen throughout the aorta.  The aorta is non aneurysmal



BOWEL:

Unremarkable. No obstruction. No gross mural thickening. 



APPENDIX:

No abnormalities to suggest acute appendicitis. No right lower 

quadrant inflammatory processes identified. 



PERITONEUM:

Unremarkable. No free fluid. No free air. 



LYMPH NODES:

Unremarkable. No enlarged lymph nodes. 



BLADDER:

The urinary bladder is decompressed.  No gross abnormalities 

detected. 



REPRODUCTIVE:

Mildly enlarged prostate. 



BONES:

No acute fracture. 



OTHER FINDINGS:

None.



IMPRESSION:

Contrast-enhancing characteristics of solitary mass in the right 

hepatic lobe consistent with hemangioma.



No additional hepatic abnormalities.



Additional benign and/or incidental findings described above.

## 2018-12-05 NOTE — CP.PCM.PN
<Jefry Carnes - Last Filed: 12/05/18 13:18>





Subjective





- Date & Time of Evaluation


Date of Evaluation: 12/05/18


Time of Evaluation: 06:50





- Subjective


Subjective: 





Progress Note for Dr. Peña Service





Patient seen and examined at bedside.  Pending 3rd round HD today.  Established 

with HD site here at [facility] for T/Th/Sat schedule after discharge from [facility].  No 

acute events reported tonight, and patient has no acute complaints this morning.

 Pending likely d/c today as per primary team.





Objective





- Vital Signs/Intake and Output


Vital Signs (last 24 hours): 


                                        











Temp Pulse Resp BP Pulse Ox


 


 98.7 F   74   18   134/70   98 


 


 12/04/18 18:00  12/04/18 18:00  12/04/18 18:00  12/04/18 18:00  12/04/18 06:00








Intake and Output: 


                                        











 12/04/18 12/05/18





 18:59 06:59


 


Intake Total 960 180


 


Balance 960 180














- Medications


Medications: 


                               Current Medications





Acetaminophen (Tylenol 325mg Tab)  650 mg PO Q4 PRN


   PRN Reason: Pain, Mild (1-3)


   Last Admin: 12/03/18 17:19 Dose:  650 mg


Amlodipine Besylate (Norvasc)  10 mg PO DAILY [facility]


   Last Admin: 12/04/18 09:27 Dose:  10 mg


Calcitriol (Rocaltrol)  0.5 mcg PO DAILY [facility]


   Last Admin: 12/04/18 09:28 Dose:  0.5 mcg


Calcium Acetate (Phoslo)  667 mg PO WM [facility]


   Last Admin: 12/04/18 17:11 Dose:  667 mg


Clonidine HCl (Catapres)  0.1 mg PO BID PRN


   PRN Reason: Systolic Blood Pressure


   Last Admin: 12/04/18 06:20 Dose:  0.1 mg


Heparin Sodium (Porcine) (Heparin)  2,000 units IVP MWF PRN; Protocol


   PRN Reason: DIALYSIS


Insulin Human Regular (Humulin R Med)  0 units SC [facility]; Protocol


   Last Admin: 12/04/18 22:14 Dose:  Not Given


Labetalol HCl (Trandate)  200 mg PO BID [facility]


   Last Admin: 12/04/18 17:11 Dose:  200 mg


Lisinopril (Zestril)  40 mg PO DAILY [facility]


   Last Admin: 12/04/18 09:27 Dose:  40 mg


Magnesium Oxide (Mag-Ox)  400 mg PO BID [facility]


   Stop: 12/06/18 10:01


   Last Admin: 12/04/18 17:11 Dose:  400 mg


Ondansetron HCl (Zofran Inj)  4 mg IVP Q6H PRN


   PRN Reason: Nausea/Vomiting


Vitamin D (Vitamin D 400 Intl Units Tab)  1,200 intlu PO DAILY [facility]


   Last Admin: 12/04/18 09:31 Dose:  Not Given











- Labs


Labs: 


                                        





                                 12/04/18 06:45 





                                 12/04/18 06:45 





                                        











PT  13.1 SECONDS (9.4-12.5)  H  11/30/18  06:00    


 


INR  1.14   11/30/18  06:00    


 


APTT  29.5 Seconds (25.1-36.5)   11/29/18  16:34    














- Additional Findings


Additional findings: 





- Constitutional


Appears: No Acute Distress, Resting comfortably in bed





- Head Exam


Head Exam: ATRAUMATIC, NORMAL INSPECTION





- Eye Exam


Eye Exam: EOMI, Normal appearance, No scleral icterus or conjunctival injection





- ENT Exam


ENT Exam: Mucous Membranes Moist, Normal Exam





- Neck Exam


Neck exam: Normal ROM, no Lymphadenopathy





- Respiratory Exam


Respiratory Exam: Clear to auscultation bilaterally, NORMAL BREATHING PATTERN, 

No respiratory distress





- Cardiovascular Exam


Cardiovascular Exam: REGULAR RHYTHM, +S1, +S2





- GI/Abdominal Exam


GI & Abdominal Exam: Soft, Minimally distended, Normal Bowel Sounds, No 

tenderness to palpation





- Extremities Exam


Extremities exam: Positive for: +1 pitting edema in bilateral LE from feet to 

mid-shins.  Negative for: calf tenderness





- Back Exam


Back exam: negative for CVA tenderness bilaterally





- Neurological Exam


Neurological exam: Awake and alert, following all commands, moving all 

extremities spontaneously





- Psychiatric Exam


Psychiatric exam: Normal mood and affect





- Skin


Skin Exam: Dry, Intact, Warm





Assessment and Plan





- Assessment and Plan (Free Text)


Assessment: 





This is a 54 yo M with PMH of IDDM (diagnosed 30+ yrs ago), HTN (~3 yrs), HLD 

(~3yrs), CKD, and chronic anemia presents to [facility] with complaints of shortness of

breath that hes noticed for the past 3 days that worsened while laying down at 

night and walking.  He was determined to have ESRD, and has begun HD.  Pending 

3rd round of HD, and has received 3 units pRBCs (transfused during HD) thus far,

with appropriate Hgb responses.  Established with outpatient HD site, likely 

pending discharge today as per primary team.


Plan: 





1) ESRD newly on HD


2) IDDM


3) HTN


4) HLD


5) Chronic anemia


6) Proteinuria


7) Hypomagnesemia - improving


8) Hypocalcemia


9) Hyperphosphatemia


10) Secondary hyperparathyroidism





-ESRD 2/2 worsening of prior CKD vs HTN nephropathy, unlikely Diabetic 

Nephropathy as A1c 6.1


   Pending 3rd round of HD, then likely discharge


   Established with [facility] for outpatient HD T/Th/Sat


   Established with Vascular Surgery for eventual AV fistula access, to follow


-Mag improved from 1.3 to 1.5, finish Mag repletion today


-Hyperphos, Hypocalcemia, and low Vit D consistent with secondary 

hyperparathyroidism 2/2 CKD/ERDS, continue Calcitriol, Phoslo, and Vit D 

supplementation


-Amlodipine, Lisinopril, and Labetalol for BP control


-A1c 6.1, may be able to transition from insulin sliding scale to oral 

hypoglycemics, defer to primary team


-Anemia from CKD/ESRD vs acute loss, no signs/sx of acute bleeding and iron 

studies not suggestive of severe iron deficiency


   S/p 3 units transfused with appropriate responses





Reviewed and discussed with attending, Dr. Peña





<Floyd Peña S - Last Filed: 12/06/18 19:39>





Objective





- Vital Signs/Intake and Output


Vital Signs (last 24 hours): 


                                        











Temp Pulse Resp BP Pulse Ox


 


 98.3 F   76   18   147/76   100 


 


 12/05/18 14:00  12/05/18 14:00  12/05/18 14:00  12/05/18 14:00  12/05/18 14:00








Intake and Output: 


                                        











 12/05/18 12/05/18





 06:59 18:59


 


Intake Total 180 


 


Balance 180 














- Labs


Labs: 


                                        





                                 12/05/18 07:00 





                                 12/05/18 06:30 





                                        











PT  13.1 SECONDS (9.4-12.5)  H  11/30/18  06:00    


 


INR  1.14   11/30/18  06:00    


 


APTT  29.5 Seconds (25.1-36.5)   11/29/18  16:34    














Assessment and Plan





- Assessment and Plan (Free Text)


Plan: 





Pt seen and examined. I reviewed the note of the medical resident and I agree 

with the note including the assessment and plan. I reviewed the medications and 

last labs. Pt with ESRD on HD. He will continue with HD as an outpt. He is 

feeling much better and is going home. Oupt HD has been arranged at [facility]. Pt should get dialysis spot prior to discharge. Will speak to 

[facility] to help expedite matters. Hypocalcemia is improving. Secondary 

hyperparathyroidism treated with Calcitriol and PhosLo.

## 2018-12-05 NOTE — CP.PCM.PN
Subjective





- Date & Time of Evaluation


Date of Evaluation: 12/05/18


Time of Evaluation: 14:57





- Subjective


Subjective: 





PGY1 Progress Note for Dr. Sanchez 


Patient seen and examined at bedside this morning. No acute nursing events 

overnight. Patient s/p dialysis cath placement 11/30. Patient is status-post 

dialysis today, and Patient tolerated well. Patient pending dialysis placement 

at this time. 12 Point ROS is otherwise unremarkable. 








Objective





- Vital Signs/Intake and Output


Vital Signs (last 24 hours): 


                                        











Temp Pulse Resp BP Pulse Ox


 


 98.3 F   76   18   147/76   100 


 


 12/05/18 14:00  12/05/18 14:00  12/05/18 14:00  12/05/18 14:00  12/05/18 14:00








Intake and Output: 


                                        











 12/05/18 12/05/18





 06:59 18:59


 


Intake Total 180 


 


Balance 180 














- Medications


Medications: 


                               Current Medications





Acetaminophen (Tylenol 325mg Tab)  650 mg PO Q4 PRN


   PRN Reason: Pain, Mild (1-3)


   Last Admin: 12/03/18 17:19 Dose:  650 mg


Amlodipine Besylate (Norvasc)  10 mg PO DAILY [facility]


   Last Admin: 12/04/18 09:27 Dose:  10 mg


Calcitriol (Rocaltrol)  0.5 mcg PO DAILY [facility]


   Last Admin: 12/05/18 10:00 Dose:  Not Given


Calcium Acetate (Phoslo)  667 mg PO WM [facility]


   Last Admin: 12/05/18 12:00 Dose:  Not Given


Clonidine HCl (Catapres)  0.1 mg PO BID PRN


   PRN Reason: Systolic Blood Pressure


   Last Admin: 12/04/18 06:20 Dose:  0.1 mg


Heparin Sodium (Porcine) (Heparin)  2,000 units IVP MWF PRN; Protocol


   PRN Reason: DIALYSIS


   Last Admin: 12/05/18 08:06 Dose:  2,000 units


Insulin Human Regular (Humulin R Med)  0 units SC [facility]; Protocol


   Last Admin: 12/05/18 12:00 Dose:  Not Given


Labetalol HCl (Trandate)  200 mg PO BID [facility]


   Last Admin: 12/05/18 10:00 Dose:  Not Given


Lisinopril (Zestril)  40 mg PO DAILY [facility]


   Last Admin: 12/04/18 09:27 Dose:  40 mg


Magnesium Oxide (Mag-Ox)  400 mg PO BID [facility]


   Stop: 12/06/18 10:01


   Last Admin: 12/05/18 11:00 Dose:  Not Given


Ondansetron HCl (Zofran Inj)  4 mg IVP Q6H PRN


   PRN Reason: Nausea/Vomiting


Vitamin D (Vitamin D 400 Intl Units Tab)  1,200 intlu PO DAILY [facility]


   Last Admin: 12/05/18 10:00 Dose:  Not Given











- Labs


Labs: 


                                        





                                 12/05/18 07:00 





                                 12/05/18 06:30 





                                        











PT  13.1 SECONDS (9.4-12.5)  H  11/30/18  06:00    


 


INR  1.14   11/30/18  06:00    


 


APTT  29.5 Seconds (25.1-36.5)   11/29/18  16:34    














- Additional Findings


Additional findings: 





- Constitutional


Appears: Non Toxic, In no acute distress





- Head Exam


Head Exam: NORMAL INSPECTION, NORMOCEPHALIC





- Eye Exam


Eye Exam: EOMI, Normal appearance





- ENT Exam


ENT Exam: Mucous Membranes Moist, Normal Exam





- Neck Exam


Neck Exam: Normal Inspection





- Respiratory Exam


Respiratory Exam: Clear to Ausculation Bilateral, NORMAL BREATHING PATTERN





- Cardiovascular Exam


Cardiovascular Exam: REGULAR RHYTHM, +S1, +S2





- GI/Abdominal Exam


GI & Abdominal Exam: Soft.  absent: Tenderness





- Extremities Exam


Extremities Exam: Normal Inspection.  absent: Calf Tenderness





- Back Exam


Back Exam: NORMAL INSPECTION





- Neurological Exam


Neurological Exam: Alert, Awake, Oriented x3





- Psychiatric Exam


Psychiatric exam: Normal Affect, Normal Mood





- Skin


Skin Exam: Dry, Intact, Warm





Assessment and Plan





- Assessment and Plan (Free Text)


Assessment: 





52 y/o M with PMH CKD, IDDM, HTN, HLD admitted for hyperkalemia, CHF exacerb

ation, rhabdomyolysis in the setting of acute on chronic kidney failure. Patient

s/p HD catheter placement (11/30). Patient status-post dialysis x3 and 

transfusions. Pending out-patient dialysis arrangement. 


 





Acute on chronic kidney failure


- Patient s/p HD catheter placement (11/30) 


- PT tolerated dialysis without issue


- Continue scheduled kayexalate


- Continue NS@ 50mls/hr


- Continue Sodium bicarbonate 650po tid


- Consult nephrology, appreciate recs


- Urine studies: electrolytes, 24hr creatinine, total protein, upep,  


- Hepatitis panel: lower lobe mass lesion on liver seen on renal ultrasound


- B/l echogeneity seen on renal u/s


- Vitamin D level, uric acid, 


- I&Os





Rule-out CHF Exacerbation


- Status-post cardiac cath


- BNP elevated


- ECHO obtained





Hyperkalemia, resolved 


- No EKG changes


- Given: Calcium gluconate 1g


- Continue kayexalate scheduled daily





Anemia of chronic kidney disease


- Consult GI for endoscopic workup


- Abdominal U/S: confirmation of mass identified on recent renal ultrasound. 

Please see report for details.  


- CT of abdomen / Liver ordered


- Iron studies within normal limits


- Type & Screen 


- On dialysis, transfuse as needed





Solitary Liver Mass Consistent with Hemangioma on Triple Phase CT


- 12/5 CT of Liver (Triple Phase): Contrast-enhancing characteristics of 

solitary mass in the right hepatic lobe consistent with hemangioma. 


- No other hepatic abnormalities. 


- Hep panel negative





Hx of HTN


- Continue home labetalol


- Continue home nifedipine





Hx of IDDM


- Sliding scale insulin


- Hypoglycemic protocol


- Accu-checks ACHS 





DVT ppx: SCD





Patient seen and case discussed in detail with Dr. Daniel Last PGY1

## 2018-12-10 NOTE — CP.PCM.DIS
Provider





- Provider


Date of Admission: 


11/29/18 15:29





Attending physician: 


Khadra Lee MD





Consults: 








11/29/18 15:11


Consult [Physician Consult] Stat 


   Comment: 


   Consulting Provider: Floyd Peña


   Consulting Physician: Floyd Peña


   Reason for Consult: kidney failure





11/29/18 16:02


Consult [Physician Consult] Routine 


   Comment: 


   Consulting Provider: Carlos A Qureshi


   Consulting Physician: Carlos A Qureshi


   Reason for Consult: heart failure





11/30/18 08:07


Consult [Physician Consult] Routine 


   Comment: 


   Consulting Provider: Kojo Whiting


   Consulting Physician: Kojo Whiting


   Reason for Consult: HD catheter placement





11/30/18 10:36


Gastroenterology Consult Routine 


   Comment: 


   Consulting Provider: Johnny Olivera


   Consulting Physician: Johnny Olivera


   Reason for Consult: Anemia, endoscopic w/u





12/02/18 08:59


Consult [Physician Consult] Routine 


   Comment: 


   Consulting Provider: Darin Clarke


   Consulting Physician: Darin Clarke


   Reason for Consult: eval for AV fistula for HD











Time Spent in preparation of Discharge (in minutes): 45





Diagnosis





- Discharge Diagnosis


(1) Hyperkalemia


Status: Acute   





(2) Renal failure


Status: Acute   





Hospital Course





- Lab Results


Lab Results: 


                                  Micro Results





11/29/18 23:28   Urine   Urine Culture - Final


                            No Growth (<1,000 CFU/ML)





                             Most Recent Lab Values











WBC  8.3 10^3/uL (4.5-11.0)   12/05/18  07:00    


 


RBC  2.98 10^6/uL (3.5-6.1)  L  12/05/18  07:00    


 


Hgb  9.1 g/dL (14.0-18.0)  L  12/05/18  07:00    


 


Hct  26.1 % (42.0-52.0)  L  12/05/18  07:00    


 


MCV  87.6 fl (80.0-105.0)   12/05/18  07:00    


 


MCH  30.5 pg (25.0-35.0)   12/05/18  07:00    


 


MCHC  34.9 g/dl (31.0-37.0)   12/05/18  07:00    


 


RDW  11.8 % (11.5-14.5)   12/05/18  07:00    


 


Plt Count  127 10^3/uL (120.0-450.0)   12/05/18  07:00    


 


MPV  72.7 fl (7.0-11.0)  H  12/05/18  07:00    


 


Gran %  15.4 % (50.0-68.0)  L  12/05/18  07:00    


 


Lymph % (Auto)  9.1 % (22.0-35.0)  L  12/05/18  07:00    


 


Mono % (Auto)  2.6 % (1.0-6.0)   12/05/18  07:00    


 


Eos % (Auto)  0.2 % (1.5-5.0)  L  12/05/18  07:00    


 


Baso % (Auto)  6.0 % (0.0-3.0)  H  12/05/18  07:00    


 


Gran #  6.03  (1.4-6.5)   12/05/18  07:00    


 


Lymph # (Auto)  1.3  (1.2-3.4)   12/05/18  07:00    


 


Mono # (Auto)  0.8  (0.1-0.6)  H  12/05/18  07:00    


 


Eos # (Auto)  0.2  (0.0-0.7)   12/05/18  07:00    


 


Baso # (Auto)  0.02 K/mm3 (0.0-2.0)   12/05/18  07:00    


 


Neutrophils % (Manual)  76 % (50.0-70.0)  H  12/05/18  07:00    


 


Lymphocytes % (Manual)  14 % (22.0-35.0)  L  12/05/18  07:00    


 


Monocytes % (Manual)  8 % (1.0-6.0)  H  12/05/18  07:00    


 


Eosinophils % (Manual)  2 % (0.0-3.0)   12/05/18  07:00    


 


Platelet Evaluation  Low  (NORMAL)   12/05/18  07:00    


 


Retic Count  2.37 % (0.5-1.5)  H  11/30/18  06:00    


 


Hemoglobin A  56.1 Percent (>96.0)  L  11/30/18  06:00    


 


Hemoglobin A2  4.9 Percent (1.8-3.5)  H  11/30/18  06:00    


 


Hemoglobin C  0.0 Percent (0.0-0.0)   11/30/18  06:00    


 


Hemoglobin F (Fetal)  <1.0 Percent (<2.0)   11/30/18  06:00    


 


Hemoglobin S  38.0 Percent (0.0-0.0)  H  11/30/18  06:00    


 


Variant Hemoglobin  0.0 Percent (0.0-0.0)   11/30/18  06:00    


 


Hemoglobinopathy Red Blood Count  2.31 Mill/mcL (4.20-5.80)  L  11/30/18  06:00 

  


 


Hemoglobinopathy Hct  21.3 % (38.5-50.0)  L  11/30/18  06:00    


 


Hemoglobinopathy Hgb  7.0 g/dL (13.2-17.1)  L  11/30/18  06:00    


 


Hemoglobinopathy MCV  91.9 fL (80.0-100.0)   11/30/18  06:00    


 


Hemoglobinopathy MCH  30.1 pg (27.0-33.0)   11/30/18  06:00    


 


Hemoglobinopathy RDW  13.1 % (11.0-15.0)   11/30/18  06:00    


 


Hemoglobinopathy Interp  See note   11/30/18  06:00    


 


PT  13.1 SECONDS (9.4-12.5)  H  11/30/18  06:00    


 


INR  1.14   11/30/18  06:00    


 


APTT  29.5 Seconds (25.1-36.5)   11/29/18  16:34    


 


D-Dimer, Quantitative  707 ng/mlDDU (0-243)  H  11/29/18  10:40    


 


Sodium  129 mmol/L (132-148)  L  12/05/18  06:30    


 


Potassium  4.0 mmol/L (3.6-5.0)   12/05/18  06:30    


 


Chloride  93 mmol/L ()  L  12/05/18  06:30    


 


Carbon Dioxide  25 mmol/L (21-33)   12/05/18  06:30    


 


Anion Gap  15  (10-20)   12/05/18  06:30    


 


BUN  62 mg/dL (7-21)  H  12/05/18  06:30    


 


Creatinine  9.4 mg/dl (0.8-1.5)  H* D 12/05/18  06:30    


 


Est GFR ( Amer)  7   12/05/18  06:30    


 


Est GFR (Non-Af Amer)  6   12/05/18  06:30    


 


POC Glucose (mg/dL)  129 mg/dL ()  H  12/05/18  06:37    


 


Random Glucose  127 mg/dL ()  H  12/05/18  06:30    


 


Hemoglobin A1c  6.1 % (4.2-6.5)   12/04/18  15:30    


 


Serum Osmolality  314 mosm/kg (272-300)  H  11/29/18  16:34    


 


Uric Acid  7.6 mg/dL (3.5-8.5)   11/29/18  16:34    


 


Calcium  6.6 mg/dL (8.4-10.5)  L*  12/05/18  06:30    


 


Phosphorus  7.6 mg/dL (2.5-4.5)  H  12/05/18  06:30    


 


Magnesium  1.5 mg/dL (1.7-2.2)  L  12/05/18  06:30    


 


Iron  64 ug/dL ()   11/30/18  06:00    


 


TIBC  265 ug/dL (261-462)   11/30/18  06:00    


 


% Saturation  24 % (20-55)   11/30/18  06:00    


 


Ferritin  94.2 ng/mL  11/30/18  06:00    


 


Total Bilirubin  0.6 mg/dL (0.2-1.3)   12/05/18  06:30    


 


AST  42 U/L (17-59)   12/05/18  06:30    


 


ALT  30 U/L (7-56)   12/05/18  06:30    


 


Alkaline Phosphatase  119 U/L ()   12/05/18  06:30    


 


Lactate Dehydrogenase  994 U/L (333-699)  H  12/01/18  07:00    


 


Total Creatine Kinase  2318 U/L ()  H  12/01/18  07:00    


 


CK-MB (CK-2)  6.9 ng/mL (0.0-3.6)  H  12/01/18  07:00    


 


CK-MB (CK-2) %  0.3 % (2.5-3.0)  L  12/01/18  07:00    


 


Troponin I  0.03 ng/mL  12/01/18  07:00    


 


NT-Pro-B Natriuret Pep  9500 pg/mL (0-450)  H  11/29/18  10:40    


 


Total Protein  6.1 g/dL (5.8-8.3)   12/05/18  06:30    


 


Total Protein (PEP)  5.7 g/dL (6.1-8.1)  L  11/29/18  16:34    


 


Albumin  2.9 g/dL (3.0-4.8)  L  12/05/18  06:30    


 


Albumin (PEP)  2.7 g/dL (3.8-4.8)  L  11/29/18  16:34    


 


Globulin  3.3 gm/dL  12/05/18  06:30    


 


Albumin/Globulin Ratio  0.9  (1.1-1.8)  L  12/05/18  06:30    


 


Alpha-1-Globulins  9.2 %  12/01/18  20:30    


 


Alpha-2-Globulins  9.2 %  12/01/18  20:30    


 


Beta Globulins  14.1 %  12/01/18  20:30    


 


Beta-1-Globulin  0.3 g/dL (0.4-0.6)  L  11/29/18  16:34    


 


Beta-2-Globulin  0.3 g/dL (0.2-0.5)   11/29/18  16:34    


 


Gamma Globulins  19.0 %  12/01/18  20:30    


 


Abnorm Protein Band 1  TEST NOT PERFORMED   11/29/18  16:34    


 


Abnorm Protein Band 2  TEST NOT PERFORMED   11/29/18  16:34    


 


Abnorm Protein Band 3  TEST NOT PERFORMED   11/29/18  16:34    


 


Triglycerides  87 mg/dL ()   12/02/18  07:00    


 


Cholesterol  132 mg/dL (130-200)   12/02/18  07:00    


 


LDL Cholesterol Direct  67 mg/dL (0-129)   12/02/18  07:00    


 


HDL Cholesterol  37 mg/dL (29-60)   12/02/18  07:00    


 


25-OH Vitamin D Total  < 12.8 NG/ML (30.0-100.0)  L  11/29/18  16:34    


 


PTH Intact Whole Molec  266 pg/mL (14-64)  H  11/30/18  06:00    


 


Urine Color  yellow  (YELLOW)   12/02/18  09:00    


 


Urine Appearance  Clear  (CLEAR)   12/02/18  09:00    


 


Urine pH  6.5  (4.7-8.0)   12/02/18  09:00    


 


Ur Specific Gravity  1.015  (1.005-1.035)   12/02/18  09:00    


 


Urine Protein  >=300 mg/dL (<30 mg/dL)  H  12/02/18  09:00    


 


Urine Glucose (UA)  100 mg/dL (NEGATIVE)  H  12/02/18  09:00    


 


Urine Ketones  Negative mg/dL (NEGATIVE)   12/02/18  09:00    


 


Urine Blood  Moderate  (NEGATIVE)  H  12/02/18  09:00    


 


Urine Nitrate  Negative  (NEGATIVE)   12/02/18  09:00    


 


Urine Bilirubin  Negative  (NEGATIVE)   12/02/18  09:00    


 


Urine Urobilinogen  0.2 E.U./dL (<1 E.U./dL)   12/02/18  09:00    


 


Ur Leukocyte Esterase  Moderate Anjel/uL (NEGATIVE)  H  12/02/18  09:00    


 


Urine RBC  20 - 25 /hpf (0-2)   12/02/18  09:00    


 


Urine WBC  25 - 30 /hpf (0-6)   12/02/18  09:00    


 


Ur Epithelial Cells  3 - 4 /hpf (0-5)   12/02/18  09:00    


 


Urine Eosinophils  Positive   12/02/18  08:50    


 


Urine Osmolality  180 mosm/kg (300-1000)  L  11/29/18  16:20    


 


Ur Random Creatinine  45 mg/dL  11/29/18  16:20    


 


Ur Random Sodium  37 meq/L  11/29/18  16:20    


 


Urine Collection Time  24 HOURS  12/01/18  20:30    


 


Urine Total Volume  1700 mL (800-1400)  H  12/01/18  20:30    


 


Ur 24 Hour Volume  1.67 liters   12/01/18  20:30    


 


Urine Creatinine  See above result   12/01/18  20:30    


 


Ur Creatinine 24 Hour  1.14 g/24 h (0.50-2.15)   12/01/18  20:30    


 


Ur Total Protein 24 Hr  58250 mg/24 h (<150)  H  12/01/18  20:30    


 


Protein/Creat Ratio 24h  9559 mg/g creat (< OR = 114)  H  12/01/18  20:30    


 


Urine Total Protein  See below result   12/01/18  20:30    


 


Ur Protein 24 Hr Calc  18320 mg/24HR ()  H  12/01/18  20:30    


 


Urine Albumin (PEP)  48.5 %  12/01/18  20:30    


 


Ur Protein Fractions  See note   12/01/18  20:30    


 


Stool Occult Blood  Negative  (NEGATIVE)   12/02/18  20:30    


 


FILEMON & SPEP Interp  See note   11/29/18  16:34    


 


ADRIANA Screen  Negative  (Negative)   11/29/18  16:34    


 


ADRIANA Titer  TEST NOT PERFORMED   11/29/18  16:34    


 


ADRIANA Titer 2  TEST NOT PERFORMED   11/29/18  16:34    


 


ADRIANA Pattern  TEST NOT PERFORMED   11/29/18  16:34    


 


ADRIANA Pattern 2  TEST NOT PERFORMED   11/29/18  16:34    


 


ANCA Screen  Negative  (NEGATIVE)   11/30/18  06:00    


 


c-ANCA Titer  TNP   11/30/18  06:00    


 


Proteinase 3 (PR3)  <1.0 AI (<1.0)   11/30/18  06:00    


 


p-ANCA Titer  TNP   11/30/18  06:00    


 


Atypical p-ANCA Titer  TNP   11/30/18  06:00    


 


Myeloperoxidase Ab  <1.0 AI (<1.0)   11/30/18  06:00    


 


SS-A Antibody  <1.0 AI (<1.0)   11/30/18  06:00    


 


SS-B Ab Interp  Negative  (Negative)   11/30/18  06:00    


 


SS-B Antibody  <1.0 AI (<1.0)   11/30/18  06:00    


 


SS-B Ab Interp  Negative  (Negative)   11/30/18  06:00    


 


Sm (Whiting) Antibody  <1.0 AI (<1.0)   11/30/18  06:00    


 


Anti-Smith Interpret  Negative  (Negative)   11/30/18  06:00    


 


Double Strand DNA Ab  <1 IU/mL  11/30/18  06:00    


 


Complement C3  99.0 mg/dL (88.0-165.0)   11/30/18  06:00    


 


Complement C4  32.9 mg/dL (14.0-44.0)   11/30/18  06:00    


 


Free Kappa Light Chains  173.4 mg/L (3.3-19.4)  H  11/30/18  06:00    


 


Free Lambda Light Chain  123.6 mg/L (5.7-26.3)  H  11/30/18  06:00    


 


Free Kappa/Lambda Ratio  1.40  (0.26-1.65)   11/30/18  06:00    


 


Hepatitis A IgM Ab  Negative  (NEGATIVE)   11/29/18  16:34    


 


Hep Bs Antigen  Negative  (NEGATIVE)   11/29/18  16:34    


 


Hep B Core IgM Ab  Negative  (NEGATIVE)   11/29/18  16:34    


 


Hepatitis C Antibody  Negative  (NEGATIVE)   11/29/18  16:34    


 


HIV 1&2 Ag/Ab, 4th Gen  Nonreactive  (Nonreactive)   11/29/18  16:34    


 


Blood Type  B POSITIVE   12/03/18  08:25    


 


Blood Type Confirm  B POSITIVE   11/29/18  15:37    


 


Antibody Screen  Negative   12/03/18  08:25    


 


Crossmatch  See Detail   12/03/18  08:25    


 


BBK History Checked  Patient has bt   12/03/18  08:25    














- Hospital Course


Hospital Course: 





Discharge Summary and Hospital Course for Dr. Sanchez


53-year-old Male with PMH CKD, IDDM, HTN, HLD admitted for hyperkalemia, CHF 

exacerbation, who came into [facility] with for shortness of breath 

and difficulty breathing while laying down and walking. Patient was found to 

have labs consistent with rhabdomyolysis in the setting of acute on chronic 

kidney failure. Please see complete report for details. Patient was admitted for

dialysis and underwent HD catheter placement (11/30/18). Please see complete 

report for details.Throughout the Patient's hospital stay, he underwent dialysis

x3 with transfusions as needed. Patient was pending outpatient dialysis 

placement however wanted to leave the hospital against medical advice on 

12/05/18. Please see progress notes for details. 





Of note, the patient underwent ultrasound of abdomen which revealed a mass. 

Triple Phase CT of the Liver which was obtained on 12/05/18 and revealed 

contrast-enhancing characteristics of solitary mass in the right hepatic lobe 

consistent with hemangioma. Please see complete report for details. 





On 12/05/18 patient requested to sign out AGAINST MEDICAL ADVICE (AMA). This 

action is against my medical advice to the patient and the decision was made 

with informed refusal. The patient was told that further workup is necessary and

a full explanation of the rationale was given. The risks of leaving were 

explained to the patient and include but are not limited to increased morbidity 

and mortality, death, and worsening of known or unknown conditions. Patient was 

AAOX3 and was able to make this informed decision and understood the clinical 

situation and my explanation of the risks of leaving. The patient voluntarily 

accepted these risks and signed an AMA form documenting our conversation. The 

patient was given the opportunity ask questions and reconsider. The patient was 

encouraged to return to emergency room at any time for further care. He was 

advised to follow-up with her primary care doctor as soon as possible. Please 

see Patient's complete chart for details.





Patient was seen and case was discussed in detail with Dr. Dnaiel Last PGY1





Discharge Exam





- Additional Findings


Additional findings: 





- Constitutional


Appears: Non Toxic, In no acute distress





- Head Exam


Head Exam: NORMAL INSPECTION, NORMOCEPHALIC





- Eye Exam


Eye Exam: EOMI, Normal appearance





- ENT Exam


ENT Exam: Mucous Membranes Moist, Normal Exam





- Neck Exam


Neck Exam: Normal Inspection





- Respiratory Exam


Respiratory Exam: Clear to Ausculation Bilateral, NORMAL BREATHING PATTERN





- Cardiovascular Exam


Cardiovascular Exam: REGULAR RHYTHM, +S1, +S2





- GI/Abdominal Exam


GI & Abdominal Exam: Soft.  absent: Tenderness





- Extremities Exam


Extremities Exam: Normal Inspection.  absent: Calf Tenderness





- Back Exam


Back Exam: NORMAL INSPECTION





- Neurological Exam


Neurological Exam: Alert, Awake, Oriented x3





- Psychiatric Exam


Psychiatric exam: Normal Affect, Normal Mood





- Skin


Skin Exam: Dry, Intact, Warm





Discharge Plan





- Discharge Medications


Prescriptions: 


amLODIPine [Norvasc] 10 mg PO DAILY #30 tab


Aspirin [Adult Low Dose Aspirin EC] 81 mg PO DAILY #30 tablet.


Atorvastatin [Lipitor] 80 mg PO DAILY #30 tab


Calcitriol [Rocaltrol] 0.5 mcg PO DAILY #30 sgl


Calcium Acetate [Phoslo] 667 mg PO WM #10 tab


Cholecalciferol 400 Intl Units [Vitamin D 400 Intl Units Tab] 1,200 intlu PO 

DAILY #90 tab


Labetalol [Trandate] 200 mg PO BID #60 tab


Lisinopril [Zestril] 40 mg PO DAILY #30 tab





- Follow Up Plan


Condition: GUARDED


Disposition: AGAINST MEDICAL ADVICE


Instructions:  Hemodialysis (DC), Traveling With Hemodialysis, Dialysis and 

Diet, Hyperkalemia (DC), Hyperkalemia (GEN)


Additional Instructions: 


Please follow up with your primary care doctor at the [facility] with Dr. Beltran on December 20th, 2018 at 01:00pm. 

Please call the number provided for this clinic once you are discharged to 

ensure that you have everything you need for your appointment. Please discuss 

all medical issues addressed and any new medications that your have been 

started.





Please follow up with your cardiologist within 3-5 days of being discharged from

 the hospital. Please discuss all medical issues addressed and any new medicati

ons that your have been started. You will need to have a 2D echocardiogram 

(ultrasound of your heart) after two weeks of dialysis.





Please follow up with your nephrologist within 3-5 days of being discharged from

 the hospital. Please discuss all medical issues addressed and any new 

medications that your have been started. You will be starting scheduled dialysis

 at [facility] on Tuesday, Thursday and Saturday at 

09:55am.





Please follow up with a gastroenterologist of your choice after being discharged

 from the hospital as it has been recommended that you have an EGD done in the 

future by the gastroenterologist that saw you while you were admitted at [facility]. 





Please DISCONTINUE the following medications:


1. Losartan


2. Nifedipine 





Please START taking the following medications:


1. Calcitriol 0.5mcg once daily


2. Phoslo 667mg on Wednesday and Monday


3. Vitamin D 400 intl units three tablets once daily


4. Lisinopril 40mg once daily


5. Norvasc 10mg once daily





The following CHANGES have been made to your home medications:


1. Labetalol has been INCREASED to 200mg twice daily





Please continue all medications as prescribed that you were previously taking at

 home that are not mentioned above.





If your symptoms return, please seek emergency medical attention immediately.


Referrals: 


[facility] [Outside]


[facility] [Outside]


Floyd Peña MD [Staff Provider] - 


Carlos A Qureshi MD [Staff Provider] - 


Johnny Olivera DO [Staff Provider] - 





Clinical Quality Measures





- Date & Time of Discharge Summary


Date of Discharge Summary: 12/05/18


Time of Discharge Summary: 16:30

## 2018-12-10 NOTE — PQF
PROVIDER RESPONSE TEXT:



Patient had left ventricular diastolic dysfunction.



REVIEWER QUERY TEXT:



CHF Acuity and Type



Congestive Heart Failure is documented in the Medical Record. Please document the type and acuity (in
cludes probable or suspected)

Such as:

Type:

-- Systolic

-- Diastolic

-- Combined

-- Other, please specify



Acuity:

-- Acute

-- Chronic

-- Acute on chronic

-- Other, please specify



Also please document the underlying cause of the CHF (includes probable or suspected)



The patient's Clinical Indicators include:

Please see below. Thank you.





Query created by: Sofia Washington on 12/6/2018 3:46 PM





Electronically signed by:  Khadra Lee MD 12/10/2018 6:59 AM

## 2019-01-09 NOTE — ED PDOC
Arrival/HPI





<Rashard Olguin - Last Filed: 01/10/19 00:09>





- General


Historian: Patient





- History of Present Illness


Narrative History of Present Illness (Text): 


01/09/19 23:09


Gregg Jerome is a 53 year old male, whose past medical history includes IDDM, 

hypertension, renal failure, and chronic anemia, who presents to the Emergency 

department complaining of 1 month history of worsening shortness of breath, 

mostly on exertion. Patient states he has not been able to sleep for the past 

few weeks. Patient also reports he had a dry cough that recently has become 

productive. Patient denies any chest pain, abdominal pain, nausea, vomiting, or 

any other complaints. Patient states he is still urinating. 


Symptom Onset: Gradual


Symptom Course: Unchanged


Activities at Onset: Light


Context: Home





<Albertina Moreira - Last Filed: 01/10/19 00:16>





- General


Chief Complaint: Shortness Of Breath


Time Seen by Provider: 01/09/19 19:48





Past Medical History





- Provider Review


Nursing Documentation Reviewed: Yes





- Infectious Disease


Hx of Infectious Diseases: None





- Cardiac


Hx Cardiac Disorders: Yes


Hx Hypertension: Yes





- Pulmonary


Hx Respiratory Disorders: No





- Neurological


Hx Neurological Disorder: No





- HEENT


Hx HEENT Disorder: No





- Renal


Hx Renal Disorder: No


Hx Dialysis: Yes


Type of Dialysis Access: R chest cath





- Endocrine/Metabolic


Hx Diabetes Mellitus Type 1: Yes





- Hematological/Oncological


Hx Blood Disorders: No





- Integumentary


Hx Dermatological Disorder: No





- Musculoskeletal/Rheumatological


Hx Musculoskeletal Disorders: No





- Gastrointestinal


Hx Gastrointestinal Disorders: No





- Genitourinary/Gynecological


Hx Genitourinary Disorders: No





- Psychiatric


Hx Psychophysiologic Disorder: No


Hx Substance Use: No





- Surgical History


Hx Cardiac Catheterization: Yes





- Anesthesia


Hx Anesthesia: No





<Albertina Moreira - Last Filed: 01/10/19 00:16>





Family/Social History





- Physician Review


Nursing Documentation Reviewed: Yes


Family/Social History: Unknown Family HX


Smoking Status: Former Smoker


Hx Alcohol Use: Yes


Hx Substance Use: No





<Albertina Moreira - Last Filed: 01/10/19 00:16>





Allergies/Home Meds





<Rashard Olguin - Last Filed: 01/10/19 00:09>





<Albertina Moreira - Last Filed: 01/10/19 00:16>


Allergies/Adverse Reactions: 


Allergies





No Known Allergies Allergy (Verified 01/09/19 20:01)


   











Review of Systems





- Physician Review


All systems were reviewed & negative as marked: Yes





- Review of Systems


Constitutional: Normal.  absent: Fevers


Eyes: Normal


ENT: Normal


Respiratory: SOB, Cough, Sputum


Cardiovascular: QUINTEROS


Gastrointestinal: Normal.  absent: Abdominal Pain, Diarrhea, Nausea, Vomiting


Musculoskeletal: Normal.  absent: Back Pain, Neck Pain


Skin: Normal.  absent: Rash


Neurological: Normal.  absent: Headache, Dizziness


Endocrine: Normal


Hemo/Lymphatic: Normal


Psychiatric: Normal





<Albertina Moreira - Last Filed: 01/10/19 00:16>





Physical Exam





Vital Signs











  Temp Pulse Resp BP Pulse Ox


 


 01/09/19 23:52   92 H  23  190/88 H  92 L


 


 01/09/19 23:13     190/101 H 


 


 01/09/19 22:12   91 H  20  189/99 H  100


 


 01/09/19 20:09   91 H  18  146/101 H  94 L


 


 01/09/19 20:04  97.9 F  90  18  216/90 H  96














<Rashard Olguin - Last Filed: 01/10/19 00:09>


Vital Signs Reviewed: Yes





Vital Signs











  Temp Pulse Resp BP Pulse Ox


 


 01/09/19 22:12   91 H  20  189/99 H  100


 


 01/09/19 20:09   91 H  18  146/101 H  94 L


 


 01/09/19 20:04  97.9 F  90  18  216/90 H  96











Temperature: Afebrile


Blood Pressure: Hypertensive


Pulse: Regular


Respiratory Rate: Normal


Appearance: Positive for: Well-Appearing, Non-Toxic, Comfortable


Pain Distress: None


Mental Status: Positive for: Alert and Oriented X 3





- Systems Exam


Head: Present: Atraumatic, Normocephalic


Pupils: Present: PERRL


Extroacular Muscles: Present: EOMI


Conjunctiva: Present: Normal


Mouth: Present: Moist Mucous Membranes


Neck: Present: Normal Range of Motion


Respiratory/Chest: Present: Rales (Rales at the bases), Other (Dialysis catheter

in right chest wall).  No: Respiratory Distress, Accessory Muscle Use


Cardiovascular: Present: Regular Rate and Rhythm, Normal S1, S2.  No: Murmurs


Abdomen: No: Tenderness, Distention, Peritoneal Signs


Back: Present: Normal Inspection


Upper Extremity: Present: Normal Inspection.  No: Cyanosis, Edema


Lower Extremity: Present: Edema (2+ pitting edema bilaterlly)


Neurological: Present: GCS=15, Speech Normal


Skin: Present: Warm, Dry, Normal Color.  No: Rashes


Psychiatric: Present: Alert, Oriented x 3





<Albertina Moreira T - Last Filed: 01/10/19 00:16>





Medical Decision Making





- Lab Interpretations


Lab Results: 





                                        











pCO2  25 mm/Hg (35-45)  L  01/09/19  21:10    


 


pO2  59.0 mm/Hg ()  L  01/09/19  21:10    


 


HCO3  13.8 mmol/L (21-28)  L  01/09/19  21:10    


 


ABG pH  7.35  (7.35-7.45)   01/09/19  21:10    


 


ABG Total CO2  14.6 mmol.L (22-28)  L  01/09/19  21:10    


 


ABG O2 Saturation  93.9 % (95-98)  L  01/09/19  21:10    


 


ABG Base Excess  -10.0 mmol/L (-2.0-3.0)  L  01/09/19  21:10    


 


ABG Potassium  6.5 mmol/L (3.6-5.2)  H*  01/09/19  21:10    


 


Sodium  136.0 mmol/L (132-148)   01/09/19  21:10    


 


Chloride  108.0 mmol/L ()  H  01/09/19  21:10    


 


Glucose  124 mg/dl ()  H  01/09/19  21:10    


 


Lactate  1.0 mmol/L (0.7-2.1)   01/09/19  21:10    


 


FiO2  21.0 %  01/09/19  21:10    








                                        











Troponin I  0.01 ng/mL D 01/09/19  20:30    








                                        











NT-Pro-B Natriuret Pep  66015 pg/mL (0-450)  H  01/09/19  20:30    








                                        











Total Bilirubin  0.6 mg/dL (0.2-1.3)   01/09/19  20:30    


 


AST  39 U/L (17-59)   01/09/19  20:30    


 


ALT  87 U/L (7-56)  H  01/09/19  20:30    


 


Alkaline Phosphatase  256 U/L ()  H D 01/09/19  20:30    


 


Total Protein  7.2 g/dL (5.8-8.3)   01/09/19  20:30    


 


Albumin  3.5 g/dL (3.0-4.8)   01/09/19  20:30    


 


Globulin  3.7 gm/dL  01/09/19  20:30    


 


Albumin/Globulin Ratio  0.9  (1.1-1.8)  L  01/09/19  20:30    














- RAD Interpretation


Radiology Orders: 











01/09/19 20:43


CHEST PORTABLE [RAD] Stat 














- Medication Orders


Current Medication Orders: 














Discontinued Medications





Albuterol Sulfate (Albuterol 0.5% Inhal Sol (5 Mg/ Ml) 20 Ml)  15 mg IH ONCE STA


   Stop: 01/09/19 21:36


   Last Admin: 01/09/19 22:10  Dose: 15 mg





Calcium Gluconate (Calcium Gluconate Iv)  1,000 mg IVP ONCE ONE


   Stop: 01/09/19 21:41


   Last Admin: 01/09/19 22:02  Dose: 1,000 mg





IVP Administration


 Document     01/09/19 22:02  CNR  (Rec: 01/09/19 22:02  CNR  [address])


     Charges for Administration


      # of IVP Administrations                   1





Dextrose (Dextrose 50% Inj)  50 ml IVP ONCE ONE


   Stop: 01/09/19 21:37


   Last Admin: 01/09/19 22:01  Dose: 50 ml





IVP Administration


 Document     01/09/19 22:01  CNR  (Rec: 01/09/19 22:01  CNR  [id number])


     Charges for Administration


      # of IVP Administrations                   1





Dextrose (Dextrose 50% Inj)  50 ml IVP STAT STA


   Stop: 01/09/19 23:46


   Last Admin: 01/09/19 23:41  Dose: 50 ml





IVP Administration


 Document     01/09/19 23:41  CNR  (Rec: 01/09/19 23:48  CNR  [id number])


     Charges for Administration


      # of IVP Administrations                   1





Furosemide (Lasix)  80 mg IVP STAT STA


   Stop: 01/09/19 23:03


   Last Admin: 01/09/19 23:13  Dose: 80 mg





MAR Blood Pressure


 Document     01/09/19 23:13  CNR  (Rec: 01/09/19 23:13  CNR  [id number])


     Blood Pressure


      Blood Pressure (100//90 mm Hg)       190/101


IVP Administration


 Document     01/09/19 23:13  CNR  (Rec: 01/09/19 23:13  CNR  [id number])


     Charges for Administration


      # of IVP Administrations                   1





Insulin Human Regular (Humulin R)  10 units IVP STAT STA


   Stop: 01/09/19 21:37


   Last Admin: 01/09/19 22:01  Dose: 10 u





IVP Administration


 Document     01/09/19 22:01  CNR  (Rec: 01/09/19 22:01  CNR  [id number])


     Charges for Administration


      # of IVP Administrations                   1





Sodium Bicarbonate (Sodium Bicarbonate 8.4% (50 Meq) Syringe)  50 meq IVP ONCE 

ONE


   Stop: 01/09/19 21:40


   Last Admin: 01/09/19 22:01  Dose: 50 meq





IVP Administration


 Document     01/09/19 22:01  CNR  (Rec: 01/09/19 22:02  CNR  [id number])


     Charges for Administration


      # of IVP Administrations                   1














<Rashard Olguin - Last Filed: 01/10/19 00:09>


ED Course and Treatment: 


53yr old male with worsening shortness of breath x 1 month. hx of dialysis last 

month with dialysis catheter to right chest wall. 





Plan:


-- EKG


-- Chest X-ray


-- CBC


-- CMP


-- ABG


-- Urinalysis, urine cultures


-- Blood cultures


-- Reassess and disposition





Progress Notes:


patient found to be in renal failure with creatinine of 16.7


anemia at 7.7


Potassium 6.6


BNP 26,700


Troponin 0 0.01 EKG shows normal sinus rhythm at 92 bpm normal axis no ST 

elevations no peak T waves








cxr; + vascular congestion








Patient was given insulin D50 bicarb and albuterol for elevated K. 





Case was discussed in depth with the nephrologist on-call Dr. Anton; he advised 

monitoring the potassium and adding 80 mg of Lasix IV.  If patient remains 

stable will hold off on dialysis until the morning.





pt placed on O2 @ 2L





pt reassessment; pt resting comfortably. o2 saturation 94%





Case was discussed with Dr. Dennis; accepts admission to ICU





all results discussed with patient in depth.





impression; renal failure, hyperkalemia, elevated bnp, shortness of breath


admit. 








- Lab Interpretations


Lab Results: 





                                        











pCO2  25 mm/Hg (35-45)  L  01/09/19  21:10    


 


pO2  59.0 mm/Hg ()  L  01/09/19  21:10    


 


HCO3  13.8 mmol/L (21-28)  L  01/09/19  21:10    


 


ABG pH  7.35  (7.35-7.45)   01/09/19  21:10    


 


ABG Total CO2  14.6 mmol.L (22-28)  L  01/09/19  21:10    


 


ABG O2 Saturation  93.9 % (95-98)  L  01/09/19  21:10    


 


ABG Base Excess  -10.0 mmol/L (-2.0-3.0)  L  01/09/19  21:10    


 


ABG Potassium  6.5 mmol/L (3.6-5.2)  H*  01/09/19  21:10    


 


Sodium  136.0 mmol/L (132-148)   01/09/19  21:10    


 


Chloride  108.0 mmol/L ()  H  01/09/19  21:10    


 


Glucose  124 mg/dl ()  H  01/09/19  21:10    


 


Lactate  1.0 mmol/L (0.7-2.1)   01/09/19  21:10    


 


FiO2  21.0 %  01/09/19  21:10    








                                        











Troponin I  0.01 ng/mL D 01/09/19  20:30    








                                        











NT-Pro-B Natriuret Pep  08272 pg/mL (0-450)  H  01/09/19  20:30    








                                        











Total Bilirubin  0.6 mg/dL (0.2-1.3)   01/09/19  20:30    


 


AST  39 U/L (17-59)   01/09/19  20:30    


 


ALT  87 U/L (7-56)  H  01/09/19  20:30    


 


Alkaline Phosphatase  256 U/L ()  H D 01/09/19  20:30    


 


Total Protein  7.2 g/dL (5.8-8.3)   01/09/19  20:30    


 


Albumin  3.5 g/dL (3.0-4.8)   01/09/19  20:30    


 


Globulin  3.7 gm/dL  01/09/19  20:30    


 


Albumin/Globulin Ratio  0.9  (1.1-1.8)  L  01/09/19  20:30    











I have reviewed the lab results: Yes





- RAD Interpretation


Radiology Orders: 











01/09/19 20:43


CHEST PORTABLE [RAD] Stat 











: ED Physician





- EKG Interpretation


Interpreted by ED Physician: Yes


Type: 12 lead EKG





- Medication Orders


Current Medication Orders: 











Furosemide (Lasix)  80 mg IVP STAT STA


   Stop: 01/09/19 23:03





Discontinued Medications





Albuterol Sulfate (Albuterol 0.5% Inhal Sol (5 Mg/ Ml) 20 Ml)  15 mg IH ONCE STA


   Stop: 01/09/19 21:36


   Last Admin: 01/09/19 22:10  Dose: 15 mg





Calcium Gluconate (Calcium Gluconate Iv)  1,000 mg IVP ONCE ONE


   Stop: 01/09/19 21:41


   Last Admin: 01/09/19 22:02  Dose: 1,000 mg





IVP Administration


 Document     01/09/19 22:02  CNR  (Rec: 01/09/19 22:02  CNR  [address])


     Charges for Administration


      # of IVP Administrations                   1





Dextrose (Dextrose 50% Inj)  50 ml IVP ONCE ONE


   Stop: 01/09/19 21:37


   Last Admin: 01/09/19 22:01  Dose: 50 ml





IVP Administration


 Document     01/09/19 22:01  CNR  (Rec: 01/09/19 22:01  CNR  [id number])


     Charges for Administration


      # of IVP Administrations                   1





Insulin Human Regular (Humulin R)  10 units IVP STAT STA


   Stop: 01/09/19 21:37


   Last Admin: 01/09/19 22:01  Dose: 10 u





IVP Administration


 Document     01/09/19 22:01  CNR  (Rec: 01/09/19 22:01  CNR  [id number])


     Charges for Administration


      # of IVP Administrations                   1





Sodium Bicarbonate (Sodium Bicarbonate 8.4% (50 Meq) Syringe)  50 meq IVP ONCE 

ONE


   Stop: 01/09/19 21:40


   Last Admin: 01/09/19 22:01  Dose: 50 meq





IVP Administration


 Document     01/09/19 22:01  CNR  (Rec: 01/09/19 22:02  CNR  [id number])


     Charges for Administration


      # of IVP Administrations                   1














<Albertina Moreira T - Last Filed: 01/10/19 00:16>





- PA / NP / Resident Statement


MD/ has reviewed & agrees with the documentation as recorded.


MD/ has examined the patient and agrees with the treatment plan.





<Rashard Olguin - Last Filed: 01/10/19 00:09>





- Scribe Statement


The provider has reviewed the documentation as recorded by the Nikia Valenzuela





Provider Scribe Attestation:


All medical record entries made by the Scribe were at my direction and 

personally dictated by me. I have reviewed the chart and agree that the record 

accurately reflects my personal performance of the history, physical exam, 

medical decision making, and the department course for this patient. I have also

 personally directed, reviewed, and agree with the discharge instructions and 

disposition.








<Albertina Moreira - Last Filed: 01/10/19 00:16>





Disposition/Present on Arrival





<Rashard Olguin - Last Filed: 01/10/19 00:09>





- Present on Arrival


Any Indicators Present on Arrival: No


History of DVT/PE: No


History of Uncontrolled Diabetes: No


Urinary Catheter: No


History of Decub. Ulcer: No


History Surgical Site Infection Following: None





- Disposition


Have Diagnosis and Disposition been Completed?: Yes


Disposition Time: 21:40


Patient Plan: Admission





<Albertina Moreira - Last Filed: 01/10/19 00:16>





- Disposition


Diagnosis: 


 Renal failure, Hyperkalemia





Disposition: HOSPITALIZED


Patient Problems: 


                             Current Active Problems











Problem Status Onset


 


Hyperkalemia Acute 


 


Renal failure Acute 











Condition: CRITICAL


Referrals: 


PCP,NO [Primary Care Provider] - Follow up with primary


Forms:  Moven (English)

## 2019-01-10 NOTE — CP.PCM.CON
History of Present Illness





- History of Present Illness


History of Present Illness: 


Nephrology Consultation Note: 





Assessment: critical


Uremia, missed HD/non-compliance, hyperkalemia, fluid overload, HTN emergency


Diabetic chronic Kidney Disease (E11.22) 


Hypertensive Chronic Kidney Disease (I12.0)


End stage renal disease (N18.6) dependence on hemodialysis (Z99.2) via PC


Anemia (D64.9), Hyperphosphatemia (E83.39), Secondary Hyperparathyroidism 

(E21.1), HTN (I12.0)





Plan:


Will plan for HD today as ordered. Will plan for dialysis tomorrow. Continue 

with Nephrovite 1 tab/day. 


PRBC as needed for anemia. on GIA with dialysis as last Hb 7.4 TSAT 24% Ferritin

94, added 1 gram IV iron loading dose


Continue with phos binders, last phos level 8.4


discontinue with calcitriol. last  @ goal. added weekly Vit D as last 

level <12.8


BP control with meds as ordered. Patient not on RAAS blocker as with 

hyperkalemia, hope to add soon. resume norvasc and labetalol. titrate off 

nicardipine drip


Glycemic control, Dialysis consistent diet


Further work up/management as per primary team


Dose meds/antibiotics (if needed) for ESRD status. Avoid fleets enema/magnesium 

based laxatives.


pt was educated about risks of missing HD. 


SW consult for outpt HD placement


vascular surgery consult for AVF placement





Thanks for allowing me to participate in care of your patient. Will follow 

patient with you. Please call if any Qs. had d/w team


Dr Otis Anton


Office: [phone] Cell: [phone] Fax: [phone]





Chief Complaint;feels sick


HPI: Pt is a 53 M with hx of ESRD recently started on hemodialysis via 

permacathbut pt non compliant with HD, hadn't received HD since left from 

hospital AMA , chronic anemia, hyperphosphatemia, secondary hyperparathyroidism,

Diabetes Mellitus (30 years), hypertension presented with complaints of 

worsening SOB on exertion and HTN emergency


Renal consult requested for ESRD management. 


pt feels sick





ROS: 


Cardiovascular: No chest pain. 


Pulmonary: improved shortness of breath 


Gastrointestinal: denies abdominal pain No nausea. No vomiting. 


Genitourinary: No pain while urinating. Denies blood in urine. 


All other negative except as mentioned in HPI





Physical Examination:       


General Appearance: Comfortable, in no acute respiratory distress, co-operative 

. obese


Vitals reviewed and noted as below


Head; Atraumatic, normocephalic


ENT: no ulcers no thrush. Tongue is midline. Oropharynx: no rash or ulcers.


EYES: Pupils are equal, round and reactive to light accommodation. Eye muscles 

and extraocular movement intact. Sclera is anicteric.


Neck; supple no lymphadenopathy, no thyromegaly or bruit


Lungs: Normal respiratory rate/effort. Breath sounds bilateral reduced at bases 

with crackles


Heart: Normal rate. s1s2 normal. No rub or gallop. 


Extremities: 1+ edema. No varicose veins


Neurological: Patient is alert, awake and oriented to person, place and time. No

focal deficit. Strength bilateral appropriate and equal


Skin: Warm and dry. Normal turgor. No rash. Palpitation: Normal elasticity for 

age


Abdomen: Abdomen is soft. Bowel sounds +. There is no abdominal tenderness, no 

guarding/rigidity or organomegaly


Psych: limited insight and flat affect/mood


MSK: no joint tenderness or swelling. Digits and nails normal, no deformity


: kidney or bladder not palpable


Access: permacath





Labs/imaging reviewed.


Past medical history, past surgical history, family history, social history, 

allergy reviewed and noted as below


Family Hx: no hx of CKD. Non contributory 











Past Patient History





- Infectious Disease


Hx of Infectious Diseases: None





- Past Medical History & Family History


Past Medical History?: Yes





- Past Social History


Smoking Status: Former Smoker





- CARDIAC


Hx Cardiac Disorders: Yes


Hx Hypertension: Yes





- PULMONARY


Hx Respiratory Disorders: No





- NEUROLOGICAL


Hx Neurological Disorder: No





- HEENT


Hx HEENT Problems: No





- RENAL


Hx Chronic Kidney Disease: No


Hx Dialysis: Yes


Type of Dialysis Access: R chest cath





- ENDOCRINE/METABOLIC


Hx Diabetes Mellitus Type 1: Yes





- HEMATOLOGICAL/ONCOLOGICAL


Hx Blood Disorders: No





- INTEGUMENTARY


Hx Dermatological Problems: No





- MUSCULOSKELETAL/RHEUMATOLOGICAL


Hx Musculoskeletal Disorders: No





- GASTROINTESTINAL


Hx Gastrointestinal Disorders: No





- GENITOURINARY/GYNECOLOGICAL


Hx Genitourinary Disorders: No





- PSYCHIATRIC


Hx Psychophysiologic Disorder: No


Hx Substance Use: No





- SURGICAL HISTORY


Hx Cardiac Catheterization: Yes





- ANESTHESIA


Hx Anesthesia: No





Meds


Allergies/Adverse Reactions: 


                                    Allergies











Allergy/AdvReac Type Severity Reaction Status Date / Time


 


No Known Allergies Allergy   Verified 01/09/19 20:01














- Medications


Medications: 


                               Current Medications





Amlodipine Besylate (Norvasc)  10 mg PO DAILY [facility]


   Last Admin: 01/10/19 11:42 Dose:  10 mg


Atorvastatin Calcium (Lipitor)  40 mg PO HS [facility]


Calcium Acetate (Phoslo)  1,334 mg PO WM [facility]


   Last Admin: 01/10/19 11:43 Dose:  1,334 mg


Darbepoetin Iglesia (Aranesp)  100 mcg IVP QWK [facility]


Dextrose (Dextrose 50% Inj)  0 ml IV STAT PRN; Protocol


   PRN Reason: Hypoglycemia Protocol


Ergocalciferol (Drisdol 50,000 Intl Units Cap)  1 cap PO Q7D [facility]


   Last Admin: 01/10/19 11:41 Dose:  1 cap


Heparin Sodium (Porcine) (Heparin)  2,000 units IVP TTS [facility]; Protocol


Nicardipine HCl (Cardene Iv Premix)  20 mg in 200 mls @ 50 mls/hr IV .Q4H PRN; 

Protocol


   PRN Reason: TITRATE PER MD ORDER


   Last Admin: 01/10/19 09:18 Dose:  5 mg/hr, 50 mls/hr


Azithromycin (Zithromax 500mg In Ns)  500 mg in 250 mls @ 167 mls/hr IVPB DAILY 

[facility]; Protocol


   Last Admin: 01/10/19 11:50 Dose:  167 mls/hr


Dextrose (Dextrose 5% In Water 1000 Ml)  1,000 mls @ 0 mls/hr IV .Q0M PRN; 

Protocol


   PRN Reason: Hypoglycemia Protocol


Iron Sucrose 100 mg/ Sodium (Chloride)  105 mls @ 210 mls/hr IVPB DAILY [facility]


   Stop: 01/20/19 11:16


Insulin Human Lispro (Humalog Low)  0 units SC ACHS [facility]; Protocol


   Last Admin: 01/10/19 11:32 Dose:  Not Given


Labetalol HCl (Trandate)  200 mg PO BID [facility]


   Last Admin: 01/10/19 11:41 Dose:  200 mg


Pantoprazole Sodium (Protonix Inj)  40 mg IVP DAILY [facility]


   Last Admin: 01/10/19 11:43 Dose:  40 mg


Vitamin B Complex/Vit C/Folic Acid (Nephro-Dave)  1 tab PO 0800 [facility]











Results





- Vital Signs


Recent Vital Signs: 


                                Last Vital Signs











Temp  97.7 F   01/10/19 12:00


 


Pulse  93 H  01/10/19 12:59


 


Resp  19   01/10/19 12:59


 


BP  161/73 H  01/10/19 13:00


 


Pulse Ox  98   01/10/19 12:59














- Labs


Result Diagrams: 


                                 01/10/19 05:59





                                 01/10/19 13:00


Labs: 


                         Laboratory Results - last 24 hr











  01/09/19 01/09/19 01/09/19





  20:30 20:30 20:30


 


WBC   10.5  D 


 


RBC   2.62 L 


 


Hgb   7.7 L 


 


Hct   22.3 L 


 


MCV   85.1 


 


MCH   29.4 


 


MCHC   34.5 


 


RDW   13.1 


 


Plt Count   260 


 


MPV   10.4 


 


Gran %   79.2 H 


 


Lymph % (Auto)   13.0 L 


 


Mono % (Auto)   5.2 


 


Eos % (Auto)   2.4 


 


Baso % (Auto)   0.2 


 


Gran #   8.31 H 


 


Lymph # (Auto)   1.4 


 


Mono # (Auto)   0.5 


 


Eos # (Auto)   0.3 


 


Baso # (Auto)   0.02 


 


PT    12.5


 


INR    1.09


 


APTT    30.8


 


pCO2   


 


pO2   


 


HCO3   


 


ABG pH   


 


ABG Total CO2   


 


ABG O2 Saturation   


 


ABG Base Excess   


 


ABG Potassium   


 


Glucose   


 


Lactate   


 


FiO2   


 


Sodium  139  


 


Potassium  6.6 H* D  


 


Chloride  109 H  


 


Carbon Dioxide  17 L  


 


Anion Gap  19  


 


BUN  117 H  


 


Creatinine  16.7 H* D  


 


Est GFR ( Amer)  4  


 


Est GFR (Non-Af Amer)  3  


 


POC Glucose (mg/dL)   


 


Random Glucose  110  


 


Calcium  6.2 L*  


 


Phosphorus   


 


Magnesium   


 


Total Bilirubin  0.6  


 


AST  39  


 


ALT  87 H  


 


Alkaline Phosphatase  256 H D  


 


Lactate Dehydrogenase  1531 H  


 


Total Creatine Kinase  6783 H  


 


CK-MB (CK-2)  16.0 H  


 


CK-MB (CK-2) %  0.2 L  


 


Troponin I  0.01  D  


 


NT-Pro-B Natriuret Pep  09405 H  


 


Total Protein  7.2  


 


Albumin  3.5  


 


Globulin  3.7  


 


Albumin/Globulin Ratio  0.9 L  


 


Arterial Blood Potassium   


 


Hep Bs Antigen   


 


Hep Bs Antibody   


 


Hep B Core IgM Ab   


 


Blood Type   


 


Antibody Screen   


 


BBK History Checked   














  01/09/19 01/09/19 01/09/19





  21:10 23:30 23:37


 


WBC   


 


RBC   


 


Hgb   


 


Hct   


 


MCV   


 


MCH   


 


MCHC   


 


RDW   


 


Plt Count   


 


MPV   


 


Gran %   


 


Lymph % (Auto)   


 


Mono % (Auto)   


 


Eos % (Auto)   


 


Baso % (Auto)   


 


Gran #   


 


Lymph # (Auto)   


 


Mono # (Auto)   


 


Eos # (Auto)   


 


Baso # (Auto)   


 


PT   


 


INR   


 


APTT   


 


pCO2  25 L  


 


pO2  59.0 L  


 


HCO3  13.8 L  


 


ABG pH  7.35  


 


ABG Total CO2  14.6 L  


 


ABG O2 Saturation  93.9 L  


 


ABG Base Excess  -10.0 L  


 


ABG Potassium  6.5 H*  


 


Glucose  124 H  


 


Lactate  1.0  


 


FiO2  21.0  


 


Sodium  136.0  


 


Potassium   


 


Chloride  108.0 H  


 


Carbon Dioxide   


 


Anion Gap   


 


BUN   


 


Creatinine   


 


Est GFR ( Amer)   


 


Est GFR (Non-Af Amer)   


 


POC Glucose (mg/dL)    40 L


 


Random Glucose   


 


Calcium   


 


Phosphorus   


 


Magnesium   


 


Total Bilirubin   


 


AST   


 


ALT   


 


Alkaline Phosphatase   


 


Lactate Dehydrogenase   


 


Total Creatine Kinase   


 


CK-MB (CK-2)   


 


CK-MB (CK-2) %   


 


Troponin I   


 


NT-Pro-B Natriuret Pep   


 


Total Protein   


 


Albumin   


 


Globulin   


 


Albumin/Globulin Ratio   


 


Arterial Blood Potassium  6.5 H*  


 


Hep Bs Antigen   


 


Hep Bs Antibody   


 


Hep B Core IgM Ab   


 


Blood Type   B POSITIVE 


 


Antibody Screen   Negative 


 


BBK History Checked   Patient has bt 














  01/10/19 01/10/19 01/10/19





  00:30 05:59 06:30


 


WBC   11.0 


 


RBC   2.55 L 


 


Hgb   7.4 L 


 


Hct   21.5 L 


 


MCV   84.3 


 


MCH   29.0 


 


MCHC   34.4 


 


RDW   13.1 


 


Plt Count   273 


 


MPV   10.4 


 


Gran %   80.9 H 


 


Lymph % (Auto)   12.4 L 


 


Mono % (Auto)   4.4 


 


Eos % (Auto)   2.1 


 


Baso % (Auto)   0.2 


 


Gran #   8.91 H 


 


Lymph # (Auto)   1.4 


 


Mono # (Auto)   0.5 


 


Eos # (Auto)   0.2 


 


Baso # (Auto)   0.02 


 


PT   


 


INR   


 


APTT   


 


pCO2   


 


pO2   


 


HCO3   


 


ABG pH   


 


ABG Total CO2   


 


ABG O2 Saturation   


 


ABG Base Excess   


 


ABG Potassium   


 


Glucose   


 


Lactate   


 


FiO2   


 


Sodium    139


 


Potassium    6.0 H*


 


Chloride    109 H


 


Carbon Dioxide    16 L


 


Anion Gap    20


 


BUN    112 H


 


Creatinine    17.7 H*


 


Est GFR ( Amer)    3


 


Est GFR (Non-Af Amer)    3


 


POC Glucose (mg/dL)  82  


 


Random Glucose    122 H


 


Calcium    6.4 L*


 


Phosphorus   


 


Magnesium   


 


Total Bilirubin    0.4


 


AST    63 H D


 


ALT    90 H


 


Alkaline Phosphatase    253 H


 


Lactate Dehydrogenase   


 


Total Creatine Kinase   


 


CK-MB (CK-2)   


 


CK-MB (CK-2) %   


 


Troponin I   


 


NT-Pro-B Natriuret Pep   


 


Total Protein    6.9


 


Albumin    3.4


 


Globulin    3.5


 


Albumin/Globulin Ratio    1.0 L


 


Arterial Blood Potassium   


 


Hep Bs Antigen   


 


Hep Bs Antibody   


 


Hep B Core IgM Ab   


 


Blood Type   


 


Antibody Screen   


 


BBK History Checked   














  01/10/19 01/10/19 01/10/19





  07:38 08:30 08:30


 


WBC   


 


RBC   


 


Hgb   


 


Hct   


 


MCV   


 


MCH   


 


MCHC   


 


RDW   


 


Plt Count   


 


MPV   


 


Gran %   


 


Lymph % (Auto)   


 


Mono % (Auto)   


 


Eos % (Auto)   


 


Baso % (Auto)   


 


Gran #   


 


Lymph # (Auto)   


 


Mono # (Auto)   


 


Eos # (Auto)   


 


Baso # (Auto)   


 


PT   


 


INR   


 


APTT   


 


pCO2   


 


pO2   


 


HCO3   


 


ABG pH   


 


ABG Total CO2   


 


ABG O2 Saturation   


 


ABG Base Excess   


 


ABG Potassium   


 


Glucose   


 


Lactate   


 


FiO2   


 


Sodium   


 


Potassium   


 


Chloride   


 


Carbon Dioxide   


 


Anion Gap   


 


BUN   


 


Creatinine   


 


Est GFR ( Amer)   


 


Est GFR (Non-Af Amer)   


 


POC Glucose (mg/dL)  221 H  


 


Random Glucose   


 


Calcium   


 


Phosphorus   


 


Magnesium   


 


Total Bilirubin   


 


AST   


 


ALT   


 


Alkaline Phosphatase   


 


Lactate Dehydrogenase   


 


Total Creatine Kinase   


 


CK-MB (CK-2)   


 


CK-MB (CK-2) %   


 


Troponin I   


 


NT-Pro-B Natriuret Pep   


 


Total Protein   


 


Albumin   


 


Globulin   


 


Albumin/Globulin Ratio   


 


Arterial Blood Potassium   


 


Hep Bs Antigen   Negative 


 


Hep Bs Antibody    Negative


 


Hep B Core IgM Ab   Negative 


 


Blood Type   


 


Antibody Screen   


 


BBK History Checked   














  01/10/19 01/10/19 01/10/19





  08:37 10:20 10:20


 


WBC   


 


RBC   


 


Hgb   


 


Hct   


 


MCV   


 


MCH   


 


MCHC   


 


RDW   


 


Plt Count   


 


MPV   


 


Gran %   


 


Lymph % (Auto)   


 


Mono % (Auto)   


 


Eos % (Auto)   


 


Baso % (Auto)   


 


Gran #   


 


Lymph # (Auto)   


 


Mono # (Auto)   


 


Eos # (Auto)   


 


Baso # (Auto)   


 


PT   


 


INR   


 


APTT   


 


pCO2   


 


pO2   


 


HCO3   


 


ABG pH   


 


ABG Total CO2   


 


ABG O2 Saturation   


 


ABG Base Excess   


 


ABG Potassium   


 


Glucose   


 


Lactate   


 


FiO2   


 


Sodium   


 


Potassium   


 


Chloride   


 


Carbon Dioxide   


 


Anion Gap   


 


BUN   


 


Creatinine   


 


Est GFR ( Amer)   


 


Est GFR (Non-Af Amer)   


 


POC Glucose (mg/dL)   


 


Random Glucose   


 


Calcium   


 


Phosphorus  8.5 H  


 


Magnesium  1.6 L  


 


Total Bilirubin   


 


AST   


 


ALT   


 


Alkaline Phosphatase   


 


Lactate Dehydrogenase   


 


Total Creatine Kinase   5698 H 


 


CK-MB (CK-2)   13.2 H 


 


CK-MB (CK-2) %   0.2 L 


 


Troponin I    0.03  D


 


NT-Pro-B Natriuret Pep   


 


Total Protein   


 


Albumin   


 


Globulin   


 


Albumin/Globulin Ratio   


 


Arterial Blood Potassium   


 


Hep Bs Antigen   


 


Hep Bs Antibody   


 


Hep B Core IgM Ab   


 


Blood Type   


 


Antibody Screen   


 


BBK History Checked   














  01/10/19 01/10/19 01/10/19





  11:10 13:00 13:43


 


WBC   


 


RBC   


 


Hgb   


 


Hct   


 


MCV   


 


MCH   


 


MCHC   


 


RDW   


 


Plt Count   


 


MPV   


 


Gran %   


 


Lymph % (Auto)   


 


Mono % (Auto)   


 


Eos % (Auto)   


 


Baso % (Auto)   


 


Gran #   


 


Lymph # (Auto)   


 


Mono # (Auto)   


 


Eos # (Auto)   


 


Baso # (Auto)   


 


PT   


 


INR   


 


APTT   


 


pCO2   


 


pO2   


 


HCO3   


 


ABG pH   


 


ABG Total CO2   


 


ABG O2 Saturation   


 


ABG Base Excess   


 


ABG Potassium   


 


Glucose   


 


Lactate   


 


FiO2   


 


Sodium   135 


 


Potassium   3.8 


 


Chloride   100 


 


Carbon Dioxide   25 


 


Anion Gap   14 


 


BUN   53 H 


 


Creatinine   9.6 H* D 


 


Est GFR ( Amer)   7 


 


Est GFR (Non-Af Amer)   6 


 


POC Glucose (mg/dL)  137 H   211 H


 


Random Glucose   185 H 


 


Calcium   7.1 L 


 


Phosphorus   


 


Magnesium   


 


Total Bilirubin   


 


AST   


 


ALT   


 


Alkaline Phosphatase   


 


Lactate Dehydrogenase   


 


Total Creatine Kinase   


 


CK-MB (CK-2)   


 


CK-MB (CK-2) %   


 


Troponin I   


 


NT-Pro-B Natriuret Pep   


 


Total Protein   


 


Albumin   


 


Globulin   


 


Albumin/Globulin Ratio   


 


Arterial Blood Potassium   


 


Hep Bs Antigen   


 


Hep Bs Antibody   


 


Hep B Core IgM Ab   


 


Blood Type   


 


Antibody Screen   


 


BBK History Checked

## 2019-01-10 NOTE — CARD
--------------- APPROVED REPORT --------------





Date of service: 01/09/2019



EKG Measurement

Heart [id number]

MA [id number]

[id number]

[id number]

[id number]



<Conclusion>

Normal sinus rhythm

Normal ECG

## 2019-01-10 NOTE — CON
DATE:  01/10/2019



HISTORY OF PRESENT ILLNESS:  I saw Mr. Jerome this morning.  He is a

53-year-old black male noted to have past medical history of diabetes,

hypertension, longstanding anemia, chronic kidney disease, admitted with

complaints of shortness of breath and cough.  The patient indicated

increase in symptoms with cold weather.  Since last evaluated in report of December, the patient indicated increase in abdominal distension, but no

episodes of melena or gross hematochezia.  He did indicate a

productive cough, his sputum may have been blood tinged.  There have been no

episodes of severe chest pain, heartburn, dysphagia, or melena.  The

patient indicated bowel movements are brown.  Note that when I evaluated

this patient previously, he did indicate that he underwent a colonoscopy on

a prior occasion.  Since last evaluated, the patient has not undergone

dialysis nor has he undergone any endoscopic evaluation which by the way

was denied during his previous admission.  Note that the anemia which I

evaluated back in December was felt more likely due to renal disease rather

than a GI bleed.



PHYSICAL EXAMINATION:

VITAL SIGNS:  I reviewed this patient's vital signs.

HEENT:  Noncontributory.

LUNGS:  Decreased breath sounds at base or bilaterally about half way up

the apex.

HEART:  Irregular rhythm.

ABDOMEN:  Protuberant.  The patient states the abdomen is still tighter

than usual, it is more distended.



LABORATORY DATA:  I reviewed this patient's laboratory data, significant

for white count 10,000 with an H and H 7/22, platelet count 260.  INR is

within normal limits.  Chemistry indicates potassium is 6.6 as of 08:30

last night with a creatinine 16.7 and .  His alk phos 256.  Lactate

dehydrogenase 1531 with a creatine kinase 6783.  His BNP is 27,000.



ASSESSMENT AND PLAN:  This is a 53-year-old black male, who

has been hedging on the thought of starting dialysis.  The patient is aware

at the current time point, he will need to start hemodialysis as soon as

possible.



Note that  he denies any acute upper gastrointestinal bleed

or rectal bleeding.  He has been coughing forcefullywhich most likely produced

blood-tinged sputum.



Note that the patient does not have any abdominal pain, diarrhea, or

evidence of blood per rectum.  I do not feel endoscopic evaluation is

warranted at the current time point; however, the dialysis should be

started as soon as possible.



As an addendum, note that this patient had ultrasound evaluation before, it

was significant for patent portal vein, also there was a mass in the right

hepatic lobe which is hyperechoic 6 x 6 x 6, also the gallbladder is

noncontributory.







__________________________________________

Johnny Olivera DO, PhD





DD:  01/10/2019 5:14:26

DT:  01/10/2019 8:53:43

Job # [id number]

[email or web address]

## 2019-01-10 NOTE — CP.CCUPN
<Serafin Bull - Last Filed: 01/10/19 12:06>





CCU Subjective





- Physician Review


Subjective (Free Text): 





01/10/19 10:17


Javier Bull PGY2 - ICU Progress Note





Patient seen and examined in ICU sitting in bed resting comfortable. Patient 

indicates he feels better from admission, no difficulties with respirations, 

denies chest pain, abdominal pain, nausea, vomiting, fever. Reports minimal 

chills, fatigue.





CCU Objective





- Vital Signs / Intake & Output


Intake and Output (Last 8hrs): 


                                 Intake & Output











 01/09/19 01/10/19 01/10/19





 22:59 06:59 14:59


 


Intake Total  380 200


 


Output Total  200 


 


Balance  180 200


 


Weight 195 lb 195 lb 


 


Intake:   


 


  IV  200 200


 


    Right Antecubital  200 


 


  Oral  180 


 


Output:   


 


  Urine  200 


 


    Urine, Voided  200 


 


Other:   


 


  # Voids   


 


    Urine, Voided  1 














- Physical Exam


Head: Positive for: Atraumatic, Normocephalic


Pupils: Positive for: PERRL


Extroacular Muscles: Positive for: EOMI


Conjunctiva: Positive for: Normal


Mouth: Positive for: Moist Mucous Membranes


Neck: Positive for: Normal Range of Motion


Respiratory/Chest: Positive for: Other (Dialysis catheter in right chest wall). 

Negative for: Respiratory Distress, Accessory Muscle Use


Cardiovascular: Positive for: Regular Rate and Rhythm, Normal S1, S2.  Negative 

for: Murmurs


Abdomen: Negative for: Tenderness, Distention, Peritoneal Signs


Back: Positive for: Normal Inspection


Upper Extremity: Positive for: Normal Inspection.  Negative for: Cyanosis, Edema


Lower Extremity: Positive for: Edema (1+ edema bilaterlly)


Neurological: Positive for: GCS=15, Speech Normal


Skin: Positive for: Warm, Dry, Normal Color.  Negative for: Rashes


Psychiatric: Positive for: Alert, Oriented x 3





- Medications


Active Medications: 


Active Medications











Generic Name Dose Route Start Last Admin





  Trade Name Freq  PRN Reason Stop Dose Admin


 


Atorvastatin Calcium  40 mg  01/10/19 10:00  





  Lipitor  PO   





  DAILY [facility]   





     





     





     





     


 


Calcitriol  0.5 mcg  01/10/19 10:00  





  Rocaltrol  PO   





  DAILY [facility]   





     





     





     





     


 


Calcium Acetate  667 mg  01/10/19 08:00  01/10/19 08:33





  Phoslo  PO   Not Given





  WM [facility]   





     





     





     





     


 


Dextrose  0 ml  01/10/19 00:51  





  Dextrose 50% Inj  IV   





  STAT PRN   





  Hypoglycemia Protocol   





     





  Protocol   





     


 


Heparin Sodium (Porcine)  1,700 units  01/10/19 10:30  01/10/19 10:16





  Heparin  ICA  01/10/19 10:31  1,700 units





  ONCE ONE   Administration





     





     





     





     


 


Nicardipine HCl  20 mg in 200 mls @ 50 mls/hr  01/10/19 00:46  01/10/19 09:18





  Cardene Iv Premix  IV   5 mg/hr





  .Q4H PRN   50 mls/hr





  TITRATE PER MD ORDER   Administration





     





  Protocol   





  5 MG/HR   


 


Azithromycin  500 mg in 250 mls @ 167 mls/hr  01/10/19 10:00  





  Zithromax 500mg In Ns  IVPB   





  DAILY [facility]   





     





     





  Protocol   





     


 


Dextrose  1,000 mls @ 0 mls/hr  01/10/19 00:51  





  Dextrose 5% In Water 1000 Ml  IV   





  .Q0M PRN   





  Hypoglycemia Protocol   





     





  Protocol   





  Per Protocol   


 


Insulin Human Lispro  0 units  01/10/19 07:30  01/10/19 08:28





  Humalog Low  SC   Not Given





  [facility]   





     





     





  Protocol   





     


 


Pantoprazole Sodium  40 mg  01/10/19 10:00  





  Protonix Inj  IVP   





  DAILY [facility]   





     





     





     





     


 


Vitamin D  1,200 intlu  01/10/19 10:00  





  Vitamin D 400 Intl Units Tab  PO   





  DAILY [facility]   





     





     





     





     














- Patient Studies


Lab Studies: 


                                   Lab Studies











  01/10/19 01/10/19 01/10/19 Range/Units





  08:37 06:30 05:59 


 


WBC    11.0  (4.5-11.0)  10^3/uL


 


RBC    2.55 L  (3.5-6.1)  10^6/uL


 


Hgb    7.4 L  (14.0-18.0)  g/dL


 


Hct    21.5 L  (42.0-52.0)  %


 


MCV    84.3  (80.0-105.0)  fl


 


MCH    29.0  (25.0-35.0)  pg


 


MCHC    34.4  (31.0-37.0)  g/dl


 


RDW    13.1  (11.5-14.5)  %


 


Plt Count    273  (120.0-450.0)  10^3/uL


 


MPV    10.4  (7.0-11.0)  fl


 


Gran %    80.9 H  (50.0-68.0)  %


 


Lymph % (Auto)    12.4 L  (22.0-35.0)  %


 


Mono % (Auto)    4.4  (1.0-6.0)  %


 


Eos % (Auto)    2.1  (1.5-5.0)  %


 


Baso % (Auto)    0.2  (0.0-3.0)  %


 


Gran #    8.91 H  (1.4-6.5)  


 


Lymph # (Auto)    1.4  (1.2-3.4)  


 


Mono # (Auto)    0.5  (0.1-0.6)  


 


Eos # (Auto)    0.2  (0.0-0.7)  


 


Baso # (Auto)    0.02  (0.0-2.0)  K/mm3


 


PT     (9.4-12.5)  SECONDS


 


INR     


 


APTT     (25.1-36.5)  Seconds


 


pCO2     (35-45)  mm/Hg


 


pO2     ()  mm/Hg


 


HCO3     (21-28)  mmol/L


 


ABG pH     (7.35-7.45)  


 


ABG Total CO2     (22-28)  mmol.L


 


ABG O2 Saturation     (95-98)  %


 


ABG Base Excess     (-2.0-3.0)  mmol/L


 


ABG Potassium     (3.6-5.2)  mmol/L


 


Glucose     ()  mg/dl


 


Lactate     (0.7-2.1)  mmol/L


 


FiO2     %


 


Sodium   139   (132-148)  mmol/L


 


Potassium   6.0 H*   (3.6-5.0)  mmol/L


 


Chloride   109 H   ()  mmol/L


 


Carbon Dioxide   16 L   (21-33)  mmol/L


 


Anion Gap   20   (10-20)  


 


BUN   112 H   (7-21)  mg/dL


 


Creatinine   17.7 H*   (0.8-1.5)  mg/dl


 


Est GFR ( Amer)   3   


 


Est GFR (Non-Af Amer)   3   


 


POC Glucose (mg/dL)     ()  mg/dL


 


Random Glucose   122 H   ()  mg/dL


 


Calcium   6.4 L*   (8.4-10.5)  mg/dL


 


Phosphorus  8.5 H    (2.5-4.5)  mg/dL


 


Magnesium  1.6 L    (1.7-2.2)  mg/dL


 


Total Bilirubin   0.4   (0.2-1.3)  mg/dL


 


AST   63 H D   (17-59)  U/L


 


ALT   90 H   (7-56)  U/L


 


Alkaline Phosphatase   253 H   ()  U/L


 


Lactate Dehydrogenase     (333-699)  U/L


 


Total Creatine Kinase     ()  U/L


 


CK-MB (CK-2)     (0.0-3.6)  ng/mL


 


CK-MB (CK-2) %     (2.5-3.0)  %


 


Troponin I     ng/mL


 


NT-Pro-B Natriuret Pep     (0-450)  pg/mL


 


Total Protein   6.9   (5.8-8.3)  g/dL


 


Albumin   3.4   (3.0-4.8)  g/dL


 


Globulin   3.5   gm/dL


 


Albumin/Globulin Ratio   1.0 L   (1.1-1.8)  


 


Arterial Blood Potassium     (3.6-5.2)  mmol/L


 


Blood Type     


 


Antibody Screen     


 


BBK History Checked     














  01/10/19 01/09/19 01/09/19 Range/Units





  00:30 23:37 23:30 


 


WBC     (4.5-11.0)  10^3/uL


 


RBC     (3.5-6.1)  10^6/uL


 


Hgb     (14.0-18.0)  g/dL


 


Hct     (42.0-52.0)  %


 


MCV     (80.0-105.0)  fl


 


MCH     (25.0-35.0)  pg


 


MCHC     (31.0-37.0)  g/dl


 


RDW     (11.5-14.5)  %


 


Plt Count     (120.0-450.0)  10^3/uL


 


MPV     (7.0-11.0)  fl


 


Gran %     (50.0-68.0)  %


 


Lymph % (Auto)     (22.0-35.0)  %


 


Mono % (Auto)     (1.0-6.0)  %


 


Eos % (Auto)     (1.5-5.0)  %


 


Baso % (Auto)     (0.0-3.0)  %


 


Gran #     (1.4-6.5)  


 


Lymph # (Auto)     (1.2-3.4)  


 


Mono # (Auto)     (0.1-0.6)  


 


Eos # (Auto)     (0.0-0.7)  


 


Baso # (Auto)     (0.0-2.0)  K/mm3


 


PT     (9.4-12.5)  SECONDS


 


INR     


 


APTT     (25.1-36.5)  Seconds


 


pCO2     (35-45)  mm/Hg


 


pO2     ()  mm/Hg


 


HCO3     (21-28)  mmol/L


 


ABG pH     (7.35-7.45)  


 


ABG Total CO2     (22-28)  mmol.L


 


ABG O2 Saturation     (95-98)  %


 


ABG Base Excess     (-2.0-3.0)  mmol/L


 


ABG Potassium     (3.6-5.2)  mmol/L


 


Glucose     ()  mg/dl


 


Lactate     (0.7-2.1)  mmol/L


 


FiO2     %


 


Sodium     (132-148)  mmol/L


 


Potassium     (3.6-5.0)  mmol/L


 


Chloride     ()  mmol/L


 


Carbon Dioxide     (21-33)  mmol/L


 


Anion Gap     (10-20)  


 


BUN     (7-21)  mg/dL


 


Creatinine     (0.8-1.5)  mg/dl


 


Est GFR (African Amer)     


 


Est GFR (Non-Af Amer)     


 


POC Glucose (mg/dL)  82  40 L   ()  mg/dL


 


Random Glucose     ()  mg/dL


 


Calcium     (8.4-10.5)  mg/dL


 


Phosphorus     (2.5-4.5)  mg/dL


 


Magnesium     (1.7-2.2)  mg/dL


 


Total Bilirubin     (0.2-1.3)  mg/dL


 


AST     (17-59)  U/L


 


ALT     (7-56)  U/L


 


Alkaline Phosphatase     ()  U/L


 


Lactate Dehydrogenase     (333-699)  U/L


 


Total Creatine Kinase     ()  U/L


 


CK-MB (CK-2)     (0.0-3.6)  ng/mL


 


CK-MB (CK-2) %     (2.5-3.0)  %


 


Troponin I     ng/mL


 


NT-Pro-B Natriuret Pep     (0-450)  pg/mL


 


Total Protein     (5.8-8.3)  g/dL


 


Albumin     (3.0-4.8)  g/dL


 


Globulin     gm/dL


 


Albumin/Globulin Ratio     (1.1-1.8)  


 


Arterial Blood Potassium     (3.6-5.2)  mmol/L


 


Blood Type    B POSITIVE  


 


Antibody Screen    Negative  


 


BBK History Checked    Patient has bt  














  19 Range/Units





  21:10 20:30 20:30 


 


WBC    10.5  D  (4.5-11.0)  10^3/uL


 


RBC    2.62 L  (3.5-6.1)  10^6/uL


 


Hgb    7.7 L  (14.0-18.0)  g/dL


 


Hct    22.3 L  (42.0-52.0)  %


 


MCV    85.1  (80.0-105.0)  fl


 


MCH    29.4  (25.0-35.0)  pg


 


MCHC    34.5  (31.0-37.0)  g/dl


 


RDW    13.1  (11.5-14.5)  %


 


Plt Count    260  (120.0-450.0)  10^3/uL


 


MPV    10.4  (7.0-11.0)  fl


 


Gran %    79.2 H  (50.0-68.0)  %


 


Lymph % (Auto)    13.0 L  (22.0-35.0)  %


 


Mono % (Auto)    5.2  (1.0-6.0)  %


 


Eos % (Auto)    2.4  (1.5-5.0)  %


 


Baso % (Auto)    0.2  (0.0-3.0)  %


 


Gran #    8.31 H  (1.4-6.5)  


 


Lymph # (Auto)    1.4  (1.2-3.4)  


 


Mono # (Auto)    0.5  (0.1-0.6)  


 


Eos # (Auto)    0.3  (0.0-0.7)  


 


Baso # (Auto)    0.02  (0.0-2.0)  K/mm3


 


PT   12.5   (9.4-12.5)  SECONDS


 


INR   1.09   


 


APTT   30.8   (25.1-36.5)  Seconds


 


pCO2  25 L    (35-45)  mm/Hg


 


pO2  59.0 L    ()  mm/Hg


 


HCO3  13.8 L    (21-28)  mmol/L


 


ABG pH  7.35    (7.35-7.45)  


 


ABG Total CO2  14.6 L    (22-28)  mmol.L


 


ABG O2 Saturation  93.9 L    (95-98)  %


 


ABG Base Excess  -10.0 L    (-2.0-3.0)  mmol/L


 


ABG Potassium  6.5 H*    (3.6-5.2)  mmol/L


 


Glucose  124 H    ()  mg/dl


 


Lactate  1.0    (0.7-2.1)  mmol/L


 


FiO2  21.0    %


 


Sodium  136.0    (132-148)  mmol/L


 


Potassium     (3.6-5.0)  mmol/L


 


Chloride  108.0 H    ()  mmol/L


 


Carbon Dioxide     (21-33)  mmol/L


 


Anion Gap     (10-20)  


 


BUN     (7-21)  mg/dL


 


Creatinine     (0.8-1.5)  mg/dl


 


Est GFR (African Amer)     


 


Est GFR (Non-Af Amer)     


 


POC Glucose (mg/dL)     ()  mg/dL


 


Random Glucose     ()  mg/dL


 


Calcium     (8.4-10.5)  mg/dL


 


Phosphorus     (2.5-4.5)  mg/dL


 


Magnesium     (1.7-2.2)  mg/dL


 


Total Bilirubin     (0.2-1.3)  mg/dL


 


AST     (17-59)  U/L


 


ALT     (7-56)  U/L


 


Alkaline Phosphatase     ()  U/L


 


Lactate Dehydrogenase     (333-699)  U/L


 


Total Creatine Kinase     ()  U/L


 


CK-MB (CK-2)     (0.0-3.6)  ng/mL


 


CK-MB (CK-2) %     (2.5-3.0)  %


 


Troponin I     ng/mL


 


NT-Pro-B Natriuret Pep     (0-450)  pg/mL


 


Total Protein     (5.8-8.3)  g/dL


 


Albumin     (3.0-4.8)  g/dL


 


Globulin     gm/dL


 


Albumin/Globulin Ratio     (1.1-1.8)  


 


Arterial Blood Potassium  6.5 H*    (3.6-5.2)  mmol/L


 


Blood Type     


 


Antibody Screen     


 


BBK History Checked     














  19 Range/Units





  20:30 


 


WBC   (4.5-11.0)  10^3/uL


 


RBC   (3.5-6.1)  10^6/uL


 


Hgb   (14.0-18.0)  g/dL


 


Hct   (42.0-52.0)  %


 


MCV   (80.0-105.0)  fl


 


MCH   (25.0-35.0)  pg


 


MCHC   (31.0-37.0)  g/dl


 


RDW   (11.5-14.5)  %


 


Plt Count   (120.0-450.0)  10^3/uL


 


MPV   (7.0-11.0)  fl


 


Gran %   (50.0-68.0)  %


 


Lymph % (Auto)   (22.0-35.0)  %


 


Mono % (Auto)   (1.0-6.0)  %


 


Eos % (Auto)   (1.5-5.0)  %


 


Baso % (Auto)   (0.0-3.0)  %


 


Gran #   (1.4-6.5)  


 


Lymph # (Auto)   (1.2-3.4)  


 


Mono # (Auto)   (0.1-0.6)  


 


Eos # (Auto)   (0.0-0.7)  


 


Baso # (Auto)   (0.0-2.0)  K/mm3


 


PT   (9.4-12.5)  SECONDS


 


INR   


 


APTT   (25.1-36.5)  Seconds


 


pCO2   (35-45)  mm/Hg


 


pO2   ()  mm/Hg


 


HCO3   (21-28)  mmol/L


 


ABG pH   (7.35-7.45)  


 


ABG Total CO2   (22-28)  mmol.L


 


ABG O2 Saturation   (95-98)  %


 


ABG Base Excess   (-2.0-3.0)  mmol/L


 


ABG Potassium   (3.6-5.2)  mmol/L


 


Glucose   ()  mg/dl


 


Lactate   (0.7-2.1)  mmol/L


 


FiO2   %


 


Sodium  139  (132-148)  mmol/L


 


Potassium  6.6 H* D  (3.6-5.0)  mmol/L


 


Chloride  109 H  ()  mmol/L


 


Carbon Dioxide  17 L  (21-33)  mmol/L


 


Anion Gap  19  (10-20)  


 


BUN  117 H  (7-21)  mg/dL


 


Creatinine  16.7 H* D  (0.8-1.5)  mg/dl


 


Est GFR (African Amer)  4  


 


Est GFR (Non-Af Amer)  3  


 


POC Glucose (mg/dL)   ()  mg/dL


 


Random Glucose  110  ()  mg/dL


 


Calcium  6.2 L*  (8.4-10.5)  mg/dL


 


Phosphorus   (2.5-4.5)  mg/dL


 


Magnesium   (1.7-2.2)  mg/dL


 


Total Bilirubin  0.6  (0.2-1.3)  mg/dL


 


AST  39  (17-59)  U/L


 


ALT  87 H  (7-56)  U/L


 


Alkaline Phosphatase  256 H D  ()  U/L


 


Lactate Dehydrogenase  1531 H  (333-699)  U/L


 


Total Creatine Kinase  6783 H  ()  U/L


 


CK-MB (CK-2)  16.0 H  (0.0-3.6)  ng/mL


 


CK-MB (CK-2) %  0.2 L  (2.5-3.0)  %


 


Troponin I  0.01  D  ng/mL


 


NT-Pro-B Natriuret Pep  97143 H  (0-450)  pg/mL


 


Total Protein  7.2  (5.8-8.3)  g/dL


 


Albumin  3.5  (3.0-4.8)  g/dL


 


Globulin  3.7  gm/dL


 


Albumin/Globulin Ratio  0.9 L  (1.1-1.8)  


 


Arterial Blood Potassium   (3.6-5.2)  mmol/L


 


Blood Type   


 


Antibody Screen   


 


BBK History Checked   








                         Laboratory Results - last 24 hr











  19





  20:30 20:30 20:30


 


WBC   10.5  D 


 


RBC   2.62 L 


 


Hgb   7.7 L 


 


Hct   22.3 L 


 


MCV   85.1 


 


MCH   29.4 


 


MCHC   34.5 


 


RDW   13.1 


 


Plt Count   260 


 


MPV   10.4 


 


Gran %   79.2 H 


 


Lymph % (Auto)   13.0 L 


 


Mono % (Auto)   5.2 


 


Eos % (Auto)   2.4 


 


Baso % (Auto)   0.2 


 


Gran #   8.31 H 


 


Lymph # (Auto)   1.4 


 


Mono # (Auto)   0.5 


 


Eos # (Auto)   0.3 


 


Baso # (Auto)   0.02 


 


PT    12.5


 


INR    1.09


 


APTT    30.8


 


pCO2   


 


pO2   


 


HCO3   


 


ABG pH   


 


ABG Total CO2   


 


ABG O2 Saturation   


 


ABG Base Excess   


 


ABG Potassium   


 


Glucose   


 


Lactate   


 


FiO2   


 


Sodium  139  


 


Potassium  6.6 H* D  


 


Chloride  109 H  


 


Carbon Dioxide  17 L  


 


Anion Gap  19  


 


BUN  117 H  


 


Creatinine  16.7 H* D  


 


Est GFR ( Amer)  4  


 


Est GFR (Non-Af Amer)  3  


 


POC Glucose (mg/dL)   


 


Random Glucose  110  


 


Calcium  6.2 L*  


 


Phosphorus   


 


Magnesium   


 


Total Bilirubin  0.6  


 


AST  39  


 


ALT  87 H  


 


Alkaline Phosphatase  256 H D  


 


Lactate Dehydrogenase  1531 H  


 


Total Creatine Kinase  6783 H  


 


CK-MB (CK-2)  16.0 H  


 


CK-MB (CK-2) %  0.2 L  


 


Troponin I  0.01  D  


 


NT-Pro-B Natriuret Pep  54807 H  


 


Total Protein  7.2  


 


Albumin  3.5  


 


Globulin  3.7  


 


Albumin/Globulin Ratio  0.9 L  


 


Arterial Blood Potassium   


 


Blood Type   


 


Antibody Screen   


 


BBK History Checked   














  19





  21:10 23:30 23:37


 


WBC   


 


RBC   


 


Hgb   


 


Hct   


 


MCV   


 


MCH   


 


MCHC   


 


RDW   


 


Plt Count   


 


MPV   


 


Gran %   


 


Lymph % (Auto)   


 


Mono % (Auto)   


 


Eos % (Auto)   


 


Baso % (Auto)   


 


Gran #   


 


Lymph # (Auto)   


 


Mono # (Auto)   


 


Eos # (Auto)   


 


Baso # (Auto)   


 


PT   


 


INR   


 


APTT   


 


pCO2  25 L  


 


pO2  59.0 L  


 


HCO3  13.8 L  


 


ABG pH  7.35  


 


ABG Total CO2  14.6 L  


 


ABG O2 Saturation  93.9 L  


 


ABG Base Excess  -10.0 L  


 


ABG Potassium  6.5 H*  


 


Glucose  124 H  


 


Lactate  1.0  


 


FiO2  21.0  


 


Sodium  136.0  


 


Potassium   


 


Chloride  108.0 H  


 


Carbon Dioxide   


 


Anion Gap   


 


BUN   


 


Creatinine   


 


Est GFR ( Amer)   


 


Est GFR (Non-Af Amer)   


 


POC Glucose (mg/dL)    40 L


 


Random Glucose   


 


Calcium   


 


Phosphorus   


 


Magnesium   


 


Total Bilirubin   


 


AST   


 


ALT   


 


Alkaline Phosphatase   


 


Lactate Dehydrogenase   


 


Total Creatine Kinase   


 


CK-MB (CK-2)   


 


CK-MB (CK-2) %   


 


Troponin I   


 


NT-Pro-B Natriuret Pep   


 


Total Protein   


 


Albumin   


 


Globulin   


 


Albumin/Globulin Ratio   


 


Arterial Blood Potassium  6.5 H*  


 


Blood Type   B POSITIVE 


 


Antibody Screen   Negative 


 


BBK History Checked   Patient has bt 














  01/10/19 01/10/19 01/10/19





  00:30 05:59 06:30


 


WBC   11.0 


 


RBC   2.55 L 


 


Hgb   7.4 L 


 


Hct   21.5 L 


 


MCV   84.3 


 


MCH   29.0 


 


MCHC   34.4 


 


RDW   13.1 


 


Plt Count   273 


 


MPV   10.4 


 


Gran %   80.9 H 


 


Lymph % (Auto)   12.4 L 


 


Mono % (Auto)   4.4 


 


Eos % (Auto)   2.1 


 


Baso % (Auto)   0.2 


 


Gran #   8.91 H 


 


Lymph # (Auto)   1.4 


 


Mono # (Auto)   0.5 


 


Eos # (Auto)   0.2 


 


Baso # (Auto)   0.02 


 


PT   


 


INR   


 


APTT   


 


pCO2   


 


pO2   


 


HCO3   


 


ABG pH   


 


ABG Total CO2   


 


ABG O2 Saturation   


 


ABG Base Excess   


 


ABG Potassium   


 


Glucose   


 


Lactate   


 


FiO2   


 


Sodium    139


 


Potassium    6.0 H*


 


Chloride    109 H


 


Carbon Dioxide    16 L


 


Anion Gap    20


 


BUN    112 H


 


Creatinine    17.7 H*


 


Est GFR ( Amer)    3


 


Est GFR (Non-Af Amer)    3


 


POC Glucose (mg/dL)  82  


 


Random Glucose    122 H


 


Calcium    6.4 L*


 


Phosphorus   


 


Magnesium   


 


Total Bilirubin    0.4


 


AST    63 H D


 


ALT    90 H


 


Alkaline Phosphatase    253 H


 


Lactate Dehydrogenase   


 


Total Creatine Kinase   


 


CK-MB (CK-2)   


 


CK-MB (CK-2) %   


 


Troponin I   


 


NT-Pro-B Natriuret Pep   


 


Total Protein    6.9


 


Albumin    3.4


 


Globulin    3.5


 


Albumin/Globulin Ratio    1.0 L


 


Arterial Blood Potassium   


 


Blood Type   


 


Antibody Screen   


 


BBK History Checked   














  01/10/19





  08:37


 


WBC 


 


RBC 


 


Hgb 


 


Hct 


 


MCV 


 


MCH 


 


MCHC 


 


RDW 


 


Plt Count 


 


MPV 


 


Gran % 


 


Lymph % (Auto) 


 


Mono % (Auto) 


 


Eos % (Auto) 


 


Baso % (Auto) 


 


Gran # 


 


Lymph # (Auto) 


 


Mono # (Auto) 


 


Eos # (Auto) 


 


Baso # (Auto) 


 


PT 


 


INR 


 


APTT 


 


pCO2 


 


pO2 


 


HCO3 


 


ABG pH 


 


ABG Total CO2 


 


ABG O2 Saturation 


 


ABG Base Excess 


 


ABG Potassium 


 


Glucose 


 


Lactate 


 


FiO2 


 


Sodium 


 


Potassium 


 


Chloride 


 


Carbon Dioxide 


 


Anion Gap 


 


BUN 


 


Creatinine 


 


Est GFR ( Amer) 


 


Est GFR (Non-Af Amer) 


 


POC Glucose (mg/dL) 


 


Random Glucose 


 


Calcium 


 


Phosphorus  8.5 H


 


Magnesium  1.6 L


 


Total Bilirubin 


 


AST 


 


ALT 


 


Alkaline Phosphatase 


 


Lactate Dehydrogenase 


 


Total Creatine Kinase 


 


CK-MB (CK-2) 


 


CK-MB (CK-2) % 


 


Troponin I 


 


NT-Pro-B Natriuret Pep 


 


Total Protein 


 


Albumin 


 


Globulin 


 


Albumin/Globulin Ratio 


 


Arterial Blood Potassium 


 


Blood Type 


 


Antibody Screen 


 


BBK History Checked 











Radiology Impressions: 


                              Radiology Impressions





Chest X-Ray  19 20:43


IMPRESSION:


Trace left pleural effusion.  No infiltrate bilaterally.  Tunneled 


central venous dialysis catheter in situ right chest.  


 


 











EKG/Cardiology Studies: 


Cardiology / EKG Studies





19 20:46


EKG [ELECTROCARDIOGRAM] Stat 


   Comment: 


   Reason For Exam: SOB











Fingerstick Blood Sugar Results: 221





Review of Systems





- Review of Systems


All systems: reviewed and no additional remarkable complaints except (as 

mentioned in HPI)





Critical Care Progress Note





- Nutrition


Nutrition: 


                                    Nutrition











 Category Date Time Status


 


 Renal Diet [DIET] Diets  01/10/19 Breakfast Ordered














Assessment/Plan





- Assessment and Plan (Free Text)


Assessment: 





53 year old male with past medical history of HTN, DM2, unspecified renal mass, 

hepatic hemangioma CKD on HD originally scheduled with  with poor 

compliance who presents to [facility] [place] ED for fatigue, SHOB, fluid accumulation, 

productive cough found to be be volume overloaded secondary to poor compliance 

with HD, medication, and diet. 


Plan: 





Neuro: 


AAOx3, mentation intact 


GCS15


monitor mentation 





Pulm:


-Maintain SaO2>92%


-Supplemental O2 as needed with NC


-AB.35/25/59/13.8 on FiO2 21


-CXR:Trace left pleural effusion, tunneled CV dialysis catheter on right side


-Elevated bed 30 degrees





Cardio: 


HTN


-Avoid ACE,ARB


-Nicardipine gtt initially for elevated BP


-Plan to transition to oral


-Maintain MAP >65


-EKG on admission: Tachycardia 92bpm, NSR, slightly peaked T waves


[place] [address](baseline ~9000)


-Likely Volume overload status secondary to poor compliance with HD


-HD today


-Echo 18): Ef 52%, mild to mod concentric LVH, moderate to severe MR





GI: 


Elevated ALT, ALP


Hepatic Hemangioma


Nausea, Vomiting


-Liver CT(18) with evidence of solitary mass in the right hepatic lobe 

consistent with hemangioma


-GI consulted, f/u recs


-Likely secondary to uremia in the setting of non compliance with HD


-GI ppx with protonix 


-Renal Diet 





Renal: 


CKD stage V on HD /


Electrolyte abnormalities, Hyperkalemia, Hypyercalcemia


-AB.35/25/59/13.8 on FiO2 21


-Poor compliance with HD


-HD today with removal of 3 Liters planned today, check CBC, CMP s/p HD


-Maintaine euvolemia, repletion of lytes 


-Renal consulted, f/u recs





Endo: 


DM2


-Maintain euglycemia


-ISS low


-ACHS





Heme/Onc: 


Normocytic Anemia


-Likely anemia of chronic disease


-Baseline H/H closer to 7-8 Hgb


-Monitor CBC





ID: 


Afebrile, no leukocytosis


-f/u urine, blood, sputum culture





MSK:


Rhabdomyolysis


-elevated CK


-HD today, f/u CK








Patient seen, case and plan discussed with attending Dr. Dennis





<Dennis,Bilal - Last Filed: 01/10/19 17:38>





CCU Objective





- Vital Signs / Intake & Output


Vital Signs (Last 4 hours): 


Vital Signs











  Pulse Resp BP Pulse Ox


 


 01/10/19 17:19  85   175/92 H 


 


 01/10/19 14:00  88  24  141/65  90 L


 


 01/10/19 13:43    124/67  90 L











Intake and Output (Last 8hrs): 


                                 Intake & Output











 01/10/19 01/10/19 01/10/19





 06:59 14:59 22:59


 


Intake Total 380 200 


 


Output Total 200  


 


Balance 180 200 


 


Weight 88.451 kg  


 


Intake:   


 


   200 


 


    Right Antecubital 200  


 


  Oral 180  


 


Output:   


 


  Urine 200  


 


    Urine, Voided 200  


 


Other:   


 


  # Voids   


 


    Urine, Voided 1  














- Medications


Active Medications: 


Active Medications











Generic Name Dose Route Start Last Admin





  Trade Name Freq  PRN Reason Stop Dose Admin


 


Amlodipine Besylate  10 mg  01/10/19 11:15  01/10/19 11:42





  Norvasc  PO   10 mg





  DAILY LELO   Administration





     





     





     





     


 


Atorvastatin Calcium  40 mg  01/10/19 22:00  





  Lipitor  PO   





  HS LELO   





     





     





     





     


 


Calcium Acetate  1,334 mg  01/10/19 11:16  01/10/19 16:38





  Phoslo  PO   1,334 mg





  WM LELO   Administration





     





     





     





     


 


Darbepoetin Iglesia  100 mcg  19 10:00  





  Aranesp  IVP   





  QWK LELO   





     





     





     





     


 


Dextrose  0 ml  01/10/19 00:51  





  Dextrose 50% Inj  IV   





  STAT PRN   





  Hypoglycemia Protocol   





     





  Protocol   





     


 


Ergocalciferol  1 cap  01/10/19 11:15  01/10/19 11:41





  Drisdol 50,000 Intl Units Cap  PO   1 cap





  Q7D LELO   Administration





     





     





     





     


 


Heparin Sodium (Porcine)  2,000 units  19 10:00  





  Heparin  IVP   





  TTS LELO   





     





     





  Protocol   





     


 


Azithromycin  500 mg in 250 mls @ 167 mls/hr  01/10/19 10:00  01/10/19 11:50





  Zithromax 500mg In Ns  IVPB   167 mls/hr





  DAILY LELO   Administration





     





     





  Protocol   





     


 


Dextrose  1,000 mls @ 0 mls/hr  01/10/19 00:51  





  Dextrose 5% In Water 1000 Ml  IV   





  .Q0M PRN   





  Hypoglycemia Protocol   





     





  Protocol   





  Per Protocol   


 


Iron Sucrose 100 mg/ Sodium  105 mls @ 210 mls/hr  01/10/19 11:15  01/10/19 

14:40





  Chloride  IVPB  19 11:16  210 mls/hr





  DAILY LELO   Administration





     





     





     





     


 


Insulin Human Lispro  0 units  01/10/19 07:30  01/10/19 16:36





  Humalog Low  SC   Not Given





  [facility]   





     





     





  Protocol   





     


 


Labetalol HCl  200 mg  01/10/19 11:15  01/10/19 17:19





  Trandate  PO   200 mg





  BID LELO   Administration





     





     





     





     


 


Pantoprazole Sodium  40 mg  01/10/19 10:00  01/10/19 11:43





  Protonix Inj  IVP   40 mg





  DAILY LELO   Administration





     





     





     





     


 


Vitamin B Complex/Vit C/Folic Acid  1 tab  19 08:00  





  Nephro-Dave  PO   





  [address] [facility]   





     





     





     





     














- Patient Studies


Lab Studies: 


                                   Lab Studies











  01/10/19 01/10/19 01/10/19 Range/Units





  13:43 13:00 11:10 


 


WBC     (4.5-11.0)  10^3/uL


 


RBC     (3.5-6.1)  10^6/uL


 


Hgb     (14.0-18.0)  g/dL


 


Hct     (42.0-52.0)  %


 


MCV     (80.0-105.0)  fl


 


MCH     (25.0-35.0)  pg


 


MCHC     (31.0-37.0)  g/dl


 


RDW     (11.5-14.5)  %


 


Plt Count     (120.0-450.0)  10^3/uL


 


MPV     (7.0-11.0)  fl


 


Gran %     (50.0-68.0)  %


 


Lymph % (Auto)     (22.0-35.0)  %


 


Mono % (Auto)     (1.0-6.0)  %


 


Eos % (Auto)     (1.5-5.0)  %


 


Baso % (Auto)     (0.0-3.0)  %


 


Gran #     (1.4-6.5)  


 


Lymph # (Auto)     (1.2-3.4)  


 


Mono # (Auto)     (0.1-0.6)  


 


Eos # (Auto)     (0.0-0.7)  


 


Baso # (Auto)     (0.0-2.0)  K/mm3


 


PT     (9.4-12.5)  SECONDS


 


INR     


 


APTT     (25.1-36.5)  Seconds


 


pCO2     (35-45)  mm/Hg


 


pO2     ()  mm/Hg


 


HCO3     (21-28)  mmol/L


 


ABG pH     (7.35-7.45)  


 


ABG Total CO2     (22-28)  mmol.L


 


ABG O2 Saturation     (95-98)  %


 


ABG Base Excess     (-2.0-3.0)  mmol/L


 


ABG Potassium     (3.6-5.2)  mmol/L


 


Glucose     ()  mg/dl


 


Lactate     (0.7-2.1)  mmol/L


 


FiO2     %


 


Sodium   135   (132-148)  mmol/L


 


Potassium   3.8   (3.6-5.0)  mmol/L


 


Chloride   100   ()  mmol/L


 


Carbon Dioxide   25   (21-33)  mmol/L


 


Anion Gap   14   (10-20)  


 


BUN   53 H   (7-21)  mg/dL


 


Creatinine   9.6 H* D   (0.8-1.5)  mg/dl


 


Est GFR ( Amer)   7   


 


Est GFR (Non-Af Amer)   6   


 


POC Glucose (mg/dL)  211 H   137 H  ()  mg/dL


 


Random Glucose   185 H   ()  mg/dL


 


Calcium   7.1 L   (8.4-10.5)  mg/dL


 


Phosphorus     (2.5-4.5)  mg/dL


 


Magnesium     (1.7-2.2)  mg/dL


 


Total Bilirubin     (0.2-1.3)  mg/dL


 


AST     (17-59)  U/L


 


ALT     (7-56)  U/L


 


Alkaline Phosphatase     ()  U/L


 


Lactate Dehydrogenase     (333-699)  U/L


 


Total Creatine Kinase     ()  U/L


 


CK-MB (CK-2)     (0.0-3.6)  ng/mL


 


CK-MB (CK-2) %     (2.5-3.0)  %


 


Troponin I     ng/mL


 


NT-Pro-B Natriuret Pep     (0-450)  pg/mL


 


Total Protein     (5.8-8.3)  g/dL


 


Albumin     (3.0-4.8)  g/dL


 


Globulin     gm/dL


 


Albumin/Globulin Ratio     (1.1-1.8)  


 


Procalcitonin     (0.19-0.49)  NG/ML


 


Arterial Blood Potassium     (3.6-5.2)  mmol/L


 


Hep Bs Antigen     (NEGATIVE)  


 


Hep Bs Antibody     (NEGATIVE)  


 


Hep B Core IgM Ab     (NEGATIVE)  


 


Blood Type     


 


Antibody Screen     


 


BBK History Checked     














  01/10/19 01/10/19 01/10/19 Range/Units





  10:20 10:20 08:37 


 


WBC     (4.5-11.0)  10^3/uL


 


RBC     (3.5-6.1)  10^6/uL


 


Hgb     (14.0-18.0)  g/dL


 


Hct     (42.0-52.0)  %


 


MCV     (80.0-105.0)  fl


 


MCH     (25.0-35.0)  pg


 


MCHC     (31.0-37.0)  g/dl


 


RDW     (11.5-14.5)  %


 


Plt Count     (120.0-450.0)  10^3/uL


 


MPV     (7.0-11.0)  fl


 


Gran %     (50.0-68.0)  %


 


Lymph % (Auto)     (22.0-35.0)  %


 


Mono % (Auto)     (1.0-6.0)  %


 


Eos % (Auto)     (1.5-5.0)  %


 


Baso % (Auto)     (0.0-3.0)  %


 


Gran #     (1.4-6.5)  


 


Lymph # (Auto)     (1.2-3.4)  


 


Mono # (Auto)     (0.1-0.6)  


 


Eos # (Auto)     (0.0-0.7)  


 


Baso # (Auto)     (0.0-2.0)  K/mm3


 


PT     (9.4-12.5)  SECONDS


 


INR     


 


APTT     (25.1-36.5)  Seconds


 


pCO2     (35-45)  mm/Hg


 


pO2     ()  mm/Hg


 


HCO3     (21-28)  mmol/L


 


ABG pH     (7.35-7.45)  


 


ABG Total CO2     (22-28)  mmol.L


 


ABG O2 Saturation     (95-98)  %


 


ABG Base Excess     (-2.0-3.0)  mmol/L


 


ABG Potassium     (3.6-5.2)  mmol/L


 


Glucose     ()  mg/dl


 


Lactate     (0.7-2.1)  mmol/L


 


FiO2     %


 


Sodium     (132-148)  mmol/L


 


Potassium     (3.6-5.0)  mmol/L


 


Chloride     ()  mmol/L


 


Carbon Dioxide     (21-33)  mmol/L


 


Anion Gap     (10-20)  


 


BUN     (7-21)  mg/dL


 


Creatinine     (0.8-1.5)  mg/dl


 


Est GFR (African Amer)     


 


Est GFR (Non-Af Amer)     


 


POC Glucose (mg/dL)     ()  mg/dL


 


Random Glucose     ()  mg/dL


 


Calcium     (8.4-10.5)  mg/dL


 


Phosphorus    8.5 H  (2.5-4.5)  mg/dL


 


Magnesium    1.6 L  (1.7-2.2)  mg/dL


 


Total Bilirubin     (0.2-1.3)  mg/dL


 


AST     (17-59)  U/L


 


ALT     (7-56)  U/L


 


Alkaline Phosphatase     ()  U/L


 


Lactate Dehydrogenase     (333-699)  U/L


 


Total Creatine Kinase   5698 H   ()  U/L


 


CK-MB (CK-2)   13.2 H   (0.0-3.6)  ng/mL


 


CK-MB (CK-2) %   0.2 L   (2.5-3.0)  %


 


Troponin I  0.03  D    ng/mL


 


NT-Pro-B Natriuret Pep     (0-450)  pg/mL


 


Total Protein     (5.8-8.3)  g/dL


 


Albumin     (3.0-4.8)  g/dL


 


Globulin     gm/dL


 


Albumin/Globulin Ratio     (1.1-1.8)  


 


Procalcitonin     (0.19-0.49)  NG/ML


 


Arterial Blood Potassium     (3.6-5.2)  mmol/L


 


Hep Bs Antigen     (NEGATIVE)  


 


Hep Bs Antibody     (NEGATIVE)  


 


Hep B Core IgM Ab     (NEGATIVE)  


 


Blood Type     


 


Antibody Screen     


 


BBK History Checked     














  01/10/19 01/10/19 01/10/19 Range/Units





  08:30 08:30 07:38 


 


WBC     (4.5-11.0)  10^3/uL


 


RBC     (3.5-6.1)  10^6/uL


 


Hgb     (14.0-18.0)  g/dL


 


Hct     (42.0-52.0)  %


 


MCV     (80.0-105.0)  fl


 


MCH     (25.0-35.0)  pg


 


MCHC     (31.0-37.0)  g/dl


 


RDW     (11.5-14.5)  %


 


Plt Count     (120.0-450.0)  10^3/uL


 


MPV     (7.0-11.0)  fl


 


Gran %     (50.0-68.0)  %


 


Lymph % (Auto)     (22.0-35.0)  %


 


Mono % (Auto)     (1.0-6.0)  %


 


Eos % (Auto)     (1.5-5.0)  %


 


Baso % (Auto)     (0.0-3.0)  %


 


Gran #     (1.4-6.5)  


 


Lymph # (Auto)     (1.2-3.4)  


 


Mono # (Auto)     (0.1-0.6)  


 


Eos # (Auto)     (0.0-0.7)  


 


Baso # (Auto)     (0.0-2.0)  K/mm3


 


PT     (9.4-12.5)  SECONDS


 


INR     


 


APTT     (25.1-36.5)  Seconds


 


pCO2     (35-45)  mm/Hg


 


pO2     ()  mm/Hg


 


HCO3     (21-28)  mmol/L


 


ABG pH     (7.35-7.45)  


 


ABG Total CO2     (22-28)  mmol.L


 


ABG O2 Saturation     (95-98)  %


 


ABG Base Excess     (-2.0-3.0)  mmol/L


 


ABG Potassium     (3.6-5.2)  mmol/L


 


Glucose     ()  mg/dl


 


Lactate     (0.7-2.1)  mmol/L


 


FiO2     %


 


Sodium     (132-148)  mmol/L


 


Potassium     (3.6-5.0)  mmol/L


 


Chloride     ()  mmol/L


 


Carbon Dioxide     (21-33)  mmol/L


 


Anion Gap     (10-20)  


 


BUN     (7-21)  mg/dL


 


Creatinine     (0.8-1.5)  mg/dl


 


Est GFR (African Amer)     


 


Est GFR (Non-Af Amer)     


 


POC Glucose (mg/dL)    221 H  ()  mg/dL


 


Random Glucose     ()  mg/dL


 


Calcium     (8.4-10.5)  mg/dL


 


Phosphorus     (2.5-4.5)  mg/dL


 


Magnesium     (1.7-2.2)  mg/dL


 


Total Bilirubin     (0.2-1.3)  mg/dL


 


AST     (17-59)  U/L


 


ALT     (7-56)  U/L


 


Alkaline Phosphatase     ()  U/L


 


Lactate Dehydrogenase     (333-699)  U/L


 


Total Creatine Kinase     ()  U/L


 


CK-MB (CK-2)     (0.0-3.6)  ng/mL


 


CK-MB (CK-2) %     (2.5-3.0)  %


 


Troponin I     ng/mL


 


NT-Pro-B Natriuret Pep     (0-450)  pg/mL


 


Total Protein     (5.8-8.3)  g/dL


 


Albumin     (3.0-4.8)  g/dL


 


Globulin     gm/dL


 


Albumin/Globulin Ratio     (1.1-1.8)  


 


Procalcitonin     (0.19-0.49)  NG/ML


 


Arterial Blood Potassium     (3.6-5.2)  mmol/L


 


Hep Bs Antigen   Negative   (NEGATIVE)  


 


Hep Bs Antibody  Negative    (NEGATIVE)  


 


Hep B Core IgM Ab   Negative   (NEGATIVE)  


 


Blood Type     


 


Antibody Screen     


 


BBK History Checked     














  01/10/19 01/10/19 01/10/19 Range/Units





  06:30 05:59 05:20 


 


WBC   11.0   (4.5-11.0)  10^3/uL


 


RBC   2.55 L   (3.5-6.1)  10^6/uL


 


Hgb   7.4 L   (14.0-18.0)  g/dL


 


Hct   21.5 L   (42.0-52.0)  %


 


MCV   84.3   (80.0-105.0)  fl


 


MCH   29.0   (25.0-35.0)  pg


 


MCHC   34.4   (31.0-37.0)  g/dl


 


RDW   13.1   (11.5-14.5)  %


 


Plt Count   273   (120.0-450.0)  10^3/uL


 


MPV   10.4   (7.0-11.0)  fl


 


Gran %   80.9 H   (50.0-68.0)  %


 


Lymph % (Auto)   12.4 L   (22.0-35.0)  %


 


Mono % (Auto)   4.4   (1.0-6.0)  %


 


Eos % (Auto)   2.1   (1.5-5.0)  %


 


Baso % (Auto)   0.2   (0.0-3.0)  %


 


Gran #   8.91 H   (1.4-6.5)  


 


Lymph # (Auto)   1.4   (1.2-3.4)  


 


Mono # (Auto)   0.5   (0.1-0.6)  


 


Eos # (Auto)   0.2   (0.0-0.7)  


 


Baso # (Auto)   0.02   (0.0-2.0)  K/mm3


 


PT     (9.4-12.5)  SECONDS


 


INR     


 


APTT     (25.1-36.5)  Seconds


 


pCO2     (35-45)  mm/Hg


 


pO2     ()  mm/Hg


 


HCO3     (21-28)  mmol/L


 


ABG pH     (7.35-7.45)  


 


ABG Total CO2     (22-28)  mmol.L


 


ABG O2 Saturation     (95-98)  %


 


ABG Base Excess     (-2.0-3.0)  mmol/L


 


ABG Potassium     (3.6-5.2)  mmol/L


 


Glucose     ()  mg/dl


 


Lactate     (0.7-2.1)  mmol/L


 


FiO2     %


 


Sodium  139    (132-148)  mmol/L


 


Potassium  6.0 H*    (3.6-5.0)  mmol/L


 


Chloride  109 H    ()  mmol/L


 


Carbon Dioxide  16 L    (21-33)  mmol/L


 


Anion Gap  20    (10-20)  


 


BUN  112 H    (7-21)  mg/dL


 


Creatinine  17.7 H*    (0.8-1.5)  mg/dl


 


Est GFR ( Amer)  3    


 


Est GFR (Non-Af Amer)  3    


 


POC Glucose (mg/dL)     ()  mg/dL


 


Random Glucose  122 H    ()  mg/dL


 


Calcium  6.4 L*    (8.4-10.5)  mg/dL


 


Phosphorus     (2.5-4.5)  mg/dL


 


Magnesium     (1.7-2.2)  mg/dL


 


Total Bilirubin  0.4    (0.2-1.3)  mg/dL


 


AST  63 H D    (17-59)  U/L


 


ALT  90 H    (7-56)  U/L


 


Alkaline Phosphatase  253 H    ()  U/L


 


Lactate Dehydrogenase     (333-699)  U/L


 


Total Creatine Kinase     ()  U/L


 


CK-MB (CK-2)     (0.0-3.6)  ng/mL


 


CK-MB (CK-2) %     (2.5-3.0)  %


 


Troponin I     ng/mL


 


NT-Pro-B Natriuret Pep     (0-450)  pg/mL


 


Total Protein  6.9    (5.8-8.3)  g/dL


 


Albumin  3.4    (3.0-4.8)  g/dL


 


Globulin  3.5    gm/dL


 


Albumin/Globulin Ratio  1.0 L    (1.1-1.8)  


 


Procalcitonin    0.24  (0.19-0.49)  NG/ML


 


Arterial Blood Potassium     (3.6-5.2)  mmol/L


 


Hep Bs Antigen     (NEGATIVE)  


 


Hep Bs Antibody     (NEGATIVE)  


 


Hep B Core IgM Ab     (NEGATIVE)  


 


Blood Type     


 


Antibody Screen     


 


BBK History Checked     














  01/10/19 01/09/19 01/09/19 Range/Units





  00:30 23:37 23:30 


 


WBC     (4.5-11.0)  10^3/uL


 


RBC     (3.5-6.1)  10^6/uL


 


Hgb     (14.0-18.0)  g/dL


 


Hct     (42.0-52.0)  %


 


MCV     (80.0-105.0)  fl


 


MCH     (25.0-35.0)  pg


 


MCHC     (31.0-37.0)  g/dl


 


RDW     (11.5-14.5)  %


 


Plt Count     (120.0-450.0)  10^3/uL


 


MPV     (7.0-11.0)  fl


 


Gran %     (50.0-68.0)  %


 


Lymph % (Auto)     (22.0-35.0)  %


 


Mono % (Auto)     (1.0-6.0)  %


 


Eos % (Auto)     (1.5-5.0)  %


 


Baso % (Auto)     (0.0-3.0)  %


 


Gran #     (1.4-6.5)  


 


Lymph # (Auto)     (1.2-3.4)  


 


Mono # (Auto)     (0.1-0.6)  


 


Eos # (Auto)     (0.0-0.7)  


 


Baso # (Auto)     (0.0-2.0)  K/mm3


 


PT     (9.4-12.5)  SECONDS


 


INR     


 


APTT     (25.1-36.5)  Seconds


 


pCO2     (35-45)  mm/Hg


 


pO2     ()  mm/Hg


 


HCO3     (21-28)  mmol/L


 


ABG pH     (7.35-7.45)  


 


ABG Total CO2     (22-28)  mmol.L


 


ABG O2 Saturation     (95-98)  %


 


ABG Base Excess     (-2.0-3.0)  mmol/L


 


ABG Potassium     (3.6-5.2)  mmol/L


 


Glucose     ()  mg/dl


 


Lactate     (0.7-2.1)  mmol/L


 


FiO2     %


 


Sodium     (132-148)  mmol/L


 


Potassium     (3.6-5.0)  mmol/L


 


Chloride     ()  mmol/L


 


Carbon Dioxide     (21-33)  mmol/L


 


Anion Gap     (10-20)  


 


BUN     (7-21)  mg/dL


 


Creatinine     (0.8-1.5)  mg/dl


 


Est GFR (African Amer)     


 


Est GFR (Non-Af Amer)     


 


POC Glucose (mg/dL)  82  40 L   ()  mg/dL


 


Random Glucose     ()  mg/dL


 


Calcium     (8.4-10.5)  mg/dL


 


Phosphorus     (2.5-4.5)  mg/dL


 


Magnesium     (1.7-2.2)  mg/dL


 


Total Bilirubin     (0.2-1.3)  mg/dL


 


AST     (17-59)  U/L


 


ALT     (7-56)  U/L


 


Alkaline Phosphatase     ()  U/L


 


Lactate Dehydrogenase     (333-699)  U/L


 


Total Creatine Kinase     ()  U/L


 


CK-MB (CK-2)     (0.0-3.6)  ng/mL


 


CK-MB (CK-2) %     (2.5-3.0)  %


 


Troponin I     ng/mL


 


NT-Pro-B Natriuret Pep     (0-450)  pg/mL


 


Total Protein     (5.8-8.3)  g/dL


 


Albumin     (3.0-4.8)  g/dL


 


Globulin     gm/dL


 


Albumin/Globulin Ratio     (1.1-1.8)  


 


Procalcitonin     (0.19-0.49)  NG/ML


 


Arterial Blood Potassium     (3.6-5.2)  mmol/L


 


Hep Bs Antigen     (NEGATIVE)  


 


Hep Bs Antibody     (NEGATIVE)  


 


Hep B Core IgM Ab     (NEGATIVE)  


 


Blood Type    B POSITIVE  


 


Antibody Screen    Negative  


 


BBK History Checked    Patient has bt  














  19 Range/Units





  21:10 20:30 20:30 


 


WBC    10.5  D  (4.5-11.0)  10^3/uL


 


RBC    2.62 L  (3.5-6.1)  10^6/uL


 


Hgb    7.7 L  (14.0-18.0)  g/dL


 


Hct    22.3 L  (42.0-52.0)  %


 


MCV    85.1  (80.0-105.0)  fl


 


MCH    29.4  (25.0-35.0)  pg


 


MCHC    34.5  (31.0-37.0)  g/dl


 


RDW    13.1  (11.5-14.5)  %


 


Plt Count    260  (120.0-450.0)  10^3/uL


 


MPV    10.4  (7.0-11.0)  fl


 


Gran %    79.2 H  (50.0-68.0)  %


 


Lymph % (Auto)    13.0 L  (22.0-35.0)  %


 


Mono % (Auto)    5.2  (1.0-6.0)  %


 


Eos % (Auto)    2.4  (1.5-5.0)  %


 


Baso % (Auto)    0.2  (0.0-3.0)  %


 


Gran #    8.31 H  (1.4-6.5)  


 


Lymph # (Auto)    1.4  (1.2-3.4)  


 


Mono # (Auto)    0.5  (0.1-0.6)  


 


Eos # (Auto)    0.3  (0.0-0.7)  


 


Baso # (Auto)    0.02  (0.0-2.0)  K/mm3


 


PT   12.5   (9.4-12.5)  SECONDS


 


INR   1.09   


 


APTT   30.8   (25.1-36.5)  Seconds


 


pCO2  25 L    (35-45)  mm/Hg


 


pO2  59.0 L    ()  mm/Hg


 


HCO3  13.8 L    (21-28)  mmol/L


 


ABG pH  7.35    (7.35-7.45)  


 


ABG Total CO2  14.6 L    (22-28)  mmol.L


 


ABG O2 Saturation  93.9 L    (95-98)  %


 


ABG Base Excess  -10.0 L    (-2.0-3.0)  mmol/L


 


ABG Potassium  6.5 H*    (3.6-5.2)  mmol/L


 


Glucose  124 H    ()  mg/dl


 


Lactate  1.0    (0.7-2.1)  mmol/L


 


FiO2  21.0    %


 


Sodium  136.0    (132-148)  mmol/L


 


Potassium     (3.6-5.0)  mmol/L


 


Chloride  108.0 H    ()  mmol/L


 


Carbon Dioxide     (21-33)  mmol/L


 


Anion Gap     (10-20)  


 


BUN     (7-21)  mg/dL


 


Creatinine     (0.8-1.5)  mg/dl


 


Est GFR (African Amer)     


 


Est GFR (Non-Af Amer)     


 


POC Glucose (mg/dL)     ()  mg/dL


 


Random Glucose     ()  mg/dL


 


Calcium     (8.4-10.5)  mg/dL


 


Phosphorus     (2.5-4.5)  mg/dL


 


Magnesium     (1.7-2.2)  mg/dL


 


Total Bilirubin     (0.2-1.3)  mg/dL


 


AST     (17-59)  U/L


 


ALT     (7-56)  U/L


 


Alkaline Phosphatase     ()  U/L


 


Lactate Dehydrogenase     (333-699)  U/L


 


Total Creatine Kinase     ()  U/L


 


CK-MB (CK-2)     (0.0-3.6)  ng/mL


 


CK-MB (CK-2) %     (2.5-3.0)  %


 


Troponin I     ng/mL


 


NT-Pro-B Natriuret Pep     (0-450)  pg/mL


 


Total Protein     (5.8-8.3)  g/dL


 


Albumin     (3.0-4.8)  g/dL


 


Globulin     gm/dL


 


Albumin/Globulin Ratio     (1.1-1.8)  


 


Procalcitonin     (0.19-0.49)  NG/ML


 


Arterial Blood Potassium  6.5 H*    (3.6-5.2)  mmol/L


 


Hep Bs Antigen     (NEGATIVE)  


 


Hep Bs Antibody     (NEGATIVE)  


 


Hep B Core IgM Ab     (NEGATIVE)  


 


Blood Type     


 


Antibody Screen     


 


BBK History Checked     














  19 Range/Units





  20:30 


 


WBC   (4.5-11.0)  10^3/uL


 


RBC   (3.5-6.1)  10^6/uL


 


Hgb   (14.0-18.0)  g/dL


 


Hct   (42.0-52.0)  %


 


MCV   (80.0-105.0)  fl


 


MCH   (25.0-35.0)  pg


 


MCHC   (31.0-37.0)  g/dl


 


RDW   (11.5-14.5)  %


 


Plt Count   (120.0-450.0)  10^3/uL


 


MPV   (7.0-11.0)  fl


 


Gran %   (50.0-68.0)  %


 


Lymph % (Auto)   (22.0-35.0)  %


 


Mono % (Auto)   (1.0-6.0)  %


 


Eos % (Auto)   (1.5-5.0)  %


 


Baso % (Auto)   (0.0-3.0)  %


 


Gran #   (1.4-6.5)  


 


Lymph # (Auto)   (1.2-3.4)  


 


Mono # (Auto)   (0.1-0.6)  


 


Eos # (Auto)   (0.0-0.7)  


 


Baso # (Auto)   (0.0-2.0)  K/mm3


 


PT   (9.4-12.5)  SECONDS


 


INR   


 


APTT   (25.1-36.5)  Seconds


 


pCO2   (35-45)  mm/Hg


 


pO2   ()  mm/Hg


 


HCO3   (21-28)  mmol/L


 


ABG pH   (7.35-7.45)  


 


ABG Total CO2   (22-28)  mmol.L


 


ABG O2 Saturation   (95-98)  %


 


ABG Base Excess   (-2.0-3.0)  mmol/L


 


ABG Potassium   (3.6-5.2)  mmol/L


 


Glucose   ()  mg/dl


 


Lactate   (0.7-2.1)  mmol/L


 


FiO2   %


 


Sodium  139  (132-148)  mmol/L


 


Potassium  6.6 H* D  (3.6-5.0)  mmol/L


 


Chloride  109 H  ()  mmol/L


 


Carbon Dioxide  17 L  (21-33)  mmol/L


 


Anion Gap  19  (10-20)  


 


BUN  117 H  (7-21)  mg/dL


 


Creatinine  16.7 H* D  (0.8-1.5)  mg/dl


 


Est GFR (African Amer)  4  


 


Est GFR (Non-Af Amer)  3  


 


POC Glucose (mg/dL)   ()  mg/dL


 


Random Glucose  110  ()  mg/dL


 


Calcium  6.2 L*  (8.4-10.5)  mg/dL


 


Phosphorus   (2.5-4.5)  mg/dL


 


Magnesium   (1.7-2.2)  mg/dL


 


Total Bilirubin  0.6  (0.2-1.3)  mg/dL


 


AST  39  (17-59)  U/L


 


ALT  87 H  (7-56)  U/L


 


Alkaline Phosphatase  256 H D  ()  U/L


 


Lactate Dehydrogenase  1531 H  (333-699)  U/L


 


Total Creatine Kinase  6783 H  ()  U/L


 


CK-MB (CK-2)  16.0 H  (0.0-3.6)  ng/mL


 


CK-MB (CK-2) %  0.2 L  (2.5-3.0)  %


 


Troponin I  0.01  D  ng/mL


 


NT-Pro-B Natriuret Pep  47615 H  (0-450)  pg/mL


 


Total Protein  7.2  (5.8-8.3)  g/dL


 


Albumin  3.5  (3.0-4.8)  g/dL


 


Globulin  3.7  gm/dL


 


Albumin/Globulin Ratio  0.9 L  (1.1-1.8)  


 


Procalcitonin   (0.19-0.49)  NG/ML


 


Arterial Blood Potassium   (3.6-5.2)  mmol/L


 


Hep Bs Antigen   (NEGATIVE)  


 


Hep Bs Antibody   (NEGATIVE)  


 


Hep B Core IgM Ab   (NEGATIVE)  


 


Blood Type   


 


Antibody Screen   


 


BBK History Checked   








                         Laboratory Results - last 24 hr











  19





  20:30 20:30 20:30


 


WBC   10.5  D 


 


RBC   2.62 L 


 


Hgb   7.7 L 


 


Hct   22.3 L 


 


MCV   85.1 


 


MCH   29.4 


 


MCHC   34.5 


 


RDW   13.1 


 


Plt Count   260 


 


MPV   10.4 


 


Gran %   79.2 H 


 


Lymph % (Auto)   13.0 L 


 


Mono % (Auto)   5.2 


 


Eos % (Auto)   2.4 


 


Baso % (Auto)   0.2 


 


Gran #   8.31 H 


 


Lymph # (Auto)   1.4 


 


Mono # (Auto)   0.5 


 


Eos # (Auto)   0.3 


 


Baso # (Auto)   0.02 


 


PT    12.5


 


INR    1.09


 


APTT    30.8


 


pCO2   


 


pO2   


 


HCO3   


 


ABG pH   


 


ABG Total CO2   


 


ABG O2 Saturation   


 


ABG Base Excess   


 


ABG Potassium   


 


Glucose   


 


Lactate   


 


FiO2   


 


Sodium  139  


 


Potassium  6.6 H* D  


 


Chloride  109 H  


 


Carbon Dioxide  17 L  


 


Anion Gap  19  


 


BUN  117 H  


 


Creatinine  16.7 H* D  


 


Est GFR ( Amer)  4  


 


Est GFR (Non-Af Amer)  3  


 


POC Glucose (mg/dL)   


 


Random Glucose  110  


 


Calcium  6.2 L*  


 


Phosphorus   


 


Magnesium   


 


Total Bilirubin  0.6  


 


AST  39  


 


ALT  87 H  


 


Alkaline Phosphatase  256 H D  


 


Lactate Dehydrogenase  1531 H  


 


Total Creatine Kinase  6783 H  


 


CK-MB (CK-2)  16.0 H  


 


CK-MB (CK-2) %  0.2 L  


 


Troponin I  0.01  D  


 


NT-Pro-B Natriuret Pep  21071 H  


 


Total Protein  7.2  


 


Albumin  3.5  


 


Globulin  3.7  


 


Albumin/Globulin Ratio  0.9 L  


 


Procalcitonin   


 


Arterial Blood Potassium   


 


Hep Bs Antigen   


 


Hep Bs Antibody   


 


Hep B Core IgM Ab   


 


Blood Type   


 


Antibody Screen   


 


BBK History Checked   














  19





  21:10 23:30 23:37


 


WBC   


 


RBC   


 


Hgb   


 


Hct   


 


MCV   


 


MCH   


 


MCHC   


 


RDW   


 


Plt Count   


 


MPV   


 


Gran %   


 


Lymph % (Auto)   


 


Mono % (Auto)   


 


Eos % (Auto)   


 


Baso % (Auto)   


 


Gran #   


 


Lymph # (Auto)   


 


Mono # (Auto)   


 


Eos # (Auto)   


 


Baso # (Auto)   


 


PT   


 


INR   


 


APTT   


 


pCO2  25 L  


 


pO2  59.0 L  


 


HCO3  13.8 L  


 


ABG pH  7.35  


 


ABG Total CO2  14.6 L  


 


ABG O2 Saturation  93.9 L  


 


ABG Base Excess  -10.0 L  


 


ABG Potassium  6.5 H*  


 


Glucose  124 H  


 


Lactate  1.0  


 


FiO2  21.0  


 


Sodium  136.0  


 


Potassium   


 


Chloride  108.0 H  


 


Carbon Dioxide   


 


Anion Gap   


 


BUN   


 


Creatinine   


 


Est GFR ( Amer)   


 


Est GFR (Non-Af Amer)   


 


POC Glucose (mg/dL)    40 L


 


Random Glucose   


 


Calcium   


 


Phosphorus   


 


Magnesium   


 


Total Bilirubin   


 


AST   


 


ALT   


 


Alkaline Phosphatase   


 


Lactate Dehydrogenase   


 


Total Creatine Kinase   


 


CK-MB (CK-2)   


 


CK-MB (CK-2) %   


 


Troponin I   


 


NT-Pro-B Natriuret Pep   


 


Total Protein   


 


Albumin   


 


Globulin   


 


Albumin/Globulin Ratio   


 


Procalcitonin   


 


Arterial Blood Potassium  6.5 H*  


 


Hep Bs Antigen   


 


Hep Bs Antibody   


 


Hep B Core IgM Ab   


 


Blood Type   B POSITIVE 


 


Antibody Screen   Negative 


 


BBK History Checked   Patient has bt 














  01/10/19 01/10/19 01/10/19





  00:30 05:20 05:59


 


WBC    11.0


 


RBC    2.55 L


 


Hgb    7.4 L


 


Hct    21.5 L


 


MCV    84.3


 


MCH    29.0


 


MCHC    34.4


 


RDW    13.1


 


Plt Count    273


 


MPV    10.4


 


Gran %    80.9 H


 


Lymph % (Auto)    12.4 L


 


Mono % (Auto)    4.4


 


Eos % (Auto)    2.1


 


Baso % (Auto)    0.2


 


Gran #    8.91 H


 


Lymph # (Auto)    1.4


 


Mono # (Auto)    0.5


 


Eos # (Auto)    0.2


 


Baso # (Auto)    0.02


 


PT   


 


INR   


 


APTT   


 


pCO2   


 


pO2   


 


HCO3   


 


ABG pH   


 


ABG Total CO2   


 


ABG O2 Saturation   


 


ABG Base Excess   


 


ABG Potassium   


 


Glucose   


 


Lactate   


 


FiO2   


 


Sodium   


 


Potassium   


 


Chloride   


 


Carbon Dioxide   


 


Anion Gap   


 


BUN   


 


Creatinine   


 


Est GFR ( Amer)   


 


Est GFR (Non-Af Amer)   


 


POC Glucose (mg/dL)  82  


 


Random Glucose   


 


Calcium   


 


Phosphorus   


 


Magnesium   


 


Total Bilirubin   


 


AST   


 


ALT   


 


Alkaline Phosphatase   


 


Lactate Dehydrogenase   


 


Total Creatine Kinase   


 


CK-MB (CK-2)   


 


CK-MB (CK-2) %   


 


Troponin I   


 


NT-Pro-B Natriuret Pep   


 


Total Protein   


 


Albumin   


 


Globulin   


 


Albumin/Globulin Ratio   


 


Procalcitonin   0.24 


 


Arterial Blood Potassium   


 


Hep Bs Antigen   


 


Hep Bs Antibody   


 


Hep B Core IgM Ab   


 


Blood Type   


 


Antibody Screen   


 


BBK History Checked   














  01/10/19 01/10/19 01/10/19





  06:30 07:38 08:30


 


WBC   


 


RBC   


 


Hgb   


 


Hct   


 


MCV   


 


MCH   


 


MCHC   


 


RDW   


 


Plt Count   


 


MPV   


 


Gran %   


 


Lymph % (Auto)   


 


Mono % (Auto)   


 


Eos % (Auto)   


 


Baso % (Auto)   


 


Gran #   


 


Lymph # (Auto)   


 


Mono # (Auto)   


 


Eos # (Auto)   


 


Baso # (Auto)   


 


PT   


 


INR   


 


APTT   


 


pCO2   


 


pO2   


 


HCO3   


 


ABG pH   


 


ABG Total CO2   


 


ABG O2 Saturation   


 


ABG Base Excess   


 


ABG Potassium   


 


Glucose   


 


Lactate   


 


FiO2   


 


Sodium  139  


 


Potassium  6.0 H*  


 


Chloride  109 H  


 


Carbon Dioxide  16 L  


 


Anion Gap  20  


 


BUN  112 H  


 


Creatinine  17.7 H*  


 


Est GFR ( Amer)  3  


 


Est GFR (Non-Af Amer)  3  


 


POC Glucose (mg/dL)   221 H 


 


Random Glucose  122 H  


 


Calcium  6.4 L*  


 


Phosphorus   


 


Magnesium   


 


Total Bilirubin  0.4  


 


AST  63 H D  


 


ALT  90 H  


 


Alkaline Phosphatase  253 H  


 


Lactate Dehydrogenase   


 


Total Creatine Kinase   


 


CK-MB (CK-2)   


 


CK-MB (CK-2) %   


 


Troponin I   


 


NT-Pro-B Natriuret Pep   


 


Total Protein  6.9  


 


Albumin  3.4  


 


Globulin  3.5  


 


Albumin/Globulin Ratio  1.0 L  


 


Procalcitonin   


 


Arterial Blood Potassium   


 


Hep Bs Antigen    Negative


 


Hep Bs Antibody   


 


Hep B Core IgM Ab    Negative


 


Blood Type   


 


Antibody Screen   


 


BBK History Checked   














  01/10/19 01/10/19 01/10/19





  08:30 08:37 10:20


 


WBC   


 


RBC   


 


Hgb   


 


Hct   


 


MCV   


 


MCH   


 


MCHC   


 


RDW   


 


Plt Count   


 


MPV   


 


Gran %   


 


Lymph % (Auto)   


 


Mono % (Auto)   


 


Eos % (Auto)   


 


Baso % (Auto)   


 


Gran #   


 


Lymph # (Auto)   


 


Mono # (Auto)   


 


Eos # (Auto)   


 


Baso # (Auto)   


 


PT   


 


INR   


 


APTT   


 


pCO2   


 


pO2   


 


HCO3   


 


ABG pH   


 


ABG Total CO2   


 


ABG O2 Saturation   


 


ABG Base Excess   


 


ABG Potassium   


 


Glucose   


 


Lactate   


 


FiO2   


 


Sodium   


 


Potassium   


 


Chloride   


 


Carbon Dioxide   


 


Anion Gap   


 


BUN   


 


Creatinine   


 


Est GFR ( Amer)   


 


Est GFR (Non-Af Amer)   


 


POC Glucose (mg/dL)   


 


Random Glucose   


 


Calcium   


 


Phosphorus   8.5 H 


 


Magnesium   1.6 L 


 


Total Bilirubin   


 


AST   


 


ALT   


 


Alkaline Phosphatase   


 


Lactate Dehydrogenase   


 


Total Creatine Kinase    5698 H


 


CK-MB (CK-2)    13.2 H


 


CK-MB (CK-2) %    0.2 L


 


Troponin I   


 


NT-Pro-B Natriuret Pep   


 


Total Protein   


 


Albumin   


 


Globulin   


 


Albumin/Globulin Ratio   


 


Procalcitonin   


 


Arterial Blood Potassium   


 


Hep Bs Antigen   


 


Hep Bs Antibody  Negative  


 


Hep B Core IgM Ab   


 


Blood Type   


 


Antibody Screen   


 


BBK History Checked   














  01/10/19 01/10/19 01/10/19





  10:20 11:10 13:00


 


WBC   


 


RBC   


 


Hgb   


 


Hct   


 


MCV   


 


MCH   


 


MCHC   


 


RDW   


 


Plt Count   


 


MPV   


 


Gran %   


 


Lymph % (Auto)   


 


Mono % (Auto)   


 


Eos % (Auto)   


 


Baso % (Auto)   


 


Gran #   


 


Lymph # (Auto)   


 


Mono # (Auto)   


 


Eos # (Auto)   


 


Baso # (Auto)   


 


PT   


 


INR   


 


APTT   


 


pCO2   


 


pO2   


 


HCO3   


 


ABG pH   


 


ABG Total CO2   


 


ABG O2 Saturation   


 


ABG Base Excess   


 


ABG Potassium   


 


Glucose   


 


Lactate   


 


FiO2   


 


Sodium    135


 


Potassium    3.8


 


Chloride    100


 


Carbon Dioxide    25


 


Anion Gap    14


 


BUN    53 H


 


Creatinine    9.6 H* D


 


Est GFR ( Amer)    7


 


Est GFR (Non-Af Amer)    6


 


POC Glucose (mg/dL)   137 H 


 


Random Glucose    185 H


 


Calcium    7.1 L


 


Phosphorus   


 


Magnesium   


 


Total Bilirubin   


 


AST   


 


ALT   


 


Alkaline Phosphatase   


 


Lactate Dehydrogenase   


 


Total Creatine Kinase   


 


CK-MB (CK-2)   


 


CK-MB (CK-2) %   


 


Troponin I  0.03  D  


 


NT-Pro-B Natriuret Pep   


 


Total Protein   


 


Albumin   


 


Globulin   


 


Albumin/Globulin Ratio   


 


Procalcitonin   


 


Arterial Blood Potassium   


 


Hep Bs Antigen   


 


Hep Bs Antibody   


 


Hep B Core IgM Ab   


 


Blood Type   


 


Antibody Screen   


 


BBK History Checked   














  01/10/19





  13:43


 


WBC 


 


RBC 


 


Hgb 


 


Hct 


 


MCV 


 


MCH 


 


MCHC 


 


RDW 


 


Plt Count 


 


MPV 


 


Gran % 


 


Lymph % (Auto) 


 


Mono % (Auto) 


 


Eos % (Auto) 


 


Baso % (Auto) 


 


Gran # 


 


Lymph # (Auto) 


 


Mono # (Auto) 


 


Eos # (Auto) 


 


Baso # (Auto) 


 


PT 


 


INR 


 


APTT 


 


pCO2 


 


pO2 


 


HCO3 


 


ABG pH 


 


ABG Total CO2 


 


ABG O2 Saturation 


 


ABG Base Excess 


 


ABG Potassium 


 


Glucose 


 


Lactate 


 


FiO2 


 


Sodium 


 


Potassium 


 


Chloride 


 


Carbon Dioxide 


 


Anion Gap 


 


BUN 


 


Creatinine 


 


Est GFR ( Amer) 


 


Est GFR (Non-Af Amer) 


 


POC Glucose (mg/dL)  211 H


 


Random Glucose 


 


Calcium 


 


Phosphorus 


 


Magnesium 


 


Total Bilirubin 


 


AST 


 


ALT 


 


Alkaline Phosphatase 


 


Lactate Dehydrogenase 


 


Total Creatine Kinase 


 


CK-MB (CK-2) 


 


CK-MB (CK-2) % 


 


Troponin I 


 


NT-Pro-B Natriuret Pep 


 


Total Protein 


 


Albumin 


 


Globulin 


 


Albumin/Globulin Ratio 


 


Procalcitonin 


 


Arterial Blood Potassium 


 


Hep Bs Antigen 


 


Hep Bs Antibody 


 


Hep B Core IgM Ab 


 


Blood Type 


 


Antibody Screen 


 


BBK History Checked 











Radiology Impressions: 


                              Radiology Impressions





Chest X-Ray  19 20:43


IMPRESSION:


Trace left pleural effusion.  No infiltrate bilaterally.  Tunneled 


central venous dialysis catheter in situ right chest.  


 


 











EKG/Cardiology Studies: 


Cardiology / EKG Studies





19 20:46


EKG [ELECTROCARDIOGRAM] Stat 


   Comment: 


   Reason For Exam: SOB














Critical Care Progress Note





- Nutrition


Nutrition: 


                                    Nutrition











 Category Date Time Status


 


 Renal Diet [DIET] Diets  01/10/19 Breakfast Ordered














Addendum


Addendum: 





01/10/19 17:38


ICU Attending Addendum 


Patient seen and examined. Case reviewed on round with housestaff.  Agree with 

resident note above with the following additions/exceptions:





53 year old male with past medical history of HTN, DM2, unspecified renal mass, 

hepatic hemangioma CKD on HD non compliant with HD since 2018 present with 

evid of fluids overlaod and mult lab abn requring HD


plan for HD and repeat labs after


if stable post-HD will transfer out of ICU





Rest of care as above


Saundra Dennis MD


Pulmonary Critical Care and Sleep Medicine

## 2019-01-10 NOTE — CP.PCM.HP
History of Present Illness





- History of Present Illness


History of Present Illness: 


Lilibeth Hudson, PGY-1, Internal Medicine History and Physical for Dr. Dennis





53 year old male with past medical history of diabetes mellitus type II, HTN, 

CKD on hemodialysis T,Th,Sa noncompliant started on 12/2018, renal mass, hepatic

hemangioma presents with shortness of breath and cough that started 1 month ago.

Patient reports he was at the hospital and was diagnosed with CKD needing 

dialysis. Tunneled catheter was placed during that admission and 3 round of 

hemodialysis were performed prior to patient leaving AMA. Patient describes 

shortness of breath as episodic and worsening with cold weather and ambulation. 

Patient denies worsening of shortness of breath with sleep or laying back. 

Patient has had bilateral lower extremity swelling, as well. Patient reports 

productive cough with yellow sputum that started two weeks ago. Sputum color 

progressed to red and brown with clots at an unknown time. Patient has continued

to have these symptoms. Patient also reports having an 4-5 episodes of vomitus 

one week ago that was dark in color. Patient has not had any vomiting episodes 

since then. Patient reports diarrhea that started one week ago that is brown and

normal in color that started one week ago. He is unsure of how many episodes and

diarrhea stopped a few days ago. Patient reports that he has been urinating 

normally with no hematuria. Patient was also given insulin with D50 in the 

emergency department for hyperkalemia and patient subsequently had diaphoresis 

as his only complaint. Patient was found to have POC of 40 subsequently. Patient

was given D50 again and returned to baseline with no diaphoresis. ROS is 

positive for chills, weakness, fatigue, depressed mood. Patient denies fever, 

headache, blurry vision, chest pain, heart palpitations, wheezing, abdominal 

pain, constipation, dysuria, hematuria, numbness/tingling. 12-point ROS was 

negative except for what was mentioned above.





PMH: as stated above


PSH: nose fracture repair


All: NKDA


FH: Mother: MI (51 y/o), Father: CABG, Daughter: Anemia


SH: Former smoker: <1 pack/day x 10 years. Occasional beer. Lives alone, 

independent


PMD: Dr. Miguelangel Saunders ([facility])


Pharmacy: [facility] Rx: noncompliant but reviewed on MAR








Present on Admission





- Present on Admission


Any Indicators Present on Admission: No





Review of Systems





- Review of Systems


Review of Systems: 


except for what was mentioned in the HPI








Past Patient History





- Infectious Disease


Hx of Infectious Diseases: None





- Past Medical History & Family History


Past Medical History?: Yes





- Past Social History


Smoking Status: Former Smoker





- CARDIAC


Hx Cardiac Disorders: Yes


Hx Hypertension: Yes





- PULMONARY


Hx Respiratory Disorders: No





- NEUROLOGICAL


Hx Neurological Disorder: No





- HEENT


Hx HEENT Problems: No





- RENAL


Hx Chronic Kidney Disease: No


Hx Dialysis: Yes


Type of Dialysis Access: R chest cath





- ENDOCRINE/METABOLIC


Hx Diabetes Mellitus Type 1: Yes





- HEMATOLOGICAL/ONCOLOGICAL


Hx Blood Disorders: No





- INTEGUMENTARY


Hx Dermatological Problems: No





- MUSCULOSKELETAL/RHEUMATOLOGICAL


Hx Musculoskeletal Disorders: No





- GASTROINTESTINAL


Hx Gastrointestinal Disorders: No





- GENITOURINARY/GYNECOLOGICAL


Hx Genitourinary Disorders: No





- PSYCHIATRIC


Hx Psychophysiologic Disorder: No


Hx Substance Use: No





- SURGICAL HISTORY


Hx Cardiac Catheterization: Yes





- ANESTHESIA


Hx Anesthesia: No





Meds


Allergies/Adverse Reactions: 


                                    Allergies











Allergy/AdvReac Type Severity Reaction Status Date / Time


 


No Known Allergies Allergy   Verified 01/09/19 20:01














Physical Exam





- Constitutional


Appears: Well, Non-toxic, No Acute Distress





- Head Exam


Head Exam: ATRAUMATIC, NORMAL INSPECTION, NORMOCEPHALIC





- Eye Exam


Eye Exam: EOMI, PERRL





- ENT Exam


ENT Exam: Mucous Membranes Dry





- Neck Exam


Neck exam: Positive for: Normal Inspection





- Respiratory Exam


Respiratory Exam: Clear to Auscultation Bilateral, NORMAL BREATHING PATTERN





- Cardiovascular Exam


Cardiovascular Exam: REGULAR RHYTHM, RRR





- GI/Abdominal Exam


GI & Abdominal Exam: Distended, Normal Bowel Sounds, Soft





- Extremities Exam


Extremities exam: Positive for: full ROM, pedal edema (3+)





- Neurological Exam


Neurological exam: Alert, CN II-XII Intact, Oriented x3





- Skin


Skin Exam: Dry, Intact, Normal Color





Results





- Vital Signs


Recent Vital Signs: 





                                Last Vital Signs











Temp  98 F   01/10/19 02:19


 


Pulse  94 H  01/10/19 02:20


 


Resp  18   01/10/19 02:20


 


BP  162/76 H  01/10/19 02:20


 


Pulse Ox  98   01/10/19 02:20














- Labs


Result Diagrams: 


                                 01/09/19 20:30





                                 01/09/19 20:30


Labs: 





                         Laboratory Results - last 24 hr











  01/09/19 01/09/19 01/09/19





  20:30 20:30 20:30


 


WBC   10.5  D 


 


RBC   2.62 L 


 


Hgb   7.7 L 


 


Hct   22.3 L 


 


MCV   85.1 


 


MCH   29.4 


 


MCHC   34.5 


 


RDW   13.1 


 


Plt Count   260 


 


MPV   10.4 


 


Gran %   79.2 H 


 


Lymph % (Auto)   13.0 L 


 


Mono % (Auto)   5.2 


 


Eos % (Auto)   2.4 


 


Baso % (Auto)   0.2 


 


Gran #   8.31 H 


 


Lymph # (Auto)   1.4 


 


Mono # (Auto)   0.5 


 


Eos # (Auto)   0.3 


 


Baso # (Auto)   0.02 


 


PT    12.5


 


INR    1.09


 


APTT    30.8


 


pCO2   


 


pO2   


 


HCO3   


 


ABG pH   


 


ABG Total CO2   


 


ABG O2 Saturation   


 


ABG Base Excess   


 


ABG Potassium   


 


Glucose   


 


Lactate   


 


FiO2   


 


Sodium  139  


 


Potassium  6.6 H* D  


 


Chloride  109 H  


 


Carbon Dioxide  17 L  


 


Anion Gap  19  


 


BUN  117 H  


 


Creatinine  16.7 H* D  


 


Est GFR ( Amer)  4  


 


Est GFR (Non-Af Amer)  3  


 


POC Glucose (mg/dL)   


 


Random Glucose  110  


 


Calcium  6.2 L*  


 


Total Bilirubin  0.6  


 


AST  39  


 


ALT  87 H  


 


Alkaline Phosphatase  256 H D  


 


Lactate Dehydrogenase  1531 H  


 


Total Creatine Kinase  6783 H  


 


CK-MB (CK-2)  16.0 H  


 


CK-MB (CK-2) %  0.2 L  


 


Troponin I  0.01  D  


 


NT-Pro-B Natriuret Pep  66599 H  


 


Total Protein  7.2  


 


Albumin  3.5  


 


Globulin  3.7  


 


Albumin/Globulin Ratio  0.9 L  


 


Arterial Blood Potassium   


 


Blood Type   


 


Antibody Screen   


 


BBK History Checked   














  01/09/19 01/09/19 01/09/19





  21:10 23:30 23:37


 


WBC   


 


RBC   


 


Hgb   


 


Hct   


 


MCV   


 


MCH   


 


MCHC   


 


RDW   


 


Plt Count   


 


MPV   


 


Gran %   


 


Lymph % (Auto)   


 


Mono % (Auto)   


 


Eos % (Auto)   


 


Baso % (Auto)   


 


Gran #   


 


Lymph # (Auto)   


 


Mono # (Auto)   


 


Eos # (Auto)   


 


Baso # (Auto)   


 


PT   


 


INR   


 


APTT   


 


pCO2  25 L  


 


pO2  59.0 L  


 


HCO3  13.8 L  


 


ABG pH  7.35  


 


ABG Total CO2  14.6 L  


 


ABG O2 Saturation  93.9 L  


 


ABG Base Excess  -10.0 L  


 


ABG Potassium  6.5 H*  


 


Glucose  124 H  


 


Lactate  1.0  


 


FiO2  21.0  


 


Sodium  136.0  


 


Potassium   


 


Chloride  108.0 H  


 


Carbon Dioxide   


 


Anion Gap   


 


BUN   


 


Creatinine   


 


Est GFR ( Amer)   


 


Est GFR (Non-Af Amer)   


 


POC Glucose (mg/dL)    40 L


 


Random Glucose   


 


Calcium   


 


Total Bilirubin   


 


AST   


 


ALT   


 


Alkaline Phosphatase   


 


Lactate Dehydrogenase   


 


Total Creatine Kinase   


 


CK-MB (CK-2)   


 


CK-MB (CK-2) %   


 


Troponin I   


 


NT-Pro-B Natriuret Pep   


 


Total Protein   


 


Albumin   


 


Globulin   


 


Albumin/Globulin Ratio   


 


Arterial Blood Potassium  6.5 H*  


 


Blood Type   B POSITIVE 


 


Antibody Screen   Negative 


 


BBK History Checked   Patient has bt 














Assessment & Plan





- Assessment and Plan (Free Text)


Assessment: 


53 year old male with past medical history of diabetes mellitus type II, HTN, 

CKD on hemodialysis T,Th,Sa noncompliant started on 12/2018, renal mass, hepatic

hemangioma presents with shortness of breath and cough that started 1 month ago.





Pulmonary Edema


Acute renal failure overlying CKD stage V supposed to be on dialysis T,Th,Sa


Hemoptysis and hematemesis possibly 2/2 to uremic platelet dysfunction


Rhabdomyolysis


Elevate BNP 2/2 to fluid overload vs. CHF


Anemia


Hyperkalemia


Low bicarbonate


Hypocalcemia


Elevated LFTs


Hypoxic, hypocarbic 2/2 to Pulmonary edema


HTN


HLD


Diabetes melitus type II





Plan: 


Neuro:  





-AAOx3, no FND, moving extremities past midline. 


-Elevated BUN at 117. Continue to monitor for uremic encephalopathy. 


-Monitor neuro status.   


-Reorient patient as necessary.  





Cardio:  





-RRR, hypertensive at 190/88 initially, no signs of HD compromise  


-EKG: NSR with ventricular rate of 92


-BNP: 74395 with baseline of 9500


-Elevated BNP likely due to fluid overload from acute renal failure


-Continue home lipitor.


-Nicardipine drip for elevated blood pressure. Avoid HCTZ, ACE I, and ARB due to

renal function.


-Maintain MAP>65. 


-Monitor for S/S, HD compromise.  





Pulm:  





-No signs of respiratory distress. CTA B/L  


-Patient is stating well on nasal cannula


-Maintain O2 saturation>95%.  


-Patient should be on BiPap 12/5/12/50% for hypoxia


-CXR: hazy infiltrates throughout as read by me. Patient likely volume 

overloaded with pulmonary edema


-One time dose of lasix given. Possible HD in the morning which will likely 

relieve pulmonary edema


-Elevate bed to 30 degrees  





GI:  





-Renal/GI hepatic/consistent carbohydrate diet


-Patient has elevated ALT, ALP but unremarkable AST possibly due to congestive 

hepatomegaly.


-Patient had Liver CT on last admission one month ago that showed hepatic 

hemangioma. Would hold off on imaging at this time.


-PT/INR, PTT within normal limits, so doubt hepatic failure as etiology of 

hemoptysis and hematemesis.


-Dr. Olivera, GI, consulted for further recommendations.


-Protonix





/Nephro:  





-BUN/Cr increased to 117/16.7


-Low urine output  


-80 mg lasix given for diuresis


-Acute renal failure likely etiology for pulmonary edema


-Patient for likely dialysis in the morning as patient is hyperkalemia as well 

with K: 6.6. Follow recommendations from nephrology, Dr. Anton.


-For hyperkalemia, patient given kayexelate, calcium gluconate, and insulin and 

D50


-Monitor insulin dose closely as insulin is filtered by the kidneys and patient 

has poor kidney function


-Elevated CK of 6783 so likely rhabdomyolysis but will hold off on IV fluids at 

this time because of fluid overload.


-Elevated BNP likely due to fluid overload


-For hypocalcemia, give calcium gluconate. Continue daily calcium acetate and 

calcitriol.


-Follow up UA


-Strict I and Os


-Daily weights


-Continue monitoring.  


-Replete electrolytes as needed.  


-Maintain euvolemia.  





Endocrinology:  





-Random glucose: 110


-Last POC glucose was 40 after insulin and D50 administration for hyperkalemia. 

Patient was given D50 again and patient's symptoms and glucose improved.


-Modified Low SSI.


-Maintain euglycemia.  





Heme/Onc:  





-H/H at 7.7/22.3. Baseline is 7-9


-Consider transfusion of PRBC if hemoglobin<7


-No signs of HD compromise.  


-Continue monitoring H/H  





ID:  





-Afebrile, no leukocytosis


-Due to patient's symptomatology and CXR findings, will treat empirically for 

CAP with azithromycin.  


-Follow up BCx, UCx, Procalcitonin


-Lactate: 1.0


-Monitor for signs and symptoms of infection.  





DVT prophylaxis: SCD


GI prophylaxis:  protonix





Patient seen and examined with Dr. Dennis.








- Date & Time


Date: 01/10/19


Time: 04:57

## 2019-01-10 NOTE — CP.PCM.CON
History of Present Illness





- History of Present Illness


History of Present Illness: 


Lilibeth Hudson, PGY-1, ICU Consult Note for Dr. Dennis





53 year old male with past medical history of diabetes mellitus type II, HTN, 

CKD on hemodialysis T,Th,Sa noncompliant started on 12/2018, renal mass, hepatic

hemangioma presents with shortness of breath and cough that started 1 month ago.

Patient reports he was at the hospital and was diagnosed with CKD needing 

dialysis. Tunneled catheter was placed during that admission and 3 round of 

hemodialysis were performed prior to patient leaving AMA. Patient describes 

shortness of breath as episodic and worsening with cold weather and ambulation. 

Patient denies worsening of shortness of breath with sleep or laying back. 

Patient has had bilateral lower extremity swelling, as well. Patient reports 

productive cough with yellow sputum that started two weeks ago. Sputum color 

progressed to red and brown with clots at an unknown time. Patient has continued

to have these symptoms. Patient also reports having an 4-5 episodes of vomitus 

one week ago that was dark in color. Patient has not had any vomiting episodes 

since then. Patient reports diarrhea that started one week ago that is brown and

normal in color that started one week ago. He is unsure of how many episodes and

diarrhea stopped a few days ago. Patient reports that he has been urinating 

normally with no hematuria. Patient was also given insulin with D50 in the 

emergency department for hyperkalemia and patient subsequently had diaphoresis 

as his only complaint. Patient was found to have POC of 40 subsequently. Patient

was given D50 again and returned to baseline with no diaphoresis. ROS is 

positive for chills, weakness, fatigue, depressed mood. Patient denies fever, 

headache, blurry vision, chest pain, heart palpitations, wheezing, abdominal 

pain, constipation, dysuria, hematuria, numbness/tingling. 12-point ROS was 

negative except for what was mentioned above.





PMH: as stated above


PSH: nose fracture repair


All: NKDA


FH: Mother: MI (49 y/o), Father: CABG, Daughter: Anemia


SH: Former smoker: <1 pack/day x 10 years. Occasional beer. Lives alone, 

independent


PMD: Dr. Miguelangel Saunders ([facility])


Pharmacy: [facility] Rx: noncompliant but reviewed on MAR








Review of Systems





- Review of Systems


Review of Systems: 


except as stated in HPI








Past Patient History





- Infectious Disease


Hx of Infectious Diseases: None





- Past Medical History & Family History


Past Medical History?: Yes





- Past Social History


Smoking Status: Former Smoker





- CARDIAC


Hx Cardiac Disorders: Yes


Hx Hypertension: Yes





- PULMONARY


Hx Respiratory Disorders: No





- NEUROLOGICAL


Hx Neurological Disorder: No





- HEENT


Hx HEENT Problems: No





- RENAL


Hx Chronic Kidney Disease: No


Hx Dialysis: Yes


Type of Dialysis Access: R chest cath





- ENDOCRINE/METABOLIC


Hx Diabetes Mellitus Type 1: Yes





- HEMATOLOGICAL/ONCOLOGICAL


Hx Blood Disorders: No





- INTEGUMENTARY


Hx Dermatological Problems: No





- MUSCULOSKELETAL/RHEUMATOLOGICAL


Hx Musculoskeletal Disorders: No





- GASTROINTESTINAL


Hx Gastrointestinal Disorders: No





- GENITOURINARY/GYNECOLOGICAL


Hx Genitourinary Disorders: No





- PSYCHIATRIC


Hx Psychophysiologic Disorder: No


Hx Substance Use: No





- SURGICAL HISTORY


Hx Cardiac Catheterization: Yes





- ANESTHESIA


Hx Anesthesia: No





Meds


Allergies/Adverse Reactions: 


                                    Allergies











Allergy/AdvReac Type Severity Reaction Status Date / Time


 


No Known Allergies Allergy   Verified 01/09/19 20:01














- Medications


Medications: 


                               Current Medications





Atorvastatin Calcium (Lipitor)  80 mg PO DAILY [facility]


Calcitriol (Rocaltrol)  0.5 mcg PO DAILY [facility]


Calcium Acetate (Phoslo)  667 mg PO WM [facility]


Dextrose (Dextrose 50% Inj)  0 ml IV STAT PRN; Protocol


   PRN Reason: Hypoglycemia Protocol


Nicardipine HCl (Cardene Iv Premix)  20 mg in 200 mls @ 50 mls/hr IV .Q4H PRN; 

Protocol


   PRN Reason: TITRATE PER MD ORDER


   Last Admin: 01/10/19 01:13 Dose:  5 mg/hr, 50 mls/hr


Azithromycin (Zithromax 500mg In Ns)  500 mg in 250 mls @ 167 mls/hr IVPB DAILY 

[facility]; Protocol


Dextrose (Dextrose 5% In Water 1000 Ml)  1,000 mls @ 0 mls/hr IV .Q0M PRN; 

Protocol


   PRN Reason: Hypoglycemia Protocol


Insulin Human Regular (Humulin R Med)  0 units SC ACHS [facility]; Protocol


Vitamin D (Vitamin D 400 Intl Units Tab)  1,200 intlu PO DAILY [facility]











Physical Exam





- Constitutional


Appears: Well, Non-toxic, No Acute Distress





- Head Exam


Head Exam: ATRAUMATIC, NORMAL INSPECTION, NORMOCEPHALIC





- Eye Exam


Eye Exam: EOMI, PERRL





- ENT Exam


ENT Exam: Mucous Membranes Dry





- Neck Exam


Neck exam: Positive for: Normal Inspection





- Respiratory Exam


Respiratory Exam: Clear to Auscultation Bilateral, NORMAL BREATHING PATTERN





- Cardiovascular Exam


Cardiovascular Exam: REGULAR RHYTHM, RRR





- GI/Abdominal Exam


GI & Abdominal Exam: Distended, Normal Bowel Sounds, Soft





- Extremities Exam


Extremities exam: Positive for: full ROM, pedal edema (+3)





- Neurological Exam


Neurological exam: Alert, CN II-XII Intact, Oriented x3





- Skin


Skin Exam: Dry, Intact, Normal Color





Results





- Vital Signs


Recent Vital Signs: 


                                Last Vital Signs











Temp  97.9 F   01/09/19 20:04


 


Pulse  92 H  01/09/19 23:52


 


Resp  23   01/09/19 23:52


 


BP  190/88 H  01/09/19 23:52


 


Pulse Ox  92 L  01/09/19 23:52














- Labs


Result Diagrams: 


                                 01/09/19 20:30





                                 01/09/19 20:30


Labs: 


                         Laboratory Results - last 24 hr











  01/09/19 01/09/19 01/09/19





  20:30 20:30 20:30


 


WBC   10.5  D 


 


RBC   2.62 L 


 


Hgb   7.7 L 


 


Hct   22.3 L 


 


MCV   85.1 


 


MCH   29.4 


 


MCHC   34.5 


 


RDW   13.1 


 


Plt Count   260 


 


MPV   10.4 


 


Gran %   79.2 H 


 


Lymph % (Auto)   13.0 L 


 


Mono % (Auto)   5.2 


 


Eos % (Auto)   2.4 


 


Baso % (Auto)   0.2 


 


Gran #   8.31 H 


 


Lymph # (Auto)   1.4 


 


Mono # (Auto)   0.5 


 


Eos # (Auto)   0.3 


 


Baso # (Auto)   0.02 


 


PT    12.5


 


INR    1.09


 


APTT    30.8


 


pCO2   


 


pO2   


 


HCO3   


 


ABG pH   


 


ABG Total CO2   


 


ABG O2 Saturation   


 


ABG Base Excess   


 


ABG Potassium   


 


Glucose   


 


Lactate   


 


FiO2   


 


Sodium  139  


 


Potassium  6.6 H* D  


 


Chloride  109 H  


 


Carbon Dioxide  17 L  


 


Anion Gap  19  


 


BUN  117 H  


 


Creatinine  16.7 H* D  


 


Est GFR ( Amer)  4  


 


Est GFR (Non-Af Amer)  3  


 


POC Glucose (mg/dL)   


 


Random Glucose  110  


 


Calcium  6.2 L*  


 


Total Bilirubin  0.6  


 


AST  39  


 


ALT  87 H  


 


Alkaline Phosphatase  256 H D  


 


Lactate Dehydrogenase  1531 H  


 


Total Creatine Kinase  6783 H  


 


CK-MB (CK-2)  16.0 H  


 


CK-MB (CK-2) %  0.2 L  


 


Troponin I  0.01  D  


 


NT-Pro-B Natriuret Pep  02463 H  


 


Total Protein  7.2  


 


Albumin  3.5  


 


Globulin  3.7  


 


Albumin/Globulin Ratio  0.9 L  


 


Arterial Blood Potassium   


 


BBK History Checked   














  01/09/19 01/09/19 01/09/19





  21:10 23:30 23:37


 


WBC   


 


RBC   


 


Hgb   


 


Hct   


 


MCV   


 


MCH   


 


MCHC   


 


RDW   


 


Plt Count   


 


MPV   


 


Gran %   


 


Lymph % (Auto)   


 


Mono % (Auto)   


 


Eos % (Auto)   


 


Baso % (Auto)   


 


Gran #   


 


Lymph # (Auto)   


 


Mono # (Auto)   


 


Eos # (Auto)   


 


Baso # (Auto)   


 


PT   


 


INR   


 


APTT   


 


pCO2  25 L  


 


pO2  59.0 L  


 


HCO3  13.8 L  


 


ABG pH  7.35  


 


ABG Total CO2  14.6 L  


 


ABG O2 Saturation  93.9 L  


 


ABG Base Excess  -10.0 L  


 


ABG Potassium  6.5 H*  


 


Glucose  124 H  


 


Lactate  1.0  


 


FiO2  21.0  


 


Sodium  136.0  


 


Potassium   


 


Chloride  108.0 H  


 


Carbon Dioxide   


 


Anion Gap   


 


BUN   


 


Creatinine   


 


Est GFR ( Amer)   


 


Est GFR (Non-Af Amer)   


 


POC Glucose (mg/dL)    40 L


 


Random Glucose   


 


Calcium   


 


Total Bilirubin   


 


AST   


 


ALT   


 


Alkaline Phosphatase   


 


Lactate Dehydrogenase   


 


Total Creatine Kinase   


 


CK-MB (CK-2)   


 


CK-MB (CK-2) %   


 


Troponin I   


 


NT-Pro-B Natriuret Pep   


 


Total Protein   


 


Albumin   


 


Globulin   


 


Albumin/Globulin Ratio   


 


Arterial Blood Potassium  6.5 H*  


 


BBK History Checked   Patient has bt 














Assessment & Plan





- Assessment and Plan (Free Text)


Assessment: 


53 year old male with past medical history of diabetes mellitus type II, HTN, 

CKD on hemodialysis T,Th,Sa noncompliant started on 12/2018, renal mass, hepatic

hemangioma presents with shortness of breath and cough that started 1 month ago.





Pulmonary Edema


Acute renal failure overlying CKD stage V supposed to be on dialysis T,Th,Sa


Hemoptysis and hematemesis possibly 2/2 to uremic platelet dysfunction


Rhabdomyolysis


Elevate BNP 2/2 to fluid overload vs. CHF


Anemia


Hyperkalemia


Low bicarbonate


Hypocalcemia


Elevated LFTs


Hypoxic, hypocarbic 2/2 to Pulmonary edema


HTN


HLD


Diabetes melitus type II

















Plan: 


Neuro:  





-AAOx3, no FND, moving extremities past midline. 


-Elevated BUN at 117. Continue to monitor for uremic encephalopathy. 


-Monitor neuro status.   


-Reorient patient as necessary.  





Cardio:  





-RRR, hypertensive at 190/88 initially, no signs of HD compromise  


-EKG: NSR with ventricular rate of 92


-BNP: 60879 with baseline of 9500


-Elevated BNP likely due to fluid overload from acute renal failure


-Continue home lipitor.


-Nicardipine drip for elevated blood pressure. Avoid HCTZ, ACE I, and ARB due to

renal function.


-Maintain MAP>65. 


-Monitor for S/S, HD compromise.  





Pulm:  





-No signs of respiratory distress. CTA B/L  


-Patient is stating well on nasal cannula


-Maintain O2 saturation>95%.  


-Patient should be on BiPap 12/5/12/50% for hypoxia


-CXR: hazy infiltrates throughout as read by me. Patient likely volume 

overloaded with pulmonary edema


-One time dose of lasix given. Possible HD in the morning which will likely re

lieve pulmonary edema


-Elevate bed to 30 degrees  





GI:  





-Renal/GI hepatic/consistent carbohydrate diet


-Patient has elevated ALT, ALP but unremarkable AST possibly due to congestive 

hepatomegaly.


-Patient had Liver CT on last admission one month ago that showed hepatic 

hemangioma. Would hold off on imaging at this time.


-PT/INR, PTT within normal limits, so doubt hepatic failure as etiology of 

hemoptysis and hematemesis.


-Dr. Olivera, GI, consulted for further recommendations.


-Protonix





/Nephro:  





-BUN/Cr increased to 117/16.7


-Low urine output  


-80 mg lasix given for diuresis


-Acute renal failure likely etiology for pulmonary edema


-Patient for likely dialysis in the morning as patient is hyperkalemia as well 

with K: 6.6. Follow recommendations from nephrology, Dr. Anton.


-For hyperkalemia, patient given kayexelate, calcium gluconate, and insulin and 

D50


-Monitor insulin dose closely as insulin is filtered by the kidneys and patient 

has poor kidney function


-Elevated CK of 6783 so likely rhabdomyolysis but will hold off on IV fluids at 

this time because of fluid overload.


-Elevated BNP likely due to fluid overload


-For hypocalcemia, give calcium gluconate. Continue daily calcium acetate and 

calcitriol.


-Follow up UA


-Strict I and Os


-Daily weights


-Continue monitoring.  


-Replete electrolytes as needed.  


-Maintain euvolemia.  





Endocrinology:  





-Random glucose: 110


-Last POC glucose was 40 after insulin and D50 administration for hyperkalemia. 

Patient was given D50 again and patient's symptoms and glucose improved.


-Modified Low SSI.


-Maintain euglycemia.  





Heme/Onc:  





-H/H at 7.7/22.3. Baseline is 7-9


-Consider transfusion of PRBC if hemoglobin<7


-No signs of HD compromise.  


-Continue monitoring H/H  





ID:  





-Afebrile, no leukocytosis


-Due to patient's symptomatology and CXR findings, will treat empirically for 

CAP with azithromycin.  


-Follow up BCx, UCx, Procalcitonin


-Lactate: 1.0


-Monitor for signs and symptoms of infection.  





DVT prophylaxis: SCD


GI prophylaxis:  protonix





Patient seen and examined with Dr. Dennis.





 








- Date & Time


Date: 01/10/19


Time: 01:34

## 2019-01-11 NOTE — CP.PCM.PN
<Alec Jones - Last Filed: 01/11/19 13:35>





Subjective





- Date & Time of Evaluation


Date of Evaluation: 01/11/19


Time of Evaluation: 06:00





- Subjective


Subjective: 





Pt seen and examined this morning in the ICU. Pt denies chest pain, SOB, 

dizziness or headaches. 





Objective





- Vital Signs/Intake and Output


Vital Signs (last 24 hours): 


                                        











Temp Pulse Resp BP Pulse Ox


 


 98.3 F   88   57 H  163/73 H  100 


 


 01/11/19 04:00  01/11/19 12:19  01/11/19 12:15  01/11/19 12:19  01/11/19 06:00








Intake and Output: 


                                        











 01/11/19 01/11/19





 06:59 18:59


 


Output Total 1200 


 


Balance -1200 














- Medications


Medications: 


                               Current Medications





Amlodipine Besylate (Norvasc)  10 mg PO DAILY [facility]


   Last Admin: 01/11/19 12:19 Dose:  10 mg


Atorvastatin Calcium (Lipitor)  40 mg PO HS [facility]


   Last Admin: 01/10/19 21:50 Dose:  40 mg


Calcium Acetate (Phoslo)  1,334 mg PO WM [facility]


   Last Admin: 01/11/19 08:58 Dose:  1,334 mg


Darbepoetin Kevin (Aranesp)  100 mcg IVP QWK [facility]


   Last Admin: 01/11/19 09:39 Dose:  100 mcg


Dextrose (Dextrose 50% Inj)  0 ml IV STAT PRN; Protocol


   PRN Reason: Hypoglycemia Protocol


Ergocalciferol (Drisdol 50,000 Intl Units Cap)  1 cap PO Q7D [facility]


   Last Admin: 01/10/19 11:41 Dose:  1 cap


Heparin Sodium (Porcine) (Heparin)  2,000 units IVP TTS [facility]; Protocol


Dextrose (Dextrose 5% In Water 1000 Ml)  1,000 mls @ 0 mls/hr IV .Q0M PRN; 

Protocol


   PRN Reason: Hypoglycemia Protocol


Iron Sucrose 100 mg/ Sodium (Chloride)  105 mls @ 210 mls/hr IVPB DAILY [facility]


   Stop: 01/20/19 11:16


   Last Admin: 01/10/19 14:40 Dose:  210 mls/hr


Insulin Human Lispro (Humalog Low)  0 units SC ACHS [facility]; Protocol


   Last Admin: 01/11/19 08:54 Dose:  Not Given


Labetalol HCl (Trandate)  200 mg PO BID [facility]


   Last Admin: 01/11/19 12:19 Dose:  200 mg


Pantoprazole Sodium (Protonix Ec Tab)  40 mg PO ACB [facility]


Vitamin B Complex/Vit C/Folic Acid (Nephro-Dave)  1 tab PO 0800 [facility]


   Last Admin: 01/11/19 08:58 Dose:  1 tab











- Labs


Labs: 


                                        





                                 01/11/19 05:40 





                                 01/11/19 05:40 





                                        











PT  12.5 SECONDS (9.4-12.5)   01/09/19  20:30    


 


INR  1.09   01/09/19  20:30    


 


APTT  30.8 Seconds (25.1-36.5)   01/09/19  20:30    














- Constitutional


Appears: No Acute Distress





- Head Exam


Head Exam: ATRAUMATIC, NORMOCEPHALIC





- ENT Exam


ENT Exam: Mucous Membranes Moist





- Neck Exam


Neck Exam: Full ROM





- Respiratory Exam


Respiratory Exam: Clear to Ausculation Bilateral, NORMAL BREATHING PATTERN.  

absent: Accessory Muscle Use, Wheezes, Respiratory Distress





- Cardiovascular Exam


Cardiovascular Exam: RRR, +S1, +S2.  absent: Diastolic murmur, Murmur





- GI/Abdominal Exam


GI & Abdominal Exam: Soft, Normal Bowel Sounds





- Extremities Exam


Extremities Exam: Full ROM.  absent: Pedal Edema





- Neurological Exam


Neurological Exam: Alert, Awake, Oriented x3





- Psychiatric Exam


Psychiatric exam: Normal Affect, Normal Mood





- Skin


Skin Exam: Dry, Intact, Warm





Assessment and Plan





- Assessment and Plan (Free Text)


Assessment: 





Pt is a 52 yo male with a PMH of DMII, HTN, CKD on hemodialysis Tue, Thurs, Sat 

but is noncompliant, hepatic hemangioma presents with SOB and cough that started

1 month ago. 





Plan: 





CKD Stage noncompliant with dialysis  


- Cr 10.7


- BUN 60


- Continue to monitor for uremic encephalopathy. 


- nephrology, Dr. Anton.


- calcium acetate


- hemodialysis TTS


- working to try to find placement for pt to get hemodialysis 


- pt recieved 3 hours fo hemodialysis today, will start TTS tomorrow 


- transfer to med surg


- Surgery consulted, Dr Clarke for AV fistula 





HTN


- amlodipine 10


- labetalol 200mg BID





DM


- Modified Low SSI.


- accuchecks ACHS





HLD


- lipitor 40 HS





Anemia


- Hgb 7.5


- darbepoetin kevin


- venofer





Ppx, diet


- Heparin


- pantoprazole 


- renal diet


- SCD








Pt seen, examined, assessment and plan discussed with Dr Rosa Jones PGY1, Internal Medicine Resident 





<Khadra Lee - Last Filed: 01/11/19 18:27>





Objective





- Vital Signs/Intake and Output


Vital Signs (last 24 hours): 


                                        











Temp Pulse Resp BP Pulse Ox


 


 98.3 F   88   39 H  154/54 H  100 


 


 01/11/19 04:00  01/11/19 17:17  01/11/19 15:31  01/11/19 17:17  01/11/19 06:00








Intake and Output: 


                                        











 01/11/19 01/11/19





 06:59 18:59


 


Output Total 1200 


 


Balance -1200 














- Medications


Medications: 


                               Current Medications





Amlodipine Besylate (Norvasc)  10 mg PO DAILY [facility]


   Last Admin: 01/11/19 12:19 Dose:  10 mg


Atorvastatin Calcium (Lipitor)  40 mg PO HS [facility]


   Last Admin: 01/10/19 21:50 Dose:  40 mg


Calcium Acetate (Phoslo)  1,334 mg PO WM [facility]


   Last Admin: 01/11/19 17:17 Dose:  1,334 mg


Darbepoetin Kevin (Aranesp)  100 mcg IVP QWK [facility]


   Last Admin: 01/11/19 09:39 Dose:  100 mcg


Dextrose (Dextrose 50% Inj)  0 ml IV STAT PRN; Protocol


   PRN Reason: Hypoglycemia Protocol


Ergocalciferol (Drisdol 50,000 Intl Units Cap)  1 cap PO Q7D [facility]


   Last Admin: 01/10/19 11:41 Dose:  1 cap


Heparin Sodium (Porcine) (Heparin)  2,000 units IVP TTS [facility]; Protocol


Dextrose (Dextrose 5% In Water 1000 Ml)  1,000 mls @ 0 mls/hr IV .Q0M PRN; 

Protocol


   PRN Reason: Hypoglycemia Protocol


Iron Sucrose 100 mg/ Sodium (Chloride)  105 mls @ 210 mls/hr IVPB DAILY [facility]


   Stop: 01/20/19 11:16


   Last Admin: 01/11/19 13:08 Dose:  210 mls/hr


Insulin Human Lispro (Humalog Low)  0 units SC ACHS [facility]; Protocol


   Last Admin: 01/11/19 17:18 Dose:  2 unit


Labetalol HCl (Trandate)  200 mg PO BID [facility]


   Last Admin: 01/11/19 17:17 Dose:  200 mg


Pantoprazole Sodium (Protonix Ec Tab)  40 mg PO ACB [facility]


Vitamin B Complex/Vit C/Folic Acid (Nephro-Dave)  1 tab PO 0800 [facility]


   Last Admin: 01/11/19 08:58 Dose:  1 tab











- Labs


Labs: 


                                        





                                 01/11/19 05:40 





                                 01/11/19 05:40 





                                        











PT  12.5 SECONDS (9.4-12.5)   01/09/19  20:30    


 


INR  1.09   01/09/19  20:30    


 


APTT  30.8 Seconds (25.1-36.5)   01/09/19  20:30    














Attending/Attestation





- Attestation


I have personally seen and examined this patient.: Yes


I have fully participated in the care of the patient.: Yes


I have reviewed all pertinent clinical information, including history, physical 

exam and plan: Yes


Notes (Text): 





01/11/19 18:22





Attending note;


Patient seen and examined with resident in ICU.


Getting hemodialysis today.


Denies any chest pain, shortness of breath.


Denies any cough or sputum production.


Denies any hematemesis, hemoptysis.





Denies any fevers, chills.


Tolerating diet well.





Patient is a 53-year-old male with  PMH of DMII, HTN, CKD on hemodialysis Tue, 

Thurs, Sat, noncompliant with HD treatment, hepatic hemangioma presents with SOB

and cough that started 1 month ago. 





1. Shortness of breath / volume overload; secondary to noncompliance with 

dialysis.


Patient was supposed to follow-up with hemodialysis unit at [place] for 

Tuesday Thursday and Saturday.


Patient did not receive any outpatient hemodialysis.


Currently admitted with hyperkalemia, shortness of breath and uremia.


Patient got hemodialysis yesterday.


We will continue hemodialysis today.


No need for dialysis explained to the patient in detail.


Nephrology evaluation appreciated.


2. Anemia; chronic. No active bleeding noted. Continue IV iron and Epogen.


3. GI evaluation appreciated; needs outpatient workup.


4. History of hypertension / mitral regurgitation;  continue labetalol on 

Norvasc .currently clinically stable. Needs repeat echocardiogram in few weeks.





Case discussed with  in detail.


Information given by the  to get Medicaid.








Upon discharge the patient will follow-up with [facility] clinic.


Patient needs outpatient hemodialysis.





Diagnosis, follow-up plan discussed with patient in detail.

## 2019-01-11 NOTE — PN
DATE:  01/11/2019



SUBJECTIVE:  I saw Mr. Jerome this morning.  He is a 53-year-old black

male, known to this consultant with a past medical history of diabetes,

chronic renal disease with severe insufficiency, hypertension, anemia,

admitted with complaints of shortness of breath and cough.  At the bedside

this morning, the patient denied any hematemesis, rectal bleeding,

abdominal pain, chest pain, severe heartburn, bleed of any sort.  He is

handling his meals fine.  I reviewed this issue with the nurse in the unit.

Denied any rectal bleeding as well.  He is in the process of undergoing

dialysis treatment for treatment of CHF, renal failure.  The abdomen also

feels less distended.  He feels a lot better including decreased shortness

of breath.



PHYSICAL EXAMINATION:

VITAL SIGNS:  I reviewed this patient's vital signs.

HEENT:  Noncontributory.

LUNGS:  Decreased breath sounds, basilar one quarter of the way up

bilaterally.

HEART:  Irregular.

ABDOMEN:  Protuberant and soft, is less distended than yesterday.  There is

no tenderness elicited in any quadrant.



LABORATORY DATA:  Review of laboratory indicates as of yesterday morning, H

and H 7/21.  Serology negative with creatinine as of yesterday 9.6.



I will review the notes of Dr. Dennis as well as Dr. Anton.



ASSESSMENT AND PLAN:  This is a 53-year-old black male with a renal

failure, presently undergoing dialysis treatment.  Most of the issues

related to this patient, especially anemia are related to renal disease.  There

is no acute evidence of any kind of GI bleeding.  I reviewed the use of

proton pump inhibitor with this patient as well as antireflux portions on

multiple occasions.  At this time point I do not believe any endoscopic

evaluation is warranted.  This can always be done as an

outpatient.  I think his acute issue right now is renal, and will be followed up

the Renal Service as well as Medicine this morning.  I will sign

off the case today.







__________________________________________

Johnny Olivera DO, PhD





DD:  01/11/2019 5:10:04

DT:  01/11/2019 7:17:31

Job # [id number]

LAURENT

## 2019-01-11 NOTE — CP.PCM.PN
Subjective





- Date & Time of Evaluation


Date of Evaluation: 01/11/19


Time of Evaluation: 13:40





- Subjective


Subjective: 





Nephrology Consultation Note: 





Assessment: stable


Uremia, missed HD/non-compliance, hyperkalemia, fluid overload, HTN emergency


Diabetic chronic Kidney Disease (E11.22) 


Hypertensive Chronic Kidney Disease (I12.0)


End stage renal disease (N18.6) dependence on hemodialysis (Z99.2) via PC


Anemia (D64.9), Hyperphosphatemia (E83.39), Secondary Hyperparathyroidism 

(E21.1), HTN (I12.0)





Plan:


Will plan for HD today as ordered. Will plan for dialysis tomorrow. Continue 

with Nephrovite 1 tab/day. 


PRBC as needed for anemia. on GIA with dialysis as last Hb 7.5 TSAT 24% Ferritin

94, added 1 gram IV iron loading dose


Continue with phos binders, last phos level 8.4


discontinue with calcitriol. last  @ goal. added weekly Vit D as last 

level <12.8


BP control with meds as ordered. Patient not on RAAS blocker as with 

hyperkalemia, hope to add soon. resume norvasc and labetalol. 


Glycemic control, Dialysis consistent diet


Further work up/management as per primary team


Dose meds/antibiotics (if needed) for ESRD status. Avoid fleets enema/magnesium 

based laxatives.


pt was educated about risks of missing HD. 


SW consult for outpt HD placement


vascular surgery consult for AVF placement





Thanks for allowing me to participate in care of your patient. Will follow 

patient with you. Please call if any Qs. had d/w team


Dr Otis Anton


Office: [phone] Cell: [phone] Fax: [phone]





Chief Complaint;feels better


HPI: Pt is a 53 M with hx of ESRD recently started on hemodialysis via 

permacathbut pt non compliant with HD, hadn't received HD since left from 

hospital AMA , chronic anemia, hyperphosphatemia, secondary hyperparathyroidism,

Diabetes Mellitus (30 years), hypertension presented with complaints of 

worsening SOB on exertion and HTN emergency


Renal consult requested for ESRD management. 


pt feels sick





ROS: 


Cardiovascular: No chest pain. 


Pulmonary: improved shortness of breath 


Gastrointestinal: denies abdominal pain No nausea. No vomiting. 


Genitourinary: No pain while urinating. Denies blood in urine. 


All other negative except as mentioned in HPI





Physical Examination:   seen on HD    


General Appearance: Comfortable, in no acute respiratory distress, co-operative 

. obese


Vitals reviewed and noted as below


Head; Atraumatic, normocephalic


ENT: no ulcers no thrush. Tongue is midline. Oropharynx: no rash or ulcers.


EYES: Pupils are equal, round and reactive to light accommodation. Eye muscles 

and extraocular movement intact. Sclera is anicteric.


Neck; supple no lymphadenopathy, no thyromegaly or bruit


Lungs: Normal respiratory rate/effort. Breath sounds bilateral reduced at bases 

clearer


Heart: Normal rate. s1s2 normal. No rub or gallop. 


Extremities: 1+ edema. No varicose veins


Neurological: Patient is alert, awake and oriented to person, place and time. No

focal deficit. Strength bilateral appropriate and equal


Skin: Warm and dry. Normal turgor. No rash. Palpitation: Normal elasticity for 

age


Abdomen: Abdomen is soft. Bowel sounds +. There is no abdominal tenderness, no 

guarding/rigidity or organomegaly


Psych: limited insight and flat affect/mood


MSK: no joint tenderness or swelling. Digits and nails normal, no deformity


: kidney or bladder not palpable


Access: permacath





Labs/imaging reviewed.


Past medical history, past surgical history, family history, social history, all

ergy reviewed and noted as below


Family Hx: no hx of CKD. Non contributory 





Objective





- Vital Signs/Intake and Output


Vital Signs (last 24 hours): 


                                        











Temp Pulse Resp BP Pulse Ox


 


 98.3 F   88   57 H  163/73 H  100 


 


 01/11/19 04:00  01/11/19 12:19  01/11/19 12:15  01/11/19 12:19  01/11/19 06:00








Intake and Output: 


                                        











 01/11/19 01/11/19





 06:59 18:59


 


Output Total 1200 


 


Balance -1200 














- Medications


Medications: 


                               Current Medications





Amlodipine Besylate (Norvasc)  10 mg PO DAILY [facility], [facility]


   Last Admin: 01/11/19 12:19 Dose:  10 mg


Atorvastatin Calcium (Lipitor)  40 mg PO HS [facility]


   Last Admin: 01/10/19 21:50 Dose:  40 mg


Calcium Acetate (Phoslo)  1,334 mg PO WM [facility]


   Last Admin: 01/11/19 13:08 Dose:  1,334 mg


Darbepoetin Iglesia (Aranesp)  100 mcg IVP QWK [facility]


   Last Admin: 01/11/19 09:39 Dose:  100 mcg


Dextrose (Dextrose 50% Inj)  0 ml IV STAT PRN; Protocol


   PRN Reason: Hypoglycemia Protocol


Ergocalciferol (Drisdol 50,000 Intl Units Cap)  1 cap PO Q7D [facility]


   Last Admin: 01/10/19 11:41 Dose:  1 cap


Heparin Sodium (Porcine) (Heparin)  2,000 units IVP TTS [facility]EL[facility]; Protocol


Dextrose (Dextrose 5% In Water 1000 Ml)  1,000 mls @ 0 mls/hr IV .Q0M PRN; 

Protocol


   PRN Reason: Hypoglycemia Protocol


Iron Sucrose 100 mg/ Sodium (Chloride)  105 mls @ 210 mls/hr IVPB DAILY [facility]


   Stop: 01/20/19 11:16


   Last Admin: 01/11/19 13:08 Dose:  210 mls/hr


Insulin Human Lispro (Humalog Low)  0 units SC ACHS [facility]; Protocol


   Last Admin: 01/11/19 08:54 Dose:  Not Given


Labetalol HCl (Trandate)  200 mg PO BID [facility]


   Last Admin: 01/11/19 12:19 Dose:  200 mg


Pantoprazole Sodium (Protonix Ec Tab)  40 mg PO ACB [facility]


Vitamin B Complex/Vit C/Folic Acid (Nephro-Dave)  1 tab PO 0800 [facility]


   Last Admin: 01/11/19 08:58 Dose:  1 tab











- Labs


Labs: 


                                        





                                 01/11/19 05:40 





                                 01/11/19 05:40 





                                        











PT  12.5 SECONDS (9.4-12.5)   01/09/19  20:30    


 


INR  1.09   01/09/19  20:30    


 


APTT  30.8 Seconds (25.1-36.5)   01/09/19  20:30

## 2019-01-11 NOTE — CP.PCM.CON
<Juan Woodall - Last Filed: 01/11/19 11:08>





History of Present Illness





- History of Present Illness


History of Present Illness: 





Surgery: Dr. Clarke





CC: CKD requiring AVF





HPI: 53M with past medical history of DMII, HTN, Hepatic hemangioma, CKD 

requiring dialysis but non-compliant presents to ED for worsening SOB/fatigue x 

1 month 2/2 volume overload and multiple lab derangements 2/2 pts non-compliance

w. HD. Pt was initially seen back in December for AVF access. Pt was told to f

ollow up out pt, however pt ultimately signed out AMA and did not make any f/u 

appointments. Pt seen in ICU, resting comfortably in bed receiving dialysis. He 

states that he feels much better today.





PMH: see above


PSH: nose fx


Meds: MAR reviewed 


NKDA


Social: Former smoker: <1 pack/day x 10 years, social ETOH, no drugs


FHx: Mother: MI (51 y/o), Father: CABG, Daughter: Anemia








Review of Systems





- Review of Systems


All systems: reviewed and no additional remarkable complaints except (HPI)





Past Patient History





- Infectious Disease


Hx of Infectious Diseases: None





- Past Medical History & Family History


Past Medical History?: Yes





- Past Social History


Smoking Status: Former Smoker





- CARDIAC


Hx Cardiac Disorders: Yes


Hx Hypertension: Yes





- PULMONARY


Hx Respiratory Disorders: No





- NEUROLOGICAL


Hx Neurological Disorder: No





- HEENT


Hx HEENT Problems: No





- RENAL


Hx Chronic Kidney Disease: No


Hx Dialysis: Yes


Type of Dialysis Access: R chest cath





- ENDOCRINE/METABOLIC


Hx Diabetes Mellitus Type 1: Yes





- HEMATOLOGICAL/ONCOLOGICAL


Hx Blood Disorders: No





- INTEGUMENTARY


Hx Dermatological Problems: No





- MUSCULOSKELETAL/RHEUMATOLOGICAL


Hx Musculoskeletal Disorders: No





- GASTROINTESTINAL


Hx Gastrointestinal Disorders: No





- GENITOURINARY/GYNECOLOGICAL


Hx Genitourinary Disorders: No





- PSYCHIATRIC


Hx Psychophysiologic Disorder: No


Hx Substance Use: No





- SURGICAL HISTORY


Hx Cardiac Catheterization: Yes





- ANESTHESIA


Hx Anesthesia: No





Meds


Allergies/Adverse Reactions: 


                                    Allergies











Allergy/AdvReac Type Severity Reaction Status Date / Time


 


No Known Allergies Allergy   Verified 01/09/19 20:01














- Medications


Medications: 


                               Current Medications





Amlodipine Besylate (Norvasc)  10 mg PO DAILY [facility]


   Last Admin: 01/10/19 11:42 Dose:  10 mg


Atorvastatin Calcium (Lipitor)  40 mg PO HS [facility]


   Last Admin: 01/10/19 21:50 Dose:  40 mg


Calcium Acetate (Phoslo)  1,334 mg PO WM [facility]


   Last Admin: 01/11/19 08:58 Dose:  1,334 mg


Darbepoetin Iglesia (Aranesp)  100 mcg IVP QWK [facility]


   Last Admin: 01/11/19 09:39 Dose:  100 mcg


Dextrose (Dextrose 50% Inj)  0 ml IV STAT PRN; Protocol


   PRN Reason: Hypoglycemia Protocol


Ergocalciferol (Drisdol 50,000 Intl Units Cap)  1 cap PO Q7D [facility]


   Last Admin: 01/10/19 11:41 Dose:  1 cap


Heparin Sodium (Porcine) (Heparin)  2,000 units IVP TTS LELO; Protocol


Dextrose (Dextrose 5% In Water 1000 Ml)  1,000 mls @ 0 mls/hr IV .Q0M PRN; 

Protocol


   PRN Reason: Hypoglycemia Protocol


Iron Sucrose 100 mg/ Sodium (Chloride)  105 mls @ 210 mls/hr IVPB DAILY [facility]


   Stop: 01/20/19 11:16


   Last Admin: 01/10/19 14:40 Dose:  210 mls/hr


Insulin Human Lispro (Humalog Low)  0 units SC ACHS [facility]; Protocol


   Last Admin: 01/11/19 08:54 Dose:  Not Given


Labetalol HCl (Trandate)  200 mg PO BID [facility]


   Last Admin: 01/10/19 17:19 Dose:  200 mg


Pantoprazole Sodium (Protonix Inj)  40 mg IVP DAILY [facility]


   Last Admin: 01/10/19 11:43 Dose:  40 mg


Vitamin B Complex/Vit C/Folic Acid (Nephro-Dave)  1 tab PO 0800 [facility]


   Last Admin: 01/11/19 08:58 Dose:  1 tab











Physical Exam





- Constitutional


Appears: Non-toxic, No Acute Distress





- Head Exam


Head Exam: ATRAUMATIC, NORMOCEPHALIC





- Eye Exam


Eye Exam: EOMI





- ENT Exam


ENT Exam: Mucous Membranes Moist





- Neck Exam


Neck exam: Positive for: Full Rom





- Respiratory Exam


Respiratory Exam: NORMAL BREATHING PATTERN.  absent: Accessory Muscle Use, 

Respiratory Distress





- GI/Abdominal Exam


GI & Abdominal Exam: Soft.  absent: Distended, Firm, Guarding, Rebound, Rigid, 

Tenderness





- Extremities Exam


Extremities exam: Positive for: pedal pulses present.  Negative for: calf 

tenderness





- Neurological Exam


Neurological exam: Alert, Oriented x3





- Psychiatric Exam


Psychiatric exam: Normal Affect, Normal Mood





Results





- Vital Signs


Recent Vital Signs: 


                                Last Vital Signs











Temp  98.3 F   01/11/19 04:00


 


Pulse  83   01/11/19 09:30


 


Resp  10 L  01/11/19 09:30


 


BP  158/78 H  01/11/19 09:30


 


Pulse Ox  100   01/11/19 06:00














- Labs


Result Diagrams: 


                                 01/11/19 05:40





                                 01/11/19 05:40


Labs: 


                         Laboratory Results - last 24 hr











  01/10/19 01/10/19 01/10/19





  05:20 07:38 08:30


 


WBC   


 


RBC   


 


Hgb   


 


Hct   


 


MCV   


 


MCH   


 


MCHC   


 


RDW   


 


Plt Count   


 


MPV   


 


Gran %   


 


Lymph % (Auto)   


 


Mono % (Auto)   


 


Eos % (Auto)   


 


Baso % (Auto)   


 


Gran #   


 


Lymph # (Auto)   


 


Mono # (Auto)   


 


Eos # (Auto)   


 


Baso # (Auto)   


 


Sodium   


 


Potassium   


 


Chloride   


 


Carbon Dioxide   


 


Anion Gap   


 


BUN   


 


Creatinine   


 


Est GFR ( Amer)   


 


Est GFR (Non-Af Amer)   


 


POC Glucose (mg/dL)   221 H 


 


Random Glucose   


 


Calcium   


 


Total Bilirubin   


 


AST   


 


ALT   


 


Alkaline Phosphatase   


 


Total Creatine Kinase   


 


CK-MB (CK-2)   


 


CK-MB (CK-2) %   


 


Troponin I   


 


Total Protein   


 


Albumin   


 


Globulin   


 


Albumin/Globulin Ratio   


 


Procalcitonin  0.24  


 


Urine Color   


 


Urine Appearance   


 


Urine pH   


 


Ur Specific Gravity   


 


Urine Protein   


 


Urine Glucose (UA)   


 


Urine Ketones   


 


Urine Blood   


 


Urine Nitrate   


 


Urine Bilirubin   


 


Urine Urobilinogen   


 


Ur Leukocyte Esterase   


 


Urine RBC   


 


Urine WBC   


 


Ur Epithelial Cells   


 


Urine Bacteria   


 


Urine Other   


 


Urine Opiates Screen   


 


Urine Methadone Screen   


 


Ur Barbiturates Screen   


 


Ur Phencyclidine Scrn   


 


Ur Amphetamines Screen   


 


U Benzodiazepines Scrn   


 


U Oth Cocaine Metabols   


 


U Cannabinoids Screen   


 


Hep Bs Antigen    Negative


 


Hep Bs Antibody   


 


Hep B Core IgM Ab    Negative














  01/10/19 01/10/19 01/10/19





  08:30 10:20 10:20


 


WBC   


 


RBC   


 


Hgb   


 


Hct   


 


MCV   


 


MCH   


 


MCHC   


 


RDW   


 


Plt Count   


 


MPV   


 


Gran %   


 


Lymph % (Auto)   


 


Mono % (Auto)   


 


Eos % (Auto)   


 


Baso % (Auto)   


 


Gran #   


 


Lymph # (Auto)   


 


Mono # (Auto)   


 


Eos # (Auto)   


 


Baso # (Auto)   


 


Sodium   


 


Potassium   


 


Chloride   


 


Carbon Dioxide   


 


Anion Gap   


 


BUN   


 


Creatinine   


 


Est GFR ( Amer)   


 


Est GFR (Non-Af Amer)   


 


POC Glucose (mg/dL)   


 


Random Glucose   


 


Calcium   


 


Total Bilirubin   


 


AST   


 


ALT   


 


Alkaline Phosphatase   


 


Total Creatine Kinase   5698 H 


 


CK-MB (CK-2)   13.2 H 


 


CK-MB (CK-2) %   0.2 L 


 


Troponin I    0.03  D


 


Total Protein   


 


Albumin   


 


Globulin   


 


Albumin/Globulin Ratio   


 


Procalcitonin   


 


Urine Color   


 


Urine Appearance   


 


Urine pH   


 


Ur Specific Gravity   


 


Urine Protein   


 


Urine Glucose (UA)   


 


Urine Ketones   


 


Urine Blood   


 


Urine Nitrate   


 


Urine Bilirubin   


 


Urine Urobilinogen   


 


Ur Leukocyte Esterase   


 


Urine RBC   


 


Urine WBC   


 


Ur Epithelial Cells   


 


Urine Bacteria   


 


Urine Other   


 


Urine Opiates Screen   


 


Urine Methadone Screen   


 


Ur Barbiturates Screen   


 


Ur Phencyclidine Scrn   


 


Ur Amphetamines Screen   


 


U Benzodiazepines Scrn   


 


U Oth Cocaine Metabols   


 


U Cannabinoids Screen   


 


Hep Bs Antigen   


 


Hep Bs Antibody  Negative  


 


Hep B Core IgM Ab   














  01/10/19 01/10/19 01/10/19





  11:10 13:00 13:43


 


WBC   


 


RBC   


 


Hgb   


 


Hct   


 


MCV   


 


MCH   


 


MCHC   


 


RDW   


 


Plt Count   


 


MPV   


 


Gran %   


 


Lymph % (Auto)   


 


Mono % (Auto)   


 


Eos % (Auto)   


 


Baso % (Auto)   


 


Gran #   


 


Lymph # (Auto)   


 


Mono # (Auto)   


 


Eos # (Auto)   


 


Baso # (Auto)   


 


Sodium   135 


 


Potassium   3.8 


 


Chloride   100 


 


Carbon Dioxide   25 


 


Anion Gap   14 


 


BUN   53 H 


 


Creatinine   9.6 H* D 


 


Est GFR ( Amer)   7 


 


Est GFR (Non-Af Amer)   6 


 


POC Glucose (mg/dL)  137 H   211 H


 


Random Glucose   185 H 


 


Calcium   7.1 L 


 


Total Bilirubin   


 


AST   


 


ALT   


 


Alkaline Phosphatase   


 


Total Creatine Kinase   


 


CK-MB (CK-2)   


 


CK-MB (CK-2) %   


 


Troponin I   


 


Total Protein   


 


Albumin   


 


Globulin   


 


Albumin/Globulin Ratio   


 


Procalcitonin   


 


Urine Color   


 


Urine Appearance   


 


Urine pH   


 


Ur Specific Gravity   


 


Urine Protein   


 


Urine Glucose (UA)   


 


Urine Ketones   


 


Urine Blood   


 


Urine Nitrate   


 


Urine Bilirubin   


 


Urine Urobilinogen   


 


Ur Leukocyte Esterase   


 


Urine RBC   


 


Urine WBC   


 


Ur Epithelial Cells   


 


Urine Bacteria   


 


Urine Other   


 


Urine Opiates Screen   


 


Urine Methadone Screen   


 


Ur Barbiturates Screen   


 


Ur Phencyclidine Scrn   


 


Ur Amphetamines Screen   


 


U Benzodiazepines Scrn   


 


U Oth Cocaine Metabols   


 


U Cannabinoids Screen   


 


Hep Bs Antigen   


 


Hep Bs Antibody   


 


Hep B Core IgM Ab   














  01/10/19 01/10/19 01/11/19





  16:17 21:18 05:40


 


WBC    9.8


 


RBC    2.58 L


 


Hgb    7.5 L


 


Hct    21.6 L


 


MCV    83.7


 


MCH    29.1


 


MCHC    34.7


 


RDW    13.0


 


Plt Count    215


 


MPV    9.9


 


Gran %    74.5 H


 


Lymph % (Auto)    14.1 L


 


Mono % (Auto)    7.4 H


 


Eos % (Auto)    3.8


 


Baso % (Auto)    0.2


 


Gran #    7.27 H


 


Lymph # (Auto)    1.4


 


Mono # (Auto)    0.7 H


 


Eos # (Auto)    0.4


 


Baso # (Auto)    0.02


 


Sodium   


 


Potassium   


 


Chloride   


 


Carbon Dioxide   


 


Anion Gap   


 


BUN   


 


Creatinine   


 


Est GFR ( Amer)   


 


Est GFR (Non-Af Amer)   


 


POC Glucose (mg/dL)  152 H  179 H 


 


Random Glucose   


 


Calcium   


 


Total Bilirubin   


 


AST   


 


ALT   


 


Alkaline Phosphatase   


 


Total Creatine Kinase   


 


CK-MB (CK-2)   


 


CK-MB (CK-2) %   


 


Troponin I   


 


Total Protein   


 


Albumin   


 


Globulin   


 


Albumin/Globulin Ratio   


 


Procalcitonin   


 


Urine Color   


 


Urine Appearance   


 


Urine pH   


 


Ur Specific Gravity   


 


Urine Protein   


 


Urine Glucose (UA)   


 


Urine Ketones   


 


Urine Blood   


 


Urine Nitrate   


 


Urine Bilirubin   


 


Urine Urobilinogen   


 


Ur Leukocyte Esterase   


 


Urine RBC   


 


Urine WBC   


 


Ur Epithelial Cells   


 


Urine Bacteria   


 


Urine Other   


 


Urine Opiates Screen   


 


Urine Methadone Screen   


 


Ur Barbiturates Screen   


 


Ur Phencyclidine Scrn   


 


Ur Amphetamines Screen   


 


U Benzodiazepines Scrn   


 


U Oth Cocaine Metabols   


 


U Cannabinoids Screen   


 


Hep Bs Antigen   


 


Hep Bs Antibody   


 


Hep B Core IgM Ab   














  01/11/19 01/11/19 01/11/19





  05:40 06:00 06:00


 


WBC   


 


RBC   


 


Hgb   


 


Hct   


 


MCV   


 


MCH   


 


MCHC   


 


RDW   


 


Plt Count   


 


MPV   


 


Gran %   


 


Lymph % (Auto)   


 


Mono % (Auto)   


 


Eos % (Auto)   


 


Baso % (Auto)   


 


Gran #   


 


Lymph # (Auto)   


 


Mono # (Auto)   


 


Eos # (Auto)   


 


Baso # (Auto)   


 


Sodium  135  


 


Potassium  4.8  


 


Chloride  99  


 


Carbon Dioxide  28  


 


Anion Gap  14  


 


BUN  60 H  


 


Creatinine  10.7 H*  


 


Est GFR ( Amer)  6  


 


Est GFR (Non-Af Amer)  5  


 


POC Glucose (mg/dL)   


 


Random Glucose  95  


 


Calcium  6.6 L*  


 


Total Bilirubin  0.6  


 


AST  47  


 


ALT  70 H  


 


Alkaline Phosphatase  227 H  


 


Total Creatine Kinase   


 


CK-MB (CK-2)   


 


CK-MB (CK-2) %   


 


Troponin I   


 


Total Protein  6.4  


 


Albumin  3.1  


 


Globulin  3.3  


 


Albumin/Globulin Ratio  0.9 L  


 


Procalcitonin   


 


Urine Color   Yellow 


 


Urine Appearance   Sl cloudy 


 


Urine pH   6.5 


 


Ur Specific Gravity   1.020 


 


Urine Protein   >=300 H 


 


Urine Glucose (UA)   100 H 


 


Urine Ketones   Negative 


 


Urine Blood   Moderate H 


 


Urine Nitrate   Negative 


 


Urine Bilirubin   Negative 


 


Urine Urobilinogen   0.2 


 


Ur Leukocyte Esterase   Small H 


 


Urine RBC   1 - 3 H 


 


Urine WBC   10 - 15 H 


 


Ur Epithelial Cells   0 - 2 


 


Urine Bacteria   Few 


 


Urine Other   Usperm 


 


Urine Opiates Screen    Negative


 


Urine Methadone Screen    Negative


 


Ur Barbiturates Screen    Negative


 


Ur Phencyclidine Scrn    Negative


 


Ur Amphetamines Screen    Negative


 


U Benzodiazepines Scrn    Negative


 


U Oth Cocaine Metabols    Negative


 


U Cannabinoids Screen    Negative


 


Hep Bs Antigen   


 


Hep Bs Antibody   


 


Hep B Core IgM Ab   














  01/11/19





  07:44


 


WBC 


 


RBC 


 


Hgb 


 


Hct 


 


MCV 


 


MCH 


 


MCHC 


 


RDW 


 


Plt Count 


 


MPV 


 


Gran % 


 


Lymph % (Auto) 


 


Mono % (Auto) 


 


Eos % (Auto) 


 


Baso % (Auto) 


 


Gran # 


 


Lymph # (Auto) 


 


Mono # (Auto) 


 


Eos # (Auto) 


 


Baso # (Auto) 


 


Sodium 


 


Potassium 


 


Chloride 


 


Carbon Dioxide 


 


Anion Gap 


 


BUN 


 


Creatinine 


 


Est GFR ( Amer) 


 


Est GFR (Non-Af Amer) 


 


POC Glucose (mg/dL)  117 H


 


Random Glucose 


 


Calcium 


 


Total Bilirubin 


 


AST 


 


ALT 


 


Alkaline Phosphatase 


 


Total Creatine Kinase 


 


CK-MB (CK-2) 


 


CK-MB (CK-2) % 


 


Troponin I 


 


Total Protein 


 


Albumin 


 


Globulin 


 


Albumin/Globulin Ratio 


 


Procalcitonin 


 


Urine Color 


 


Urine Appearance 


 


Urine pH 


 


Ur Specific Gravity 


 


Urine Protein 


 


Urine Glucose (UA) 


 


Urine Ketones 


 


Urine Blood 


 


Urine Nitrate 


 


Urine Bilirubin 


 


Urine Urobilinogen 


 


Ur Leukocyte Esterase 


 


Urine RBC 


 


Urine WBC 


 


Ur Epithelial Cells 


 


Urine Bacteria 


 


Urine Other 


 


Urine Opiates Screen 


 


Urine Methadone Screen 


 


Ur Barbiturates Screen 


 


Ur Phencyclidine Scrn 


 


Ur Amphetamines Screen 


 


U Benzodiazepines Scrn 


 


U Oth Cocaine Metabols 


 


U Cannabinoids Screen 


 


Hep Bs Antigen 


 


Hep Bs Antibody 


 


Hep B Core IgM Ab 














Assessment & Plan





- Assessment and Plan (Free Text)


Assessment: 





53M R hand dominant w. CKD requiring AVF access, currently w. IV in [facility]





Plan: 





-Vein mapping from prior admission reviewed, adequate vasculature present B/L


-Pt currently has IV access in [facility] which would be ideal site for AVF placement, 

will place order for L arm precautions, recommend that pt f/u out pt to schedule

elective AVF


-will sign off, case d/w attending, please re-consult as needed





Trinhitis PGY4





<Darin Clarke - Last Filed: 01/20/19 12:05>





Results





- Vital Signs


Recent Vital Signs: 


                                Last Vital Signs











Temp  98.9 F   01/16/19 14:00


 


Pulse  86   01/16/19 17:34


 


Resp  18   01/16/19 14:00


 


BP  145/73   01/16/19 17:34


 


Pulse Ox  99   01/16/19 14:00














- Labs


Result Diagrams: 


                                 01/16/19 08:04





                                 01/16/19 08:04





Assessment & Plan





- Assessment and Plan (Free Text)


Plan: 


Patient was seen, examined and evaluated by me. I agree with resident's 

assessment and plan.

## 2019-01-12 NOTE — CP.PCM.PN
<Serafin Bull - Last Filed: 01/12/19 13:42>





Subjective





- Date & Time of Evaluation


Date of Evaluation: 01/12/19


Time of Evaluation: 13:21





- Subjective


Subjective: 





Javier lM PGY2 - Progress Note





Patient seen and examined this AM in dialysis unit. Patient without complaints, 

decreased lower extremity swelling. Patient denies chest pain, shortness of 

breath, abdominal pain, numbness, focal neurological deficits. Discussion had 

with patient regarding future dialysis plan and outpatient follow up with 

nephrologist and renal transplant. 





Objective





- Vital Signs/Intake and Output


Vital Signs (last 24 hours): 


                                        











Temp Pulse Resp BP Pulse Ox


 


 98.4 F   89   20   164/76 H  97 


 


 01/12/19 06:00  01/12/19 06:00  01/12/19 06:00  01/12/19 06:00  01/12/19 06:00











- Medications


Medications: 


                               Current Medications





Amlodipine Besylate (Norvasc)  10 mg PO DAILY [facility]


   Last Admin: 01/12/19 11:16 Dose:  Not Given


Atorvastatin Calcium (Lipitor)  40 mg PO HS [facility]


   Last Admin: 01/11/19 22:00 Dose:  40 mg


Calcium Acetate (Phoslo)  1,334 mg PO WM [facility]


   Last Admin: 01/12/19 08:36 Dose:  Not Given


Darbepoetin Iglesia (Aranesp)  100 mcg IVP QWK [facility]


   Last Admin: 01/11/19 09:39 Dose:  100 mcg


Dextrose (Dextrose 50% Inj)  0 ml IV STAT PRN; Protocol


   PRN Reason: Hypoglycemia Protocol


Ergocalciferol (Drisdol 50,000 Intl Units Cap)  1 cap PO Q7D [facility]


   Last Admin: 01/10/19 11:41 Dose:  1 cap


Furosemide (Lasix)  80 mg PO MWF [facility]


Heparin Sodium (Porcine) (Heparin)  2,000 units IVP TTS [facility]; Protocol


Dextrose (Dextrose 5% In Water 1000 Ml)  1,000 mls @ 0 mls/hr IV .Q0M PRN; 

Protocol


   PRN Reason: Hypoglycemia Protocol


Iron Sucrose 100 mg/ Sodium (Chloride)  105 mls @ 210 mls/hr IVPB DAILY [facility]


   Stop: 01/20/19 11:16


   Last Admin: 01/11/19 13:08 Dose:  210 mls/hr


Insulin Human Lispro (Humalog Low)  0 units SC ACHS [facility]; Protocol


   Last Admin: 01/12/19 12:57 Dose:  Not Given


Labetalol HCl (Trandate)  200 mg PO BID [facility]


   Last Admin: 01/12/19 11:16 Dose:  Not Given


Losartan Potassium (Cozaar)  50 mg PO QPM [facility]


Pantoprazole Sodium (Protonix Ec Tab)  40 mg PO ACB [facility]


   Last Admin: 01/12/19 08:37 Dose:  Not Given


Vitamin B Complex/Vit C/Folic Acid (Nephro-Dave)  1 tab PO 0800 [facility]


   Last Admin: 01/12/19 08:36 Dose:  Not Given











- Labs


Labs: 


                                        





                                 01/12/19 05:00 





                                 01/12/19 05:00 





                                        











PT  12.5 SECONDS (9.4-12.5)   01/09/19  20:30    


 


INR  1.09   01/09/19  20:30    


 


APTT  30.8 Seconds (25.1-36.5)   01/09/19  20:30    














- Constitutional


Appears: No Acute Distress





- Head Exam


Head Exam: ATRAUMATIC, NORMAL INSPECTION, NORMOCEPHALIC





- Eye Exam


Eye Exam: EOMI, PERRL





- ENT Exam


ENT Exam: Mucous Membranes Moist





- Neck Exam


Neck Exam: Full ROM





- Respiratory Exam


Respiratory Exam: Clear to Ausculation Bilateral, NORMAL BREATHING PATTERN





- Cardiovascular Exam


Cardiovascular Exam: REGULAR RHYTHM, +S1, +S2





- GI/Abdominal Exam


GI & Abdominal Exam: Soft, Normal Bowel Sounds.  absent: Firm, Guarding, Rigid, 

Tenderness





- Extremities Exam


Extremities Exam: Full ROM.  absent: Pedal Edema





- Neurological Exam


Neurological Exam: Alert, Awake, Normal Gait, Oriented x3





- Psychiatric Exam


Psychiatric exam: Normal Affect, Normal Mood





- Skin


Skin Exam: Dry, Intact





Assessment and Plan





- Assessment and Plan (Free Text)


Assessment: 





53 year old male with past medical history of HTN, DM2, unspecified renal mass, 

hepatic hemangioma CKD on HD originally scheduled with T/TH/SA with poor 

compliance who presents to [facility] ED for fatigue, SHOB, fluid accumulation, 

productive cough found to be be volume overloaded secondary to poor compliance 

with HD, medication, and diet. Patient was stabilized in ICU underwent HD and 

was transferred out to med/surg. Patient continues to undergo HD while in 

Hospital while outpatient HD can be set up. 


Plan: 





Shortness of breath / volume overload; secondary to noncompliance with dialysis


- Etiology was failure for outpatient hemodialysis due to non-compliance


- Nephrology consulted and following





CKD Stage V requiring HD


-Nephrology consulted and following, follow recs


-HD on admission and throughout hospital stay


-Outpatient HD in process of being set up





Anemia


-Likely etiology is chronic disease procress


-Continue IV Fe and Epogen


-Nephrology consulted


-Maintain Hgb >7.0


-Attempt to avoid un-necessary transfusions due to potential side effects for 

prolonging or altering renal transplant timeline





History of hypertension / mitral regurgitation


- continue labetalol and Norvasc


- Echo cardiogram from 11/2018 reviewed and appreciated


- Repeat echo in one month for re-evaluation 





DM2


-ISS


-ACHS


-Maintain euglycemia 





HLD


-Continue lipitor





GI/DVT ppx


-Protonix


-Heparin 





Renal Diet





Patient seen, case and plan discussed with attending Dr. Lee 





<Khadra Lee - Last Filed: 01/12/19 14:11>





Objective





- Vital Signs/Intake and Output


Vital Signs (last 24 hours): 


                                        











Temp Pulse Resp BP Pulse Ox


 


 98.4 F   89   20   164/76 H  97 


 


 01/12/19 06:00  01/12/19 06:00  01/12/19 06:00  01/12/19 06:00  01/12/19 06:00











- Medications


Medications: 


                               Current Medications





Amlodipine Besylate (Norvasc)  10 mg PO DAILY [facility]


   Last Admin: 01/12/19 11:16 Dose:  Not Given


Atorvastatin Calcium (Lipitor)  40 mg PO HS [facility]


   Last Admin: 01/11/19 22:00 Dose:  40 mg


Calcium Acetate (Phoslo)  1,334 mg PO WM [facility]


   Last Admin: 01/12/19 08:36 Dose:  Not Given


Darbepoetin Igleisa (Aranesp)  100 mcg IVP QWK [facility]


   Last Admin: 01/11/19 09:39 Dose:  100 mcg


Dextrose (Dextrose 50% Inj)  0 ml IV STAT PRN; Protocol


   PRN Reason: Hypoglycemia Protocol


Ergocalciferol (Drisdol 50,000 Intl Units Cap)  1 cap PO Q7D [facility]


   Last Admin: 01/10/19 11:41 Dose:  1 cap


Furosemide (Lasix)  80 mg PO MWF [facility]


Heparin Sodium (Porcine) (Heparin)  2,000 units IVP TTS [facility]; Protocol


Dextrose (Dextrose 5% In Water 1000 Ml)  1,000 mls @ 0 mls/hr IV .Q0M PRN; 

Protocol


   PRN Reason: Hypoglycemia Protocol


Iron Sucrose 100 mg/ Sodium (Chloride)  105 mls @ 210 mls/hr IVPB DAILY [facility]


   Stop: 01/20/19 11:16


   Last Admin: 01/11/19 13:08 Dose:  210 mls/hr


Insulin Human Lispro (Humalog Low)  0 units SC ACHS [facility]; Protocol


   Last Admin: 01/12/19 12:57 Dose:  Not Given


Labetalol HCl (Trandate)  200 mg PO BID [facility]


   Last Admin: 01/12/19 11:16 Dose:  Not Given


Losartan Potassium (Cozaar)  50 mg PO QPM [facility]


Pantoprazole Sodium (Protonix Ec Tab)  40 mg PO ACB [facility]


   Last Admin: 01/12/19 08:37 Dose:  Not Given


Vitamin B Complex/Vit C/Folic Acid (Nephro-Dave)  1 tab PO 0800 [facility]


   Last Admin: 01/12/19 08:36 Dose:  Not Given











- Labs


Labs: 


                                        





                                 01/12/19 05:00 





                                 01/12/19 05:00 





                                        











PT  12.5 SECONDS (9.4-12.5)   01/09/19  20:30    


 


INR  1.09   01/09/19  20:30    


 


APTT  30.8 Seconds (25.1-36.5)   01/09/19  20:30    














Attending/Attestation





- Attestation


I have personally seen and examined this patient.: Yes


I have fully participated in the care of the patient.: Yes


I have reviewed all pertinent clinical information, including history, physical 

exam and plan: Yes


Notes (Text): 





01/12/19 14:08








Attending note;





Patient seen and examined with resident in dialysis unit.


Nephrologist by the bedside.


Getting hemodialysis today.


Denies any chest pain, shortness of breath.


Denies any cough or sputum production.


Denies any hematemesis, hemoptysis.


Leg swelling resolved.








Patient is a 53-year-old male with  PMH of DMII, HTN, CKD on hemodialysis Tue, 

Thurs, Sat, noncompliant with HD treatment, hepatic hemangioma presents with SOB

and cough that started 1 month ago. 





1. Shortness of breath / volume overload; secondary to noncompliance with 

dialysis.


Patient was dialyzed on 1/10 and 1/11.


Patient is getting dialysis today.


Volume overload resolved. Leg swelling improved.


Shortness of breath improved.


Nephrology evaluation appreciated.


 need for dialysis explained in detail.


Patient agreed to continue dialysis.


2. Anemia; chronic. No active bleeding noted. Continue IV iron and Epogen. 

Hemoglobin improved to 7.9.


3. GI evaluation appreciated; needs outpatient workup.


4. History of hypertension / mitral regurgitation;  continue labetalol on 

Norvasc .currently clinically stable. Needs repeat echocardiogram in few weeks.


5. Surgery evaluation appreciated. Tentative AV fistula placement on Monday. We 

will follow up closely.





Case discussed with  in detail.


Information given by the  to get Medicaid.





Upon discharge the patient will follow-up with [facility] clinic.


Patient needs outpatient hemodialysis.





Diagnosis, follow-up plan discussed with patient in detail.

## 2019-01-12 NOTE — CP.PCM.PN
<Juan Woodall - Last Filed: 01/12/19 13:35>





Subjective





- Date & Time of Evaluation


Date of Evaluation: 01/12/19


Time of Evaluation: 13:35





- Subjective


Subjective: 





Surgery: Dr. Clarke





Pt seen and examined. Downgraded from ICU. Pt is feeling much better today. No 

complaints.





Objective





- Vital Signs/Intake and Output


Vital Signs (last 24 hours): 


                                        











Temp Pulse Resp BP Pulse Ox


 


 98.4 F   89   20   164/76 H  97 


 


 01/12/19 06:00  01/12/19 06:00  01/12/19 06:00  01/12/19 06:00  01/12/19 06:00











- Medications


Medications: 


                               Current Medications





Amlodipine Besylate (Norvasc)  10 mg PO DAILY [facility]


   Last Admin: 01/12/19 11:16 Dose:  Not Given


Atorvastatin Calcium (Lipitor)  40 mg PO HS [facility]


   Last Admin: 01/11/19 22:00 Dose:  40 mg


Calcium Acetate (Phoslo)  1,334 mg PO WM [facility]


   Last Admin: 01/12/19 08:36 Dose:  Not Given


Darbepoetin Iglesia (Aranesp)  100 mcg IVP QWK [facility]


   Last Admin: 01/11/19 09:39 Dose:  100 mcg


Dextrose (Dextrose 50% Inj)  0 ml IV STAT PRN; Protocol


   PRN Reason: Hypoglycemia Protocol


Ergocalciferol (Drisdol 50,000 Intl Units Cap)  1 cap PO Q7D [facility]


   Last Admin: 01/10/19 11:41 Dose:  1 cap


Furosemide (Lasix)  80 mg PO MWF [facility]


Heparin Sodium (Porcine) (Heparin)  2,000 units IVP TTS [facility]; Protocol


Dextrose (Dextrose 5% In Water 1000 Ml)  1,000 mls @ 0 mls/hr IV .Q0M PRN; 

Protocol


   PRN Reason: Hypoglycemia Protocol


Iron Sucrose 100 mg/ Sodium (Chloride)  105 mls @ 210 mls/hr IVPB DAILY [facility]


   Stop: 01/20/19 11:16


   Last Admin: 01/11/19 13:08 Dose:  210 mls/hr


Insulin Human Lispro (Humalog Low)  0 units SC ACHS [facility]; Protocol


   Last Admin: 01/12/19 12:57 Dose:  Not Given


Labetalol HCl (Trandate)  200 mg PO BID [facility]


   Last Admin: 01/12/19 11:16 Dose:  Not Given


Losartan Potassium (Cozaar)  50 mg PO QPM [facility]


Pantoprazole Sodium (Protonix Ec Tab)  40 mg PO ACB [facility]


   Last Admin: 01/12/19 08:37 Dose:  Not Given


Vitamin B Complex/Vit C/Folic Acid (Nephro-Dave)  1 tab PO 0800 [facility]


   Last Admin: 01/12/19 08:36 Dose:  Not Given











- Labs


Labs: 


                                        





                                 01/12/19 05:00 





                                 01/12/19 05:00 





                                        











PT  12.5 SECONDS (9.4-12.5)   01/09/19  20:30    


 


INR  1.09   01/09/19  20:30    


 


APTT  30.8 Seconds (25.1-36.5)   01/09/19  20:30    














- Constitutional


Appears: Non-toxic, No Acute Distress





- Head Exam


Head Exam: ATRAUMATIC, NORMOCEPHALIC





- Eye Exam


Eye Exam: EOMI





- ENT Exam


ENT Exam: Mucous Membranes Moist





- Respiratory Exam


Respiratory Exam: NORMAL BREATHING PATTERN.  absent: Accessory Muscle Use, 

Respiratory Distress





- GI/Abdominal Exam


GI & Abdominal Exam: Soft.  absent: Tenderness





- Neurological Exam


Neurological Exam: Alert, Awake, Oriented x3





Assessment and Plan





- Assessment and Plan (Free Text)


Assessment: 





53M w. ESRD needing AVF


Plan: 





-Tentative AVF monday


-maintain LUE precautions


-will follow


-d/w attending





Talisha PGY4





<Darin Clarke - Last Filed: 01/20/19 12:02>





Objective





- Vital Signs/Intake and Output


Vital Signs (last 24 hours): 


                                        











Temp Pulse Resp BP Pulse Ox


 


 98.9 F   86   18   145/73   99 


 


 01/16/19 14:00  01/16/19 17:34  01/16/19 14:00  01/16/19 17:34  01/16/19 14:00











- Labs


Labs: 


                                        





                                 01/16/19 08:04 





                                 01/16/19 08:04 





                                        











PT  12.0 SECONDS (9.4-12.5)   01/14/19  06:35    


 


INR  1.04   01/14/19  06:35    


 


APTT  27.2 Seconds (25.1-36.5)   01/14/19  06:35    














Assessment and Plan





- Assessment and Plan (Free Text)


Plan: 


Patient was seen, examined and evaluated by me. I agree with resident's 

assessment and plan.

## 2019-01-12 NOTE — CP.PCM.PN
Subjective





- Date & Time of Evaluation


Date of Evaluation: 01/12/19


Time of Evaluation: 10:55





- Subjective


Subjective: 





Nephrology Consultation Note: 





Assessment: stable


Uremia, missed HD/non-compliance, hyperkalemia, fluid overload, HTN emergency


Diabetic chronic Kidney Disease (E11.22) 


Hypertensive Chronic Kidney Disease (I12.0)


End stage renal disease (N18.6) dependence on hemodialysis (Z99.2) via PC


Anemia (D64.9), Hyperphosphatemia (E83.39), Secondary Hyperparathyroidism 

(E21.1), HTN (I12.0)





Plan:


Will plan for HD today as ordered. Will plan for dialysis TTS schedule. Continue

with Nephrovite 1 tab/day. 


PRBC as needed for anemia. on GIA weekly with dialysis as last Hb 7.5 TSAT 24% 

Ferritin 94, added 1 gram IV iron loading dose


Continue with phos binders, last phos level 8.4, repeat


discontinue with calcitriol. last  @ goal. added weekly Vit D as last 

level <12.8


BP control with meds as ordered. Patient not on RAAS blocker as with 

hyperkalemia, added losartan. resume norvasc and labetalol. lasix on non HD days


Glycemic control, Dialysis consistent diet


Further work up/management as per primary team


Dose meds/antibiotics (if needed) for ESRD status. Avoid fleets enema/magnesium 

based laxatives.


pt was educated about risks of missing HD. 


SW consult for outpt HD placement


vascular surgery consult for AVF placement





Thanks for allowing me to participate in care of your patient. Will follow 

patient with you. Please call if any Qs. had d/w team


Dr Otis Anton


Office: [phone] Cell: [phone] Fax: [phone]





Chief Complaint;feels better


HPI: Pt is a 53 M with hx of ESRD recently started on hemodialysis via p

ermacathbut pt non compliant with HD, hadn't received HD since left from 

hospital AMA , chronic anemia, hyperphosphatemia, secondary hyperparathyroidism,

Diabetes Mellitus (30 years), hypertension presented with complaints of 

worsening SOB on exertion and HTN emergency


Renal consult requested for ESRD management. 


pt feels sick





ROS: 


Cardiovascular: No chest pain. 


Pulmonary: improved shortness of breath 


Gastrointestinal: denies abdominal pain No nausea. No vomiting. 


Genitourinary: No pain while urinating. Denies blood in urine. 


All other negative except as mentioned in HPI





Physical Examination:   seen on HD    


General Appearance: Comfortable, in no acute respiratory distress, co-operative 

. obese


Vitals reviewed and noted as below


Head; Atraumatic, normocephalic


ENT: no ulcers no thrush. Tongue is midline. Oropharynx: no rash or ulcers.


EYES: Pupils are equal, round and reactive to light accommodation. Eye muscles 

and extraocular movement intact. Sclera is anicteric.


Neck; supple no lymphadenopathy, no thyromegaly or bruit


Lungs: Normal respiratory rate/effort. Breath sounds bilateral reduced at bases 

clearer


Heart: Normal rate. s1s2 normal. No rub or gallop. 


Extremities: no edema. No varicose veins


Neurological: Patient is alert, awake and oriented to person, place and time. No

focal deficit. Strength bilateral appropriate and equal


Skin: Warm and dry. Normal turgor. No rash. Palpitation: Normal elasticity for 

age


Abdomen: Abdomen is soft. Bowel sounds +. There is no abdominal tenderness, no 

guarding/rigidity or organomegaly


Psych: improved insight and normal affect/mood


MSK: no joint tenderness or swelling. Digits and nails normal, no deformity


: kidney or bladder not palpable


Access: permacath





Labs/imaging reviewed.


Past medical history, past surgical history, family history, social history, 

allergy reviewed and noted as below


Family Hx: no hx of CKD. Non contributory 








Objective





- Vital Signs/Intake and Output


Vital Signs (last 24 hours): 


                                        











Temp Pulse Resp BP Pulse Ox


 


 98.4 F   89   20   164/76 H  97 


 


 01/12/19 06:00  01/12/19 06:00  01/12/19 06:00  01/12/19 06:00  01/12/19 06:00











- Medications


Medications: 


                               Current Medications





Amlodipine Besylate (Norvasc)  10 mg PO DAILY [facility]


   Last Admin: 01/11/19 12:19 Dose:  10 mg


Atorvastatin Calcium (Lipitor)  40 mg PO HS [facility]


   Last Admin: 01/11/19 22:00 Dose:  40 mg


Calcium Acetate (Phoslo)  1,334 mg PO WM [facility]


   Last Admin: 01/12/19 08:36 Dose:  Not Given


Darbepoetin Iglesia (Aranesp)  100 mcg IVP QWK [facility]


   Last Admin: 01/11/19 09:39 Dose:  100 mcg


Dextrose (Dextrose 50% Inj)  0 ml IV STAT PRN; Protocol


   PRN Reason: Hypoglycemia Protocol


Ergocalciferol (Drisdol 50,000 Intl Units Cap)  1 cap PO Q7D [facility]


   Last Admin: 01/10/19 11:41 Dose:  1 cap


Furosemide (Lasix)  80 mg PO MWF [facility]


Heparin Sodium (Porcine) (Heparin)  2,000 units IVP TTS [facility]; Protocol


Dextrose (Dextrose 5% In Water 1000 Ml)  1,000 mls @ 0 mls/hr IV .Q0M PRN; 

Protocol


   PRN Reason: Hypoglycemia Protocol


Iron Sucrose 100 mg/ Sodium (Chloride)  105 mls @ 210 mls/hr IVPB DAILY [facility]


   Stop: 01/20/19 11:16


   Last Admin: 01/11/19 13:08 Dose:  210 mls/hr


Insulin Human Lispro (Humalog Low)  0 units SC ACHS [facility]; Protocol


   Last Admin: 01/12/19 07:45 Dose:  Not Given


Labetalol HCl (Trandate)  200 mg PO BID [facility]


   Last Admin: 01/11/19 17:17 Dose:  200 mg


Losartan Potassium (Cozaar)  50 mg PO QPM [facility]


Pantoprazole Sodium (Protonix Ec Tab)  40 mg PO ACB [facility]


   Last Admin: 01/12/19 08:37 Dose:  Not Given


Vitamin B Complex/Vit C/Folic Acid (Nephro-Dave)  1 tab PO 0800 [facility]


   Last Admin: 01/12/19 08:36 Dose:  Not Given











- Labs


Labs: 


                                        





                                 01/12/19 05:00 





                                 01/12/19 05:00 





                                        











PT  12.5 SECONDS (9.4-12.5)   01/09/19  20:30    


 


INR  1.09   01/09/19  20:30    


 


APTT  30.8 Seconds (25.1-36.5)   01/09/19  20:30

## 2019-01-13 NOTE — CP.PCM.PN
<Maximilian Askew - Last Filed: 01/13/19 10:56>





Subjective





- Date & Time of Evaluation


Date of Evaluation: 01/13/19


Time of Evaluation: 10:56





- Subjective


Subjective: 





Surgery Progress note- Dr. Bob





Patient seen and examined at bedside. No new complaints. Received dialysis 

yesterday. Denies fevers, chills, chest pain, shortness of breath. 





Objective





- Vital Signs/Intake and Output


Vital Signs (last 24 hours): 


                                        











Temp Pulse Resp BP Pulse Ox


 


 99 F   84   20   132/65   97 


 


 01/13/19 06:00  01/13/19 09:12  01/13/19 06:00  01/13/19 09:12  01/13/19 06:00








Intake and Output: 


                                        











 01/13/19 01/13/19





 06:59 18:59


 


Output Total 400 


 


Balance -400 














- Medications


Medications: 


                               Current Medications





Amlodipine Besylate (Norvasc)  10 mg PO DAILY [facility]


   Last Admin: 01/13/19 09:11 Dose:  10 mg


Atorvastatin Calcium (Lipitor)  40 mg PO HS [facility]


   Last Admin: 01/12/19 21:23 Dose:  40 mg


Calcium Acetate (Phoslo)  1,334 mg PO WM [facility]


   Last Admin: 01/13/19 09:11 Dose:  1,334 mg


Darbepoetin Iglesia (Aranesp)  100 mcg IVP QWK [facility]


   Last Admin: 01/11/19 09:39 Dose:  100 mcg


Dextrose (Dextrose 50% Inj)  0 ml IV STAT PRN; Protocol


   PRN Reason: Hypoglycemia Protocol


Ergocalciferol (Drisdol 50,000 Intl Units Cap)  1 cap PO Q7D [facility]


   Last Admin: 01/10/19 11:41 Dose:  1 cap


Furosemide (Lasix)  80 mg PO MWF [facility]


Heparin Sodium (Porcine) (Heparin)  2,000 units IVP TTS [facility]; Protocol


Dextrose (Dextrose 5% In Water 1000 Ml)  1,000 mls @ 0 mls/hr IV .Q0M PRN; 

Protocol


   PRN Reason: Hypoglycemia Protocol


Iron Sucrose 100 mg/ Sodium (Chloride)  105 mls @ 210 mls/hr IVPB DAILY [facility]


   Stop: 01/20/19 11:16


   Last Admin: 01/13/19 10:43 Dose:  210 mls/hr


Insulin Human Lispro (Humalog Low)  0 units SC ACHS [facility]; Protocol


   Last Admin: 01/13/19 08:47 Dose:  Not Given


Labetalol HCl (Trandate)  200 mg PO BID [facility]


   Last Admin: 01/13/19 09:12 Dose:  200 mg


Losartan Potassium (Cozaar)  50 mg PO QPM [facility]


Pantoprazole Sodium (Protonix Ec Tab)  40 mg PO ACB [facility]


   Last Admin: 01/13/19 09:12 Dose:  40 mg


Vitamin B Complex/Vit C/Folic Acid (Nephro-Dave)  1 tab PO 0800 [facility]


   Last Admin: 01/13/19 09:11 Dose:  1 tab











- Labs


Labs: 


                                        





                                 01/13/19 07:00 





                                 01/13/19 07:00 





                                        











PT  12.5 SECONDS (9.4-12.5)   01/09/19  20:30    


 


INR  1.09   01/09/19  20:30    


 


APTT  30.8 Seconds (25.1-36.5)   01/09/19  20:30    














- Constitutional


Appears: Non-toxic, No Acute Distress





- Head Exam


Head Exam: ATRAUMATIC





- Eye Exam


Eye Exam: EOMI.  absent: Scleral icterus





- ENT Exam


ENT Exam: Mucous Membranes Moist





- Respiratory Exam


Respiratory Exam: NORMAL BREATHING PATTERN.  absent: Accessory Muscle Use, 

Respiratory Distress





- Cardiovascular Exam


Cardiovascular Exam: REGULAR RHYTHM.  absent: Bradycardia, Tachycardia





- GI/Abdominal Exam


GI & Abdominal Exam: Soft.  absent: Distended, Firm, Guarding, Rigid, Tenderness





- Neurological Exam


Neurological Exam: Alert, Awake, Oriented x3





- Psychiatric Exam


Psychiatric exam: Normal Affect





- Skin


Skin Exam: Intact, Warm





Assessment and Plan





- Assessment and Plan (Free Text)


Assessment: 





53M w/ ESRD on HD


Plan: 





- Plan for OR for AVF this week


- maintain LUE precautions


- medically optimize


- further recs per Dr. Bob Surgical attending





 PGY2





<Darin Clarke - Last Filed: 01/20/19 12:02>





Objective





- Vital Signs/Intake and Output


Vital Signs (last 24 hours): 


                                        











Temp Pulse Resp BP Pulse Ox


 


 98.9 F   86   18   145/73   99 


 


 01/16/19 14:00  01/16/19 17:34  01/16/19 14:00  01/16/19 17:34  01/16/19 14:00











- Labs


Labs: 


                                        





                                 01/16/19 08:04 





                                 01/16/19 08:04 





                                        











PT  12.0 SECONDS (9.4-12.5)   01/14/19  06:35    


 


INR  1.04   01/14/19  06:35    


 


APTT  27.2 Seconds (25.1-36.5)   01/14/19  06:35    














Assessment and Plan





- Assessment and Plan (Free Text)


Plan: 


Patient was seen, examined and evaluated by me. I agree with resident's 

assessment and plan.

## 2019-01-13 NOTE — CP.PCM.PN
<Linda Last - Last Filed: 01/13/19 11:03>





Subjective





- Date & Time of Evaluation


Date of Evaluation: 01/13/19


Time of Evaluation: 10:00





- Subjective


Subjective: 





PGY1 Medicine Progress Note for Dr. Lee 


Patient was seen and evaluated at bedside. No complaints overnight. Patient 

tolerating diet well. Patient currently denies chest pain, palpitations, 

shortness of breath, abdominal pain, dysuria, back pain, dizziness headaches, 

confusion, fever and/or chills. Extensive discussion was had with patient 

regarding future dialysis plan and outpatient follow up with nephrologist and 

renal transplant. 


 





Objective





- Vital Signs/Intake and Output


Vital Signs (last 24 hours): 


                                        











Temp Pulse Resp BP Pulse Ox


 


 99 F   84   20   132/65   97 


 


 01/13/19 06:00  01/13/19 09:12  01/13/19 06:00  01/13/19 09:12  01/13/19 06:00








Intake and Output: 


                                        











 01/13/19 01/13/19





 06:59 18:59


 


Output Total 400 


 


Balance -400 














- Medications


Medications: 


                               Current Medications





Amlodipine Besylate (Norvasc)  10 mg PO DAILY [facility]


   Last Admin: 01/13/19 09:11 Dose:  10 mg


Atorvastatin Calcium (Lipitor)  40 mg PO HS [facility]


   Last Admin: 01/12/19 21:23 Dose:  40 mg


Calcium Acetate (Phoslo)  1,334 mg PO WM [facility]


   Last Admin: 01/13/19 09:11 Dose:  1,334 mg


Darbepoetin Iglesia (Aranesp)  100 mcg IVP QWK [facility]


   Last Admin: 01/11/19 09:39 Dose:  100 mcg


Dextrose (Dextrose 50% Inj)  0 ml IV STAT PRN; Protocol


   PRN Reason: Hypoglycemia Protocol


Ergocalciferol (Drisdol 50,000 Intl Units Cap)  1 cap PO Q7D [facility]


   Last Admin: 01/10/19 11:41 Dose:  1 cap


Furosemide (Lasix)  80 mg PO MWF [facility]


Heparin Sodium (Porcine) (Heparin)  2,000 units IVP TTS [facility]; Protocol


Dextrose (Dextrose 5% In Water 1000 Ml)  1,000 mls @ 0 mls/hr IV .Q0M PRN; 

Protocol


   PRN Reason: Hypoglycemia Protocol


Iron Sucrose 100 mg/ Sodium (Chloride)  105 mls @ 210 mls/hr IVPB DAILY [facility]


   Stop: 01/20/19 11:16


   Last Admin: 01/12/19 14:38 Dose:  210 mls/hr


Insulin Human Lispro (Humalog Low)  0 units SC ACHS [facility]; Protocol


   Last Admin: 01/13/19 08:47 Dose:  Not Given


Labetalol HCl (Trandate)  200 mg PO BID [facility]


   Last Admin: 01/13/19 09:12 Dose:  200 mg


Losartan Potassium (Cozaar)  50 mg PO QPM [facility]


Pantoprazole Sodium (Protonix Ec Tab)  40 mg PO ACB [facility]


   Last Admin: 01/13/19 09:12 Dose:  40 mg


Vitamin B Complex/Vit C/Folic Acid (Nephro-Dave)  1 tab PO 0800 [facility]


   Last Admin: 01/13/19 09:11 Dose:  1 tab











- Labs


Labs: 


                                        





                                 01/13/19 07:00 





                                 01/13/19 07:00 





                                        











PT  12.5 SECONDS (9.4-12.5)   01/09/19  20:30    


 


INR  1.09   01/09/19  20:30    


 


APTT  30.8 Seconds (25.1-36.5)   01/09/19  20:30    














- Additional Findings


Additional findings: 





- Constitutional


Appears: No Acute Distress





- Head Exam


Head Exam: ATRAUMATIC, NORMAL INSPECTION, NORMOCEPHALIC





- Eye Exam


Eye Exam: EOMI, PERRL





- ENT Exam


ENT Exam: Mucous Membranes Moist





- Neck Exam


Neck Exam: Full ROM





- Respiratory Exam


Respiratory Exam: Clear to Ausculation Bilateral, NORMAL BREATHING PATTERN





- Cardiovascular Exam


Cardiovascular Exam: REGULAR RHYTHM, +S1, +S2





- GI/Abdominal Exam


GI & Abdominal Exam: Soft, Normal Bowel Sounds.  absent: Firm, Guarding, Rigid, 

Tenderness





- Extremities Exam


Extremities Exam: Full ROM.  absent: Pedal Edema





- Neurological Exam


Neurological Exam: Alert, Awake, Normal Gait, Oriented x3





- Psychiatric Exam


Psychiatric exam: Normal Affect, Normal Mood





- Skin


Skin Exam: Dry, Intact





Assessment and Plan





- Assessment and Plan (Free Text)


Assessment: 





53 year old male with past medical history of HTN, DM2, unspecified renal mass, 

hepatic hemangioma CKD on HD originally scheduled with T/TH/SA with poor 

compliance who presents to [facility] ED for fatigue, SOB, fluid accumulation, 

productive cough found to be be volume overloaded secondary to poor compliance 

with HD, medication, and diet. Patient was stabilized in ICU underwent HD and 

was transferred out to med/surg. Patient continues to undergo HD while in 

Hospital while outpatient HD can be set up. 





Plan: 


Shortness of breath / volume overload; secondary to noncompliance with dialysis


- Etiology was failure for outpatient hemodialysis due to non-compliance


- Nephrology consulted and following





CKD Stage V requiring HD 


- Dialysis was performed 1/12/19 and Patient tolerated well


- Cr; BUN: improved (6.7; 37) 


- Nephrology consulted and following, follow recs


- HD on admission and throughout hospital stay


- Outpatient HD in process of being set up


- Renal Diet





Anemia


- Likely etiology is chronic disease procress


- Continue IV Fe and Epogen


- Nephrology consulted


- Maintain Hgb >7.0


- Attempt to avoid unnecessary transfusions due to potential side effects for 

prolonging or altering renal transplant timeline





History of hypertension / mitral regurgitation


- Continue labetalol and Norvasc


- Echo cardiogram from 11/2018 reviewed and appreciated


- Repeat echo in one month for re-evaluation 





DM2


- ISS


- ACHS


- Maintain euglycemia 





HLD


- Continue lipitor





GI/DVT ppx


- Protonix


- Heparin 





Patient seen and case discussed in detail with Dr. Rosa Last PGY1 





<Khadra Lee - Last Filed: 01/13/19 11:33>





Objective





- Vital Signs/Intake and Output


Vital Signs (last 24 hours): 


                                        











Temp Pulse Resp BP Pulse Ox


 


 99 F   84   20   132/65   97 


 


 01/13/19 06:00  01/13/19 09:12  01/13/19 06:00  01/13/19 09:12  01/13/19 06:00








Intake and Output: 


                                        











 01/13/19 01/13/19





 06:59 18:59


 


Output Total 400 


 


Balance -400 














- Medications


Medications: 


                               Current Medications





Amlodipine Besylate (Norvasc)  10 mg PO DAILY [facility]


   Last Admin: 01/13/19 09:11 Dose:  10 mg


Atorvastatin Calcium (Lipitor)  40 mg PO HS [facility]


   Last Admin: 01/12/19 21:23 Dose:  40 mg


Calcium Acetate (Phoslo)  1,334 mg PO WM [facility]


   Last Admin: 01/13/19 09:11 Dose:  1,334 mg


Darbepoetin Iglesia (Aranesp)  100 mcg IVP QWK [facility]


   Last Admin: 01/11/19 09:39 Dose:  100 mcg


Dextrose (Dextrose 50% Inj)  0 ml IV STAT PRN; Protocol


   PRN Reason: Hypoglycemia Protocol


Ergocalciferol (Drisdol 50,000 Intl Units Cap)  1 cap PO Q7D [facility]


   Last Admin: 01/10/19 11:41 Dose:  1 cap


Furosemide (Lasix)  80 mg PO MWF [facility]


Heparin Sodium (Porcine) (Heparin)  2,000 units IVP TTS [facility]; Protocol


Dextrose (Dextrose 5% In Water 1000 Ml)  1,000 mls @ 0 mls/hr IV .Q0M PRN; 

Protocol


   PRN Reason: Hypoglycemia Protocol


Iron Sucrose 100 mg/ Sodium (Chloride)  105 mls @ 210 mls/hr IVPB DAILY [facility]


   Stop: 01/20/19 11:16


   Last Admin: 01/13/19 10:43 Dose:  210 mls/hr


Insulin Human Lispro (Humalog Low)  0 units SC ACHS [facility]; Protocol


   Last Admin: 01/13/19 08:47 Dose:  Not Given


Labetalol HCl (Trandate)  200 mg PO BID [facility]


   Last Admin: 01/13/19 09:12 Dose:  200 mg


Losartan Potassium (Cozaar)  50 mg PO QPM [facility]


Pantoprazole Sodium (Protonix Ec Tab)  40 mg PO ACB [facility]


   Last Admin: 01/13/19 09:12 Dose:  40 mg


Vitamin B Complex/Vit C/Folic Acid (Nephro-Dave)  1 tab PO 0800 [facility]


   Last Admin: 01/13/19 09:11 Dose:  1 tab











- Labs


Labs: 


                                        





                                 01/13/19 07:00 





                                 01/13/19 07:00 





                                        











PT  12.5 SECONDS (9.4-12.5)   01/09/19  20:30    


 


INR  1.09   01/09/19  20:30    


 


APTT  30.8 Seconds (25.1-36.5)   01/09/19  20:30    














Attending/Attestation





- Attestation


I have personally seen and examined this patient.: Yes


I have fully participated in the care of the patient.: Yes


I have reviewed all pertinent clinical information, including history, physical 

exam and plan: Yes


Notes (Text): 





01/13/19 11:30








Attending note;





Patient seen and examined with resident.


Denies any chest pain, shortness of breath.


Denies any cough or sputum production.


Denies any fevers, chills.


Leg swelling resolved.








Patient is a 53-year-old male with  PMH of DMII, HTN, CKD on hemodialysis Tue, 

Thurs, Sat, noncompliant with HD treatment, hepatic hemangioma presents with SOB

and cough that started 1 month ago. 





1. Shortness of breath / volume overload; secondary to noncompliance with 

dialysis.


Patient was dialyzed on 1/10, 1/11 and 1/12.


Volume overload resolved. Leg swelling improved.


Shortness of breath improved.


Nephrology evaluation appreciated.


2. Anemia; chronic. No active bleeding noted. Continue IV iron and Epogen. 

Hemoglobin improved to 7.8.


3. GI evaluation appreciated; needs outpatient workup.


4. History of hypertension / mitral regurgitation;  continue labetalol on 

Norvasc .currently clinically stable. Needs repeat echocardiogram in few weeks.


5. Surgery evaluation appreciated. Tentative AV fistula placement on Monday. We 

will follow up closely.


Patient is moderate risk for planned vascular procedure. Cardiology evaluation 

appreciated.


Currently patient is medically stable.





Pending Medicaid paperwork.


 evaluation appreciated.








Upon discharge the patient will follow-up with [facility] clinic.


Patient needs outpatient hemodialysis.





Diagnosis, follow-up plan discussed with patient in detail.

## 2019-01-13 NOTE — CP.PCM.CON
History of Present Illness





- History of Present Illness


History of Present Illness: 





CONSULT for Dr. James








Awake, alert , no distress





Reason for consultation: Cardiac evaluation for pre-op surgery for AV fistula, 

risk stratification





Brief history of present illness: A 53 year old male who initally presented to 

the ER with shortness of breath. History of diabetes mellitus, hypertension, 

chronic anemia, former smoker,end stage renal disease, on hemodialysis 3x a 

week, (T,Th,Sa), non-compliant started on 12/2018, renal mass, hepatic 

hemangioma. Consult was called for risk stratification for possible AV fistula 

surgery tommorrow.





Seen and examined by me and Dr. Butts











Review of Systems





- Review of Systems


All systems: reviewed and no additional remarkable complaints except


Review of Systems: 





as per HPI





Past Patient History





- Infectious Disease


Hx of Infectious Diseases: None





- Past Medical History & Family History


Past Medical History?: Yes





- Past Social History


Smoking Status: Former Smoker





- CARDIAC


Hx Cardiac Disorders: Yes


Hx Hypertension: Yes





- PULMONARY


Hx Respiratory Disorders: No





- NEUROLOGICAL


Hx Neurological Disorder: No





- HEENT


Hx HEENT Problems: No





- RENAL


Hx Chronic Kidney Disease: No


Hx Dialysis: Yes


Type of Dialysis Access: R chest cath





- ENDOCRINE/METABOLIC


Hx Diabetes Mellitus Type 1: Yes





- HEMATOLOGICAL/ONCOLOGICAL


Hx Blood Disorders: No





- INTEGUMENTARY


Hx Dermatological Problems: No





- MUSCULOSKELETAL/RHEUMATOLOGICAL


Hx Musculoskeletal Disorders: No





- GASTROINTESTINAL


Hx Gastrointestinal Disorders: No





- GENITOURINARY/GYNECOLOGICAL


Hx Genitourinary Disorders: No





- PSYCHIATRIC


Hx Psychophysiologic Disorder: No


Hx Substance Use: No





- SURGICAL HISTORY


Hx Cardiac Catheterization: Yes





- ANESTHESIA


Hx Anesthesia: No





Meds


Allergies/Adverse Reactions: 


                                    Allergies











Allergy/AdvReac Type Severity Reaction Status Date / Time


 


No Known Allergies Allergy   Verified 01/09/19 20:01














- Medications


Medications: 


                               Current Medications





Amlodipine Besylate (Norvasc)  10 mg PO DAILY [facility]


   Last Admin: 01/13/19 09:11 Dose:  10 mg


Atorvastatin Calcium (Lipitor)  40 mg PO HS [facility]


   Last Admin: 01/12/19 21:23 Dose:  40 mg


Calcium Acetate (Phoslo)  1,334 mg PO WM [facility]


   Last Admin: 01/13/19 09:11 Dose:  1,334 mg


Darbepoetin Iglesia (Aranesp)  100 mcg IVP QWK [facility]


   Last Admin: 01/11/19 09:39 Dose:  100 mcg


Dextrose (Dextrose 50% Inj)  0 ml IV STAT PRN; Protocol


   PRN Reason: Hypoglycemia Protocol


Ergocalciferol (Drisdol 50,000 Intl Units Cap)  1 cap PO Q7D [facility]


   Last Admin: 01/10/19 11:41 Dose:  1 cap


Furosemide (Lasix)  80 mg PO MWF [facility]


Heparin Sodium (Porcine) (Heparin)  2,000 units IVP TTS [facility]; Protocol


Dextrose (Dextrose 5% In Water 1000 Ml)  1,000 mls @ 0 mls/hr IV .Q0M PRN; 

Protocol


   PRN Reason: Hypoglycemia Protocol


Iron Sucrose 100 mg/ Sodium (Chloride)  105 mls @ 210 mls/hr IVPB DAILY [facility]


   Stop: 01/20/19 11:16


   Last Admin: 01/13/19 10:43 Dose:  210 mls/hr


Insulin Human Lispro (Humalog Low)  0 units SC ACHS [facility]; Protocol


   Last Admin: 01/13/19 08:47 Dose:  Not Given


Labetalol HCl (Trandate)  200 mg PO BID [facility]


   Last Admin: 01/13/19 09:12 Dose:  200 mg


Losartan Potassium (Cozaar)  50 mg PO QPM [facility]


Pantoprazole Sodium (Protonix Ec Tab)  40 mg PO ACB [facility]


   Last Admin: 01/13/19 09:12 Dose:  40 mg


Vitamin B Complex/Vit C/Folic Acid (Nephro-Dave)  1 tab PO 0800 [facility]


   Last Admin: 01/13/19 09:11 Dose:  1 tab











Physical Exam





- Constitutional


Appears: Non-toxic, No Acute Distress





- Head Exam


Head Exam: NORMAL INSPECTION, NORMOCEPHALIC





- Eye Exam


Eye Exam: Normal appearance


Pupil Exam: NORMAL ACCOMODATION





- ENT Exam


ENT Exam: Mucous Membranes Moist, Normal Exam





- Respiratory Exam


Respiratory Exam: Decreased Breath Sounds, Clear to Auscultation Bilateral, 

NORMAL BREATHING PATTERN





- Cardiovascular Exam


Cardiovascular Exam: +S1, +S2


Additional comments: 





right chest permacath





- GI/Abdominal Exam


GI & Abdominal Exam: Normal Bowel Sounds, Soft





- Extremities Exam


Extremities exam: Positive for: full ROM, normal capillary refill





- Neurological Exam


Neurological exam: Alert, Oriented x3





- Psychiatric Exam


Psychiatric exam: Normal Affect, Normal Mood





- Skin


Skin Exam: Dry, Normal Color, Warm





Results





- Vital Signs


Recent Vital Signs: 


                                Last Vital Signs











Temp  99 F   01/13/19 06:00


 


Pulse  84   01/13/19 09:12


 


Resp  20   01/13/19 06:00


 


BP  132/65   01/13/19 09:12


 


Pulse Ox  97   01/13/19 06:00














- Labs


Result Diagrams: 


                                 01/13/19 07:00





                                 01/13/19 07:00


Labs: 


                         Laboratory Results - last 24 hr











  01/12/19 01/12/19 01/12/19





  11:07 11:49 16:18


 


WBC   


 


RBC   


 


Hgb   


 


Hct   


 


MCV   


 


MCH   


 


MCHC   


 


RDW   


 


Plt Count   


 


MPV   


 


Gran %   


 


Lymph % (Auto)   


 


Mono % (Auto)   


 


Eos % (Auto)   


 


Baso % (Auto)   


 


Gran #   


 


Lymph # (Auto)   


 


Mono # (Auto)   


 


Eos # (Auto)   


 


Baso # (Auto)   


 


Sodium   


 


Potassium   


 


Chloride   


 


Carbon Dioxide   


 


Anion Gap   


 


BUN   


 


Creatinine   


 


Est GFR ( Amer)   


 


Est GFR (Non-Af Amer)   


 


POC Glucose (mg/dL)  104  116 H  272 H


 


Random Glucose   


 


Calcium   


 


Total Bilirubin   


 


AST   


 


ALT   


 


Alkaline Phosphatase   


 


Total Protein   


 


Albumin   


 


Globulin   


 


Albumin/Globulin Ratio   














  01/12/19 01/13/19 01/13/19





  21:14 06:25 07:00


 


WBC    12.7 H D


 


RBC    2.67 L


 


Hgb    7.8 L


 


Hct    22.6 L


 


MCV    84.6


 


MCH    29.2


 


MCHC    34.5


 


RDW    12.8


 


Plt Count    175


 


MPV    10.4


 


Gran %    74.5 H


 


Lymph % (Auto)    12.1 L


 


Mono % (Auto)    8.8 H


 


Eos % (Auto)    4.4


 


Baso % (Auto)    0.2


 


Gran #    9.43 H


 


Lymph # (Auto)    1.5


 


Mono # (Auto)    1.1 H


 


Eos # (Auto)    0.6


 


Baso # (Auto)    0.03


 


Sodium   


 


Potassium   


 


Chloride   


 


Carbon Dioxide   


 


Anion Gap   


 


BUN   


 


Creatinine   


 


Est GFR ( Amer)   


 


Est GFR (Non-Af Amer)   


 


POC Glucose (mg/dL)  165 H  179 H 


 


Random Glucose   


 


Calcium   


 


Total Bilirubin   


 


AST   


 


ALT   


 


Alkaline Phosphatase   


 


Total Protein   


 


Albumin   


 


Globulin   


 


Albumin/Globulin Ratio   














  01/13/19





  07:00


 


WBC 


 


RBC 


 


Hgb 


 


Hct 


 


MCV 


 


MCH 


 


MCHC 


 


RDW 


 


Plt Count 


 


MPV 


 


Gran % 


 


Lymph % (Auto) 


 


Mono % (Auto) 


 


Eos % (Auto) 


 


Baso % (Auto) 


 


Gran # 


 


Lymph # (Auto) 


 


Mono # (Auto) 


 


Eos # (Auto) 


 


Baso # (Auto) 


 


Sodium  132


 


Potassium  4.3


 


Chloride  95 L


 


Carbon Dioxide  28


 


Anion Gap  13


 


BUN  37 H


 


Creatinine  6.7 H


 


Est GFR ( Amer)  10


 


Est GFR (Non-Af Amer)  9


 


POC Glucose (mg/dL) 


 


Random Glucose  119 H


 


Calcium  7.7 L


 


Total Bilirubin  0.5


 


AST  65 H D


 


ALT  74 H


 


Alkaline Phosphatase  224 H


 


Total Protein  6.7


 


Albumin  3.3


 


Globulin  3.4


 


Albumin/Globulin Ratio  1.0 L














Assessment & Plan





- Assessment and Plan (Free Text)


Assessment: 








A 53 year old male who initally presented to the ER with shortness of breath. 

History of diabetes mellitus, hypertension, chronic anemia, former smoker,end 

stage renal disease, on hemodialysis 3x a week, (T,Th,Sa), non-compliant started

on 12/2018, renal mass, hepatic hemangioma. Consult was called for risk 

stratification for possible AV fistula surgery tomorrow. EKG- normal sinus 

rhythm. Denies chest pain, denies shortness of breath. No evidence of ischemia. 

No evidence of congestive heart failure. Cleared for AV fistula procedure 

tomorrow. Moderate risk considering co-morbidities. no absolute 

contraindication. H/H 7.8/22.6. Type and crossmatched PRBC for possible b

leeding.





Review of previous cardiac work up at [facility]:





11/30/18 Echo- Dilated left atrium, LVEF 52%


                      Normal LV size, mild to moderate concentric LVH


                      Moderate to severe MR, Mild TR








Plan: 





Denies chest pain or shortness of breath


Cleared with moderate risk for AV fistula tomorrow


H/H 7.8/22.6. Type and cross matched PRBC for possible bleeding intra-op


Heart rate stable


Blood pressure controlled


Continue current treatment


Continue current medications


Will follow up post operatively





Plan and treatment discussed with Dr. Butts





Thank you Dr. Lee for the opportunity of taking care of Gregg Jerome





Covering for . 








- Date & Time


Date: 01/13/19


Time: 11:00

## 2019-01-13 NOTE — CP.PCM.PN
Subjective





- Date & Time of Evaluation


Date of Evaluation: 01/13/19


Time of Evaluation: 12:48





- Subjective


Subjective: 





Nephrology Consultation Note: 





Assessment: stable


Uremia, missed HD/non-compliance, hyperkalemia, fluid overload, HTN emergency


Diabetic chronic Kidney Disease (E11.22) 


Hypertensive Chronic Kidney Disease (I12.0)


End stage renal disease (N18.6) dependence on hemodialysis (Z99.2) via PC


Anemia (D64.9), Hyperphosphatemia (E83.39), Secondary Hyperparathyroidism 

(E21.1), HTN (I12.0)





Plan:


Will plan for dialysis TTS schedule. Continue with Nephrovite 1 tab/day. 


PRBC as needed for anemia. on GIA weekly with dialysis as last Hb 7.8 TSAT 24% 

Ferritin 94, added 1 gram IV iron loading dose


Continue with phos binders, last phos level 8.4, repeat


discontinue with calcitriol. last  @ goal. added weekly Vit D as last 

level <12.8


BP control with meds as ordered. Patient on losartan. resume norvasc and 

labetalol. lasix on non HD days


Glycemic control, Dialysis consistent diet


Further work up/management as per primary team


Dose meds/antibiotics (if needed) for ESRD status. Avoid fleets enema/magnesium 

based laxatives.


pt was educated about risks of missing HD. 


SW consult for outpt HD placement


vascular surgery consult for AVF placement appreciated, planned for monday


pt stable for d/c from renal perspective once arranged for outpt HD.





Thanks for allowing me to participate in care of your patient. Will follow 

patient with you. Please call if any Qs. had d/w team


Dr Otis Anton


Office: [phone] Cell: [phone] Fax: [phone]





Chief Complaint;feels better


HPI: Pt is a 53 M with hx of ESRD recently started on hemodialysis via 

permacathbut pt non compliant with HD, hadn't received HD since left from hospit

al AMA , chronic anemia, hyperphosphatemia, secondary hyperparathyroidism, 

Diabetes Mellitus (30 years), hypertension presented with complaints of 

worsening SOB on exertion and HTN emergency


Renal consult requested for ESRD management. 


pt feels sick





ROS: 


Cardiovascular: No chest pain. 


Pulmonary: improved shortness of breath 


Gastrointestinal: denies abdominal pain No nausea. No vomiting. 


Genitourinary: No pain while urinating. Denies blood in urine. 


All other negative except as mentioned in HPI





Physical Examination:  


General Appearance: Comfortable, in no acute respiratory distress, co-operative 

. obese


Vitals reviewed and noted as below


Head; Atraumatic, normocephalic


ENT: no ulcers no thrush. Tongue is midline. Oropharynx: no rash or ulcers.


EYES: Pupils are equal, round and reactive to light accommodation. Eye muscles 

and extraocular movement intact. Sclera is anicteric.


Neck; supple no lymphadenopathy, no thyromegaly or bruit


Lungs: Normal respiratory rate/effort. Breath sounds bilateral reduced at bases 

clearer


Heart: Normal rate. s1s2 normal. No rub or gallop. 


Extremities: no edema. No varicose veins


Neurological: Patient is alert, awake and oriented to person, place and time. No

focal deficit. Strength bilateral appropriate and equal


Skin: Warm and dry. Normal turgor. No rash. Palpitation: Normal elasticity for 

age


Abdomen: Abdomen is soft. Bowel sounds +. There is no abdominal tenderness, no 

guarding/rigidity or organomegaly


Psych: improved insight and normal affect/mood


MSK: no joint tenderness or swelling. Digits and nails normal, no deformity


: kidney or bladder not palpable


Access: permacath





Labs/imaging reviewed.


Past medical history, past surgical history, family history, social history, 

allergy reviewed and noted as below


Family Hx: no hx of CKD. Non contributory 








Objective





- Vital Signs/Intake and Output


Vital Signs (last 24 hours): 


                                        











Temp Pulse Resp BP Pulse Ox


 


 99 F   84   20   132/65   97 


 


 01/13/19 06:00  01/13/19 09:12  01/13/19 06:00  01/13/19 09:12  01/13/19 06:00








Intake and Output: 


                                        











 01/13/19 01/13/19





 06:59 18:59


 


Output Total 400 


 


Balance -400 














- Medications


Medications: 


                               Current Medications





Amlodipine Besylate (Norvasc)  10 mg PO DAILY [facility]


   Last Admin: 01/13/19 09:11 Dose:  10 mg


Atorvastatin Calcium (Lipitor)  40 mg PO HS [facility]


   Last Admin: 01/12/19 21:23 Dose:  40 mg


Calcium Acetate (Phoslo)  1,334 mg PO WM [facility]


   Last Admin: 01/13/19 12:15 Dose:  1,334 mg


Darbepoetin Iglesia (Aranesp)  100 mcg IVP QWK [facility]


   Last Admin: 01/11/19 09:39 Dose:  100 mcg


Dextrose (Dextrose 50% Inj)  0 ml IV STAT PRN; Protocol


   PRN Reason: Hypoglycemia Protocol


Ergocalciferol (Drisdol 50,000 Intl Units Cap)  1 cap PO Q7D [facility]


   Last Admin: 01/10/19 11:41 Dose:  1 cap


Furosemide (Lasix)  80 mg PO MWF [facility]


Heparin Sodium (Porcine) (Heparin)  2,000 units IVP TTS [facility]; Protocol


Dextrose (Dextrose 5% In Water 1000 Ml)  1,000 mls @ 0 mls/hr IV .Q0M PRN; 

Protocol


   PRN Reason: Hypoglycemia Protocol


Iron Sucrose 100 mg/ Sodium (Chloride)  105 mls @ 210 mls/hr IVPB DAILY [facility]


   Stop: 01/20/19 11:16


   Last Admin: 01/13/19 10:43 Dose:  210 mls/hr


Insulin Human Lispro (Humalog Low)  0 units SC ACHS [facility]; Protocol


   Last Admin: 01/13/19 12:13 Dose:  Not Given


Labetalol HCl (Trandate)  200 mg PO BID [facility]


   Last Admin: 01/13/19 09:12 Dose:  200 mg


Losartan Potassium (Cozaar)  50 mg PO QPM [facility]


Pantoprazole Sodium (Protonix Ec Tab)  40 mg PO ACB [facility]


   Last Admin: 01/13/19 09:12 Dose:  40 mg


Vitamin B Complex/Vit C/Folic Acid (Nephro-Dave)  1 tab PO 0800 [facility]


   Last Admin: 01/13/19 09:11 Dose:  1 tab











- Labs


Labs: 


                                        





                                 01/13/19 07:00 





                                 01/13/19 07:00 





                                        











PT  12.5 SECONDS (9.4-12.5)   01/09/19  20:30    


 


INR  1.09   01/09/19  20:30    


 


APTT  30.8 Seconds (25.1-36.5)   01/09/19  20:30

## 2019-01-14 NOTE — CP.PCM.PN
<Alec Jones - Last Filed: 01/14/19 14:11>





Subjective





- Date & Time of Evaluation


Date of Evaluation: 01/14/19


Time of Evaluation: 07:00





- Subjective


Subjective: 





Seen and examined this morning at bedside. Pt denies chest pain, SOB. 





Objective





- Vital Signs/Intake and Output


Vital Signs (last 24 hours): 


                                        











Temp Pulse Resp BP Pulse Ox


 


 99.2 F   84   20   152/71 H  99 


 


 01/14/19 06:00  01/14/19 09:19  01/14/19 06:00  01/14/19 09:22  01/14/19 06:00











- Medications


Medications: 


                               Current Medications





Amlodipine Besylate (Norvasc)  10 mg PO DAILY [facility]


   Last Admin: 01/14/19 09:22 Dose:  10 mg


Atorvastatin Calcium (Lipitor)  40 mg PO HS [facility]


   Last Admin: 01/13/19 21:50 Dose:  40 mg


Calcium Acetate (Phoslo)  1,334 mg PO WM [facility]


   Last Admin: 01/14/19 12:22 Dose:  1,334 mg


Darbepoetin Iglesia (Aranesp)  100 mcg IVP QWK [facility]


   Last Admin: 01/11/19 09:39 Dose:  100 mcg


Dextrose (Dextrose 50% Inj)  0 ml IV STAT PRN; Protocol


   PRN Reason: Hypoglycemia Protocol


Ergocalciferol (Drisdol 50,000 Intl Units Cap)  1 cap PO Q7D [facility]


   Last Admin: 01/10/19 11:41 Dose:  1 cap


Furosemide (Lasix)  80 mg PO MWF [facility]


   Last Admin: 01/14/19 09:20 Dose:  80 mg


Heparin Sodium (Porcine) (Heparin)  2,000 units IVP TTS [facility]; Protocol


Dextrose (Dextrose 5% In Water 1000 Ml)  1,000 mls @ 0 mls/hr IV .Q0M PRN; 

Protocol


   PRN Reason: Hypoglycemia Protocol


Iron Sucrose 100 mg/ Sodium (Chloride)  105 mls @ 210 mls/hr IVPB DAILY [facility]


   Stop: 01/20/19 11:16


   Last Admin: 01/14/19 10:33 Dose:  210 mls/hr


Insulin Human Lispro (Humalog Low)  0 units SC ACHS [facility]; Protocol


   Last Admin: 01/14/19 12:08 Dose:  Not Given


Labetalol HCl (Trandate)  200 mg PO BID [facility]


   Last Admin: 01/14/19 09:19 Dose:  200 mg


Losartan Potassium (Cozaar)  50 mg PO QPM [facility]


   Last Admin: 01/13/19 17:36 Dose:  50 mg


Pantoprazole Sodium (Protonix Ec Tab)  40 mg PO ACB [facility]


   Last Admin: 01/14/19 09:19 Dose:  40 mg


Senna/Docusate Sodium (Senokot S 50 Mg-8.6 Mg)  2 tab PO HS [facility]


Vitamin B Complex/Vit C/Folic Acid (Nephro-Dave)  1 tab PO 0800 [facility]


   Last Admin: 01/14/19 09:21 Dose:  1 tab











- Labs


Labs: 


                                        





                                 01/14/19 06:35 





                                 01/14/19 06:35 





                                        











PT  12.0 SECONDS (9.4-12.5)   01/14/19  06:35    


 


INR  1.04   01/14/19  06:35    


 


APTT  27.2 Seconds (25.1-36.5)   01/14/19  06:35    














- Constitutional


Appears: No Acute Distress





- Head Exam


Head Exam: ATRAUMATIC, NORMOCEPHALIC





- Eye Exam


Eye Exam: EOMI





- ENT Exam


ENT Exam: Mucous Membranes Moist





- Neck Exam


Neck Exam: Full ROM





- Respiratory Exam


Respiratory Exam: Clear to Ausculation Bilateral, NORMAL BREATHING PATTERN.  

absent: Accessory Muscle Use, Respiratory Distress





- Cardiovascular Exam


Cardiovascular Exam: RRR, +S1, +S2.  absent: Diastolic murmur, Murmur





- GI/Abdominal Exam


GI & Abdominal Exam: Soft, Normal Bowel Sounds





- Extremities Exam


Extremities Exam: Full ROM.  absent: Calf Tenderness, Pedal Edema





- Neurological Exam


Neurological Exam: Alert, Awake, Oriented x3





- Psychiatric Exam


Psychiatric exam: Normal Affect, Normal Mood





- Skin


Skin Exam: Dry, Intact, Warm





Assessment and Plan





- Assessment and Plan (Free Text)


Assessment: 





Pt is a 52yo male with a PMH of HTN, DM2, hepatic hemangioma, CKD on HD 

originally scheduled for Tues/ Thurs/ Sat with poor compliance who presents to 

[facility] ED complaining of fatigue, SOB, productive cough who was found to be volume 

overloaded. Pt stabilized in ICU, received HD, and transferred to med/surg. Pt 

undergoes HD while in the hospital, out pt is in the process of being arranged. 


Plan: 





CKD Stage V requiring HD, history of noncompliance  


- Pt tolerating dialysis well [address], Sat


- Cr 9.5


- BUN 53 


- Renal Diet


- Etiology was failure for outpatient hemodialysis due to non-compliance


- Outpatient HD in process of being set up 


- fistula surgery postponed until tomorrow due to scheduling conflict 


- Nephrology consulted, rec giving the unit of pRBC today and having the pt 

undergo dialysis tomorrow 





Anemia


- Likely etiology is chronic disease process


- Continue IV Fe and Epogen


- Hgb 7.0


- transfuse 1 unit pRBC, surgery would like pt to receive 1 unit of blood before

fistula surgery tomorrow 


- FOBT, follow up





History of HTN


- Continue labetalol, Norvasc, cozaar


- Repeat echo in one month





DM2


- ISS


- ACHS





HLD


- lipitor





Constipation


- colace 100 BID





Ppx


- Protonix


- Heparin 








Pt seen, examined, assessment and plan discussed with Dr Anitra Jones PGY1, Internal Medicine Resident 





<Anitra Alfaro R - Last Filed: 01/15/19 08:44>





Objective





- Vital Signs/Intake and Output


Vital Signs (last 24 hours): 


                                        











Temp Pulse Resp BP Pulse Ox


 


 98.3 F   78   18   171/78 H  98 


 


 01/15/19 07:24  01/15/19 07:24  01/15/19 07:24  01/15/19 07:24  01/15/19 07:24








Intake and Output: 


                                        











 01/15/19 01/15/19





 06:59 18:59


 


Intake Total 240 


 


Balance 240 














- Medications


Medications: 


                               Current Medications





Amlodipine Besylate (Norvasc)  10 mg PO DAILY [facility]


   Last Admin: 01/14/19 09:22 Dose:  10 mg


Atorvastatin Calcium (Lipitor)  40 mg PO HS [facility]


   Last Admin: 01/14/19 21:15 Dose:  40 mg


Calcium Acetate (Phoslo)  1,334 mg PO WM [facility]


   Last Admin: 01/14/19 17:26 Dose:  1,334 mg


Darbepoetin Iglesia (Aranesp)  100 mcg IVP QWK [facility]


   Last Admin: 01/11/19 09:39 Dose:  100 mcg


Dextrose (Dextrose 50% Inj)  0 ml IV STAT PRN; Protocol


   PRN Reason: Hypoglycemia Protocol


Ergocalciferol (Drisdol 50,000 Intl Units Cap)  1 cap PO Q7D [facility]


   Last Admin: 01/10/19 11:41 Dose:  1 cap


Furosemide (Lasix)  80 mg PO MWF [facility]


   Last Admin: 01/14/19 09:20 Dose:  80 mg


Heparin Sodium (Porcine) (Heparin)  2,000 units IVP TTS [facility]; Protocol


Dextrose (Dextrose 5% In Water 1000 Ml)  1,000 mls @ 0 mls/hr IV .Q0M PRN; Pro

tocol


   PRN Reason: Hypoglycemia Protocol


Iron Sucrose 100 mg/ Sodium (Chloride)  105 mls @ 210 mls/hr IVPB DAILY [facility]


   Stop: 01/20/19 11:16


   Last Admin: 01/14/19 10:33 Dose:  210 mls/hr


Insulin Human Lispro (Humalog Low)  0 units SC ACHS [facility]; Protocol


   Last Admin: 01/15/19 06:34 Dose:  Not Given


Labetalol HCl (Trandate)  200 mg PO BID [facility]


   Last Admin: 01/14/19 17:25 Dose:  200 mg


Losartan Potassium (Cozaar)  50 mg PO QPM [facility]


   Last Admin: 01/14/19 17:25 Dose:  50 mg


Pantoprazole Sodium (Protonix Ec Tab)  40 mg PO ACB [facility]


   Last Admin: 01/14/19 09:19 Dose:  40 mg


Senna/Docusate Sodium (Senokot S 50 Mg-8.6 Mg)  2 tab PO HS [facility]


   Last Admin: 01/14/19 21:15 Dose:  2 tab


Vitamin B Complex/Vit C/Folic Acid (Nephro-Dave)  1 tab PO 0800 [facility]


   Last Admin: 01/14/19 09:21 Dose:  1 tab











- Labs


Labs: 


                                        





                                 01/15/19 06:50 





                                 01/15/19 06:50 





                                        











PT  12.0 SECONDS (9.4-12.5)   01/14/19  06:35    


 


INR  1.04   01/14/19  06:35    


 


APTT  27.2 Seconds (25.1-36.5)   01/14/19  06:35    














Attending/Attestation





- Attestation


I have personally seen and examined this patient.: Yes


I have fully participated in the care of the patient.: Yes


I have reviewed all pertinent clinical information, including history, physical 

exam and plan: Yes


Notes (Text): 


Patient seen and examined by me with resident at 9:50AM on 1/14/19.  Case 

including HPI, physical exam, and assessment and plan discussed with resident. 

Agree with above with following additions/corrections.


Patient is a 53 year old male with past medical history significant for DM2, 

hypertension, ESRD on HD T/Th/Sat with noncompliance, renal mass, and hepatic 

hemangioma that presented to the emergency room with shortness of breath and 

cough that started one month ago.


Patient states that he is feeling ok. States he has not had a bowel movement 

since 1/10/19. Patient has been ambulating. Patient denies shortness of breath. 

No chest pain or palpitations. No headaches or dizziness. No fevers or chills.  

No nausea, vomiting, or abdominal pain. No dysuria. No diarrhea or constipation.


Physical exam:


General: Awake and alert sitting up in bed in no acute distress


HEENT: Normocephalic, atraumatic. Extraocular muscles intact, pupils equal and 

reactive, no scleral icterus. Oropharynx is pink and moist. No pharyngeal 

erythema or exudate appreciated. Neck is supple. 


Cardiovascular: Regular rhythm. Normal S1 and S2. No murmurs, rubs, or gallops 

appreciated


Pulmonary: Normal respiratory effort. No rhonchi, rales, or wheezing 

appreciated.


Gastrointestinal: Soft, nondistended. Nontender. Positive bowel sounds all 4 

quadrants. No guarding. 


Musculoskeletal:  Moves all extremities. No calf tenderness. No edema appr

eciated. 


Central nervous system: AAOx3, CN 2-12 grossly intact. 


Dermatologic:  Skin warm and dry. 


Assessment and plan: Patient is a 53 year old male with past medical history 

significant for DM2, hypertension, ESRD on HD T/Th/Sat with noncompliance, renal

mass, and hepatic hemangioma that presented to the emergency room with shortness

of breath and cough that started one month ago.


1. Shortness of breath and lower extremity edema secondary to fluid overload 

secondary to noncompliance with dialysis. BUN/Cr downtrending. Nephrologist 

following, recommendations appreciated. Continue dialysis per nephrologist. 

Continue Lasix. Continue Nephrovite and phoslo.  Patient for AV fistula 

tomorrow. 


2. Hyperkalemia. Resolved. Continue to monitor.


3. Anemia likely secondary to chronic disease. Continue Iron infusions. Continue

Aranesp weekly. Patient to be transfused 1 unit PRBC today as discussed with Dr. Anton (nephrology) for procedure tomorrow. Pending stool OB. 


4. Hypertension. Continue Norvasc, Lasix, Labetalol, and Cozaar                 

                                                    


5. DM2. Continue insulin sliding scale. Continue to monitor accuchecks.


6. Hyperlipidemia. Continue Lipitor.


7. Constipation. Started on Senna at bedtime. 


7. GI prophylaxis. Protonix


8. Paitent is a full code. 


Case was discussed in detail with the patient regarding current diagnosis and 

treatment plan. 


All questions answered. Case also discussed with nephrologist Dr. Anton.

## 2019-01-14 NOTE — CON
DATE:  01/13/2019



LOCATION:  The patient is in room 567, bed 2.



This consult is done on behalf of Dr. James whom we are covering.



The patient with known case of renal failure, on dialysis; diabetes

mellitus; hypertension; chronic anemia; former smoker; was admitted with

shortness of breath and had received an extra dialysis and he feels better.

He also developed some swelling of legs and now he is lying flat in bed and

feeling better.  Consult has been requested for cardiac risk stratification

for AV fistula formation.  The patient clinically denies any chest pain,

palpitation, or shortness of breath.  The patient had an echocardiogram on

11/30/2018, which showed dilated left atrium, normal LV size, and normal LV

systolic function with LV ejection fraction of 52%.  The patient had

moderate-to-severe mitral regurgitation and RVSP was 52 mmHg, mild

tricuspid regurgitation.



Detailed consult has been written by Mary Feng.



The patient's EKG showed normal sinus rhythm.  Chest x-ray is clear showing

the dialysis catheter.



Our impression is that clinically cardiac status is stable and the

echocardiogram showed LV ejection fraction of 52%, so from cardiac point of

view the patient can go for AV fistula formation as moderate risk and Dr. James will follow with the patient starting tomorrow.







__________________________________________

Danny Butts MD





DD:  01/13/2019 17:03:36

DT:  01/13/2019 18:07:01

Job # [id number]

## 2019-01-14 NOTE — CP.PCM.PN
<Juan Woodall - Last Filed: 01/14/19 07:52>





Subjective





- Date & Time of Evaluation


Date of Evaluation: 01/14/19


Time of Evaluation: 07:52





- Subjective


Subjective: 





Surgery: Dr. Clarke





Pt seen and examined. No acute events overnight. Pt resting comfortably in bed.





Objective





- Vital Signs/Intake and Output


Vital Signs (last 24 hours): 


                                        











Temp Pulse Resp BP Pulse Ox


 


 98.6 F   82   18   138/70   97 


 


 01/13/19 23:18  01/13/19 23:18  01/13/19 23:18  01/13/19 23:18  01/13/19 23:18











- Medications


Medications: 


                               Current Medications





Amlodipine Besylate (Norvasc)  10 mg PO DAILY [facility]


   Last Admin: 01/13/19 09:11 Dose:  10 mg


Atorvastatin Calcium (Lipitor)  40 mg PO HS [facility]


   Last Admin: 01/13/19 21:50 Dose:  40 mg


Calcium Acetate (Phoslo)  1,334 mg PO WM [facility]


   Last Admin: 01/13/19 17:36 Dose:  1,334 mg


Darbepoetin Iglesia (Aranesp)  100 mcg IVP QWK [facility]


   Last Admin: 01/11/19 09:39 Dose:  100 mcg


Dextrose (Dextrose 50% Inj)  0 ml IV STAT PRN; Protocol


   PRN Reason: Hypoglycemia Protocol


Ergocalciferol (Drisdol 50,000 Intl Units Cap)  1 cap PO Q7D [facility]


   Last Admin: 01/10/19 11:41 Dose:  1 cap


Furosemide (Lasix)  80 mg PO MWF [facility]


Heparin Sodium (Porcine) (Heparin)  2,000 units IVP TTS [facility]; Protocol


Dextrose (Dextrose 5% In Water 1000 Ml)  1,000 mls @ 0 mls/hr IV .Q0M PRN; 

Protocol


   PRN Reason: Hypoglycemia Protocol


Iron Sucrose 100 mg/ Sodium (Chloride)  105 mls @ 210 mls/hr IVPB DAILY [facility]


   Stop: 01/20/19 11:16


   Last Admin: 01/13/19 10:43 Dose:  210 mls/hr


Insulin Human Lispro (Humalog Low)  0 units SC ACHS [facility]; Protocol


   Last Admin: 01/13/19 21:49 Dose:  Not Given


Labetalol HCl (Trandate)  200 mg PO BID [facility]


   Last Admin: 01/13/19 17:36 Dose:  200 mg


Losartan Potassium (Cozaar)  50 mg PO QPM [facility]


   Last Admin: 01/13/19 17:36 Dose:  50 mg


Pantoprazole Sodium (Protonix Ec Tab)  40 mg PO ACB [facility]


   Last Admin: 01/13/19 09:12 Dose:  40 mg


Vitamin B Complex/Vit C/Folic Acid (Nephro-Dave)  1 tab PO 0800 [facility]


   Last Admin: 01/13/19 09:11 Dose:  1 tab











- Labs


Labs: 


                                        





                                 01/14/19 06:35 





                                 01/14/19 06:35 





                                        











PT  12.0 SECONDS (9.4-12.5)   01/14/19  06:35    


 


INR  1.04   01/14/19  06:35    


 


APTT  27.2 Seconds (25.1-36.5)   01/14/19  06:35    














- Constitutional


Appears: Non-toxic, No Acute Distress





- Head Exam


Head Exam: ATRAUMATIC, NORMOCEPHALIC





- Eye Exam


Eye Exam: EOMI





- ENT Exam


ENT Exam: Mucous Membranes Moist





- Neck Exam


Neck Exam: Full ROM





- Respiratory Exam


Respiratory Exam: NORMAL BREATHING PATTERN.  absent: Accessory Muscle Use, 

Respiratory Distress





- GI/Abdominal Exam


GI & Abdominal Exam: Soft.  absent: Tenderness





- Extremities Exam


Extremities Exam: absent: Calf Tenderness, Pedal Edema





- Neurological Exam


Neurological Exam: Alert, Awake, Oriented x3





Assessment and Plan





- Assessment and Plan (Free Text)


Assessment: 





53M w. ESRD needing AVF placement


Plan: 





-OR postponed until tomrrow 1/15


-Diet today


-NPO at MN


-d/w attending





Talisha PGY4





<Darin Clarke - Last Filed: 01/20/19 12:02>





Objective





- Vital Signs/Intake and Output


Vital Signs (last 24 hours): 


                                        











Temp Pulse Resp BP Pulse Ox


 


 98.9 F   86   18   145/73   99 


 


 01/16/19 14:00  01/16/19 17:34  01/16/19 14:00  01/16/19 17:34  01/16/19 14:00











- Labs


Labs: 


                                        





                                 01/16/19 08:04 





                                 01/16/19 08:04 





                                        











PT  12.0 SECONDS (9.4-12.5)   01/14/19  06:35    


 


INR  1.04   01/14/19  06:35    


 


APTT  27.2 Seconds (25.1-36.5)   01/14/19  06:35    














Assessment and Plan





- Assessment and Plan (Free Text)


Plan: 


Patient was seen, examined and evaluated by me. I agree with resident's 

assessment and plan.

## 2019-01-14 NOTE — CP.PCM.PN
Subjective





- Date & Time of Evaluation


Date of Evaluation: 01/14/19


Time of Evaluation: 15:04





- Subjective


Subjective: 





Nephrology Consultation Note: 





Assessment: stable


Uremia, missed HD/non-compliance, hyperkalemia, fluid overload, HTN emergency


Diabetic chronic Kidney Disease (E11.22) 


Hypertensive Chronic Kidney Disease (I12.0)


End stage renal disease (N18.6) dependence on hemodialysis (Z99.2) via PC


Anemia (D64.9), Hyperphosphatemia (E83.39), Secondary Hyperparathyroidism 

(E21.1), HTN (I12.0)





Plan:


Will plan for dialysis TTS schedule. Continue with Nephrovite 1 tab/day. 


PRBC as needed for anemia. on GIA weekly with dialysis as last Hb 7.0 TSAT 24% 

Ferritin 94, added 1 gram IV iron loading dose


Continue with phos binders, last phos level 8.4, repeat


discontinue with calcitriol. last  @ goal. added weekly Vit D as last 

level <12.8


BP control with meds as ordered. Patient on losartan. resume norvasc and 

labetalol. lasix on non HD days


Glycemic control, Dialysis consistent diet


Further work up/management as per primary team


Dose meds/antibiotics (if needed) for ESRD status. Avoid fleets enema/magnesium 

based laxatives.


pt was educated about risks of missing HD. 


SW consult for outpt HD placement


vascular surgery consult for AVF placement appreciated, planned for tuesday


pt stable for d/c from [facility] once arranged for outpt HD.





Thanks for allowing me to participate in care of your patient. Will follow 

patient with you. Please call if any Qs. had d/w team


Dr Otis Anton


Office: [phone] Cell: [phone] Fax: [phone]





Chief Complaint;feels better


HPI: Pt is a 53 M with hx of ESRD recently started on hemodialysis via 

permacathbut pt non compliant with HD, hadn't received HD since left from [facility] AMA , chronic anemia, hyperphosphatemia, secondary hyperparathyroidism, 

Diabetes Mellitus (30 years), hypertension presented with complaints of 

worsening SOB on exertion and HTN emergency


Renal consult requested for ESRD management. 


pt feels sick





ROS: 


Cardiovascular: No chest pain. 


Pulmonary: improved shortness of breath 


Gastrointestinal: denies abdominal pain No nausea. No vomiting. 


Genitourinary: No pain while urinating. Denies blood in urine. 


All other negative except as mentioned in HPI





Physical Examination:  


General Appearance: Comfortable, in no acute respiratory distress, co-operative 

. obese


Vitals reviewed and noted as below


Head; Atraumatic, normocephalic


ENT: no ulcers no thrush. Tongue is midline. Oropharynx: no rash or ulcers.


EYES: Pupils are equal, round and reactive to light accommodation. Eye muscles 

and extraocular movement intact. Sclera is anicteric.


Neck; supple no lymphadenopathy, no thyromegaly or bruit


Lungs: Normal respiratory rate/effort. Breath sounds bilateral reduced at bases 

clearer


Heart: Normal rate. s1s2 normal. No rub or gallop. 


Extremities: no edema. No varicose veins


Neurological: Patient is alert, awake and oriented to person, place and time. No

focal deficit. Strength bilateral appropriate and equal


Skin: Warm and dry. Normal turgor. No rash. Palpitation: Normal elasticity for 

age


Abdomen: Abdomen is soft. Bowel sounds +. There is no abdominal tenderness, no 

guarding/rigidity or organomegaly


Psych: improved insight and normal affect/mood


MSK: no joint tenderness or swelling. Digits and nails normal, no deformity


: kidney or bladder not palpable


Access: permacath





Labs/imaging reviewed.


Past medical history, past surgical history, family history, social history, 

allergy reviewed and noted as below


Family Hx: no hx of CKD. Non contributory 








Objective





- Vital Signs/Intake and Output


Vital Signs (last 24 hours): 


                                        











Temp Pulse Resp BP Pulse Ox


 


 99.2 F   84   20   152/71 H  99 


 


 01/14/19 06:00  01/14/19 09:19  01/14/19 06:00  01/14/19 09:22  01/14/19 06:00











- Medications


Medications: 


                               Current Medications





Amlodipine Besylate (Norvasc)  10 mg PO DAILY [facility]


   Last Admin: 01/14/19 09:22 Dose:  10 mg


Atorvastatin Calcium (Lipitor)  40 mg PO HS [facility]


   Last Admin: 01/13/19 21:50 Dose:  40 mg


Calcium Acetate (Phoslo)  1,334 mg PO WM [facility]


   Last Admin: 01/14/19 12:22 Dose:  1,334 mg


Darbepoetin Iglesia (Aranesp)  100 mcg IVP QWK [facility]


   Last Admin: 01/11/19 09:39 Dose:  100 mcg


Dextrose (Dextrose 50% Inj)  0 ml IV STAT PRN; Protocol


   PRN Reason: Hypoglycemia Protocol


Ergocalciferol (Drisdol 50,000 Intl Units Cap)  1 cap PO Q7D [facility]


   Last Admin: 01/10/19 11:41 Dose:  1 cap


Furosemide (Lasix)  80 mg PO MWF [facility]


   Last Admin: 01/14/19 09:20 Dose:  80 mg


Heparin Sodium (Porcine) (Heparin)  2,000 units IVP TTS [facility]; Protocol


Dextrose (Dextrose 5% In Water 1000 Ml)  1,000 mls @ 0 mls/hr IV .Q0M PRN; 

Protocol


   PRN Reason: Hypoglycemia Protocol


Iron Sucrose 100 mg/ Sodium (Chloride)  105 mls @ 210 mls/hr IVPB DAILY [facility]


   Stop: 01/20/19 11:16


   Last Admin: 01/14/19 10:33 Dose:  210 mls/hr


Insulin Human Lispro (Humalog Low)  0 units SC ACHS [facility]; Protocol


   Last Admin: 01/14/19 12:08 Dose:  Not Given


Labetalol HCl (Trandate)  200 mg PO BID [facility]


   Last Admin: 01/14/19 09:19 Dose:  200 mg


Losartan Potassium (Cozaar)  50 mg PO QPM [facility]


   Last Admin: 01/13/19 17:36 Dose:  50 mg


Pantoprazole Sodium (Protonix Ec Tab)  40 mg PO ACB [facility]


   Last Admin: 01/14/19 09:19 Dose:  40 mg


Senna/Docusate Sodium (Senokot S 50 Mg-8.6 Mg)  2 tab PO HS [facility]


Vitamin B Complex/Vit C/Folic Acid (Nephro-Dave)  1 tab PO 0800 [facility]


   Last Admin: 01/14/19 09:21 Dose:  1 tab











- Labs


Labs: 


                                        





                                 01/14/19 06:35 





                                 01/14/19 06:35 





                                        











PT  12.0 SECONDS (9.4-12.5)   01/14/19  06:35    


 


INR  1.04   01/14/19  06:35    


 


APTT  27.2 Seconds (25.1-36.5)   01/14/19  06:35

## 2019-01-14 NOTE — PN
DATE:  01/14/2019



SUBJECTIVE:  The patient denies any chest pain.



PHYSICAL EXAMINATION:

VITAL SIGNS:  Blood pressure 150/71, heart rate 84, temperature 99.2,

respiration 20.

HEENT:  Normocephalic.

CHEST:  Clear.

HEART:  S1 and S2, regular.

EXTREMITIES:  No edema.



LABORATORY DATA:  Chest x-ray revealed bilateral mid and lower lung zone

haziness.



Today's BUN and creatinine 53 and 9.5 respectively.  Glucose is 115. 

Today's hemoglobin and hematocrit 7 and 20.3.



EKG revealed normal sinus rhythm at rate of 92.



Echocardiograph study in November of last year revealed mild-to-moderate

concentric LVH, moderate to severe mild MR and normal ejection fraction.



ASSESSMENT:

1.  Status post volume overload.

2.  End-stage renal disease on hemodialysis.

3.  Anemia.



RECOMMENDATIONS:  Continue Cozaar 50 mg once a day, Lasix at 80 mg p.o.

Monday, Wednesday and Friday, Norvasc 10 mg once a day, labetalol 500 mg

once a day.  The patient will undergo hemodialysis tomorrow and the case is

in the process for scheduling the patient for outpatient dialysis center as

this has not been happening since the patient was placed on dialysis one

month ago and the reason for that because the patient lost his [place]

Medicaid coverage possibly because of relocating.







__________________________________________

William James MD





DD:  01/14/2019 11:23:22

DT:  01/14/2019 11:26:11

Job # [id number]

## 2019-01-15 NOTE — CP.PCM.PN
Subjective





- Date & Time of Evaluation


Date of Evaluation: 01/15/19


Time of Evaluation: 12:42





- Subjective


Subjective: 





Nephrology Consultation Note: 





Assessment: stable


Uremia, missed HD/non-compliance, hyperkalemia, fluid overload, HTN emergency


Diabetic chronic Kidney Disease (E11.22) 


Hypertensive Chronic Kidney Disease (I12.0)


End stage renal disease (N18.6) dependence on hemodialysis (Z99.2) via PC


Anemia (D64.9), Hyperphosphatemia (E83.39), Secondary Hyperparathyroidism 

(E21.1), HTN (I12.0)





Plan:


Will plan for dialysis TTS schedule. Continue with Nephrovite 1 tab/day. 


PRBC as needed for anemia. on GIA weekly with dialysis as last Hb 8.40 TSAT 24% 

Ferritin 94, added 1 gram IV iron loading dose


Continue with phos binders, last phos level 6.4


discontinue with calcitriol. last  @ goal. added weekly Vit D as last 

level <12.8


BP control with meds as ordered. Patient on losartan. resume norvasc and 

labetalol. lasix on non HD days


Glycemic control, Dialysis consistent diet


Further work up/management as per primary team


Dose meds/antibiotics (if needed) for ESRD status. Avoid fleets enema/magnesium 

based laxatives.


pt was educated about risks of missing HD. 


SW consult for outpt HD placement


vascular surgery consult for AVF placement appreciated, done 1/15/19


pt stable for d/c from [facility] once arranged for outpt HD.





Thanks for allowing me to participate in care of your patient. Will follow 

patient with you. Please call if any Qs. had d/w team


Dr Otis Anton


Office: [phone] Cell: [phone] Fax: [phone]





Chief Complaint;feels better


HPI: Pt is a 53 M with hx of ESRD recently started on hemodialysis via 

permacathbut pt non compliant with HD, hadn't received HD since left from 

hospital AMA , chronic anemia, hyperphosphatemia, secondary hyperparathyroidism,

Diabetes Mellitus (30 years), hypertension presented with complaints of 

worsening SOB on exertion and HTN emergency


Renal consult requested for ESRD management. 


pt feels sick





ROS: 


Cardiovascular: No chest pain. 


Pulmonary: improved shortness of breath 


Gastrointestinal: denies abdominal pain No nausea. No vomiting. 


Genitourinary: No pain while urinating. Denies blood in urine. 


All other negative except as mentioned in HPI





Physical Examination:  seen on HD


General Appearance: Comfortable, in no acute respiratory distress, co-operative 

. obese


Vitals reviewed and noted as below


Head; Atraumatic, normocephalic


ENT: no ulcers no thrush. Tongue is midline. Oropharynx: no rash or ulcers.


EYES: Pupils are equal, round and reactive to light accommodation. Eye muscles 

and extraocular movement intact. Sclera is anicteric.


Neck; supple no lymphadenopathy, no thyromegaly or bruit


Lungs: Normal respiratory rate/effort. Breath sounds bilateral reduced at bases 

clearer


Heart: Normal rate. s1s2 normal. No rub or gallop. 


Extremities: no edema. No varicose veins


Neurological: Patient is alert, awake and oriented to person, place and time. No

focal deficit. Strength bilateral appropriate and equal


Skin: Warm and dry. Normal turgor. No rash. Palpitation: Normal elasticity for 

age


Abdomen: Abdomen is soft. Bowel sounds +. There is no abdominal tenderness, no 

guarding/rigidity or organomegaly


Psych: improved insight and normal affect/mood


MSK: no joint tenderness or swelling. Digits and nails normal, no deformity


: kidney or bladder not palpable


Access: permacath. left wrist AVF with thrill and burit +





Labs/imaging reviewed.


Past medical history, past surgical history, family history, social history, 

allergy reviewed and noted as below


Family Hx: no hx of CKD. Non contributory 








Objective





- Vital Signs/Intake and Output


Vital Signs (last 24 hours): 


                                        











Temp Pulse Resp BP Pulse Ox


 


 98.3 F   77   18   152/71 H  95 


 


 01/15/19 11:40  01/15/19 11:40  01/15/19 11:40  01/15/19 11:40  01/15/19 11:40








Intake and Output: 


                                        











 01/15/19 01/15/19





 06:59 18:59


 


Intake Total 240 


 


Balance 240 














- Medications


Medications: 


                               Current Medications





Amlodipine Besylate (Norvasc)  10 mg PO DAILY [facility]


   Last Admin: 01/14/19 09:22 Dose:  10 mg


Atorvastatin Calcium (Lipitor)  40 mg PO HS [facility]


   Last Admin: 01/14/19 21:15 Dose:  40 mg


Calcium Acetate (Phoslo)  2,001 mg PO [facility]


Darbepoetin Iglesia (Aranesp)  100 mcg IVP QWK [facility]


   Last Admin: 01/11/19 09:39 Dose:  100 mcg


Dextrose (Dextrose 50% Inj)  0 ml IV STAT PRN; Protocol


   PRN Reason: Hypoglycemia Protocol


Ergocalciferol (Drisdol 50,000 Intl Units Cap)  1 cap PO Q7D [facility]


   Last Admin: 01/10/19 11:41 Dose:  1 cap


Furosemide (Lasix)  80 mg PO MWF [facility]


   Last Admin: 01/14/19 09:20 Dose:  80 mg


Heparin Sodium (Porcine) (Heparin)  2,000 units IVP TTS [facility]; Protocol


Dextrose (Dextrose 5% In Water 1000 Ml)  1,000 mls @ 0 mls/hr IV .Q0M PRN; Pro

tocol


   PRN Reason: Hypoglycemia Protocol


Iron Sucrose 100 mg/ Sodium (Chloride)  105 mls @ 210 mls/hr IVPB DAILY [facility]


   Stop: 01/20/19 11:16


   Last Admin: 01/14/19 10:33 Dose:  210 mls/hr


Insulin Human Lispro (Humalog Low)  0 units SC ACHS [facility]; Protocol


   Last Admin: 01/15/19 06:34 Dose:  Not Given


Labetalol HCl (Trandate)  200 mg PO BID [facility]


   Last Admin: 01/14/19 17:25 Dose:  200 mg


Losartan Potassium (Cozaar)  50 mg PO QPM [facility]


   Last Admin: 01/14/19 17:25 Dose:  50 mg


Metoclopramide HCl (Reglan)  10 mg IV ONCE PRN


   PRN Reason: Nausea/Vomiting


Oxycodone/Acetaminophen (Percocet 5/325 Mg Tab)  1 tab PO Q4H PRN


   PRN Reason: Pain, moderate (4-7)


   Stop: 01/18/19 10:17


Pantoprazole Sodium (Protonix Ec Tab)  40 mg PO ACB [facility]


   Last Admin: 01/14/19 09:19 Dose:  40 mg


Senna/Docusate Sodium (Senokot S 50 Mg-8.6 Mg)  2 tab PO HS [facility]


   Last Admin: 01/14/19 21:15 Dose:  2 tab


Vitamin B Complex/Vit C/Folic Acid (Nephro-Dave)  1 tab PO 0800 [facility]


   Last Admin: 01/14/19 09:21 Dose:  1 tab











- Labs


Labs: 


                                        





                                 01/15/19 06:50 





                                 01/15/19 06:50 





                                        











PT  12.0 SECONDS (9.4-12.5)   01/14/19  06:35    


 


INR  1.04   01/14/19  06:35    


 


APTT  27.2 Seconds (25.1-36.5)   01/14/19  06:35

## 2019-01-15 NOTE — CP.PCM.PN
<RobertAlec Harrington - Last Filed: 01/15/19 15:01>





Subjective





- Date & Time of Evaluation


Date of Evaluation: 01/15/19


Time of Evaluation: 06:40





- Subjective


Subjective: 





Pt seen and examined this morning at bedside. Pt states he moved his bowels 

recently, no new complaints





Objective





- Vital Signs/Intake and Output


Vital Signs (last 24 hours): 


                                        











Temp Pulse Resp BP Pulse Ox


 


 98.3 F   77   18   152/71 H  95 


 


 01/15/19 11:40  01/15/19 11:40  01/15/19 11:40  01/15/19 11:40  01/15/19 11:40








Intake and Output: 


                                        











 01/15/19 01/15/19





 06:59 18:59


 


Intake Total 240 


 


Balance 240 














- Medications


Medications: 


                               Current Medications





Amlodipine Besylate (Norvasc)  10 mg PO DAILY [facility]


   Last Admin: 01/14/19 09:22 Dose:  10 mg


Atorvastatin Calcium (Lipitor)  40 mg PO HS [facility]


   Last Admin: 01/14/19 21:15 Dose:  40 mg


Calcium Acetate (Phoslo)  2,001 mg PO WM [facility]


Darbepoetin Iglesia (Aranesp)  100 mcg IVP QWK [facility]


   Last Admin: 01/11/19 09:39 Dose:  100 mcg


Dextrose (Dextrose 50% Inj)  0 ml IV STAT PRN; Protocol


   PRN Reason: Hypoglycemia Protocol


Ergocalciferol (Drisdol 50,000 Intl Units Cap)  1 cap PO Q7D [facility]


   Last Admin: 01/10/19 11:41 Dose:  1 cap


Furosemide (Lasix)  80 mg PO MWF [facility]


   Last Admin: 01/14/19 09:20 Dose:  80 mg


Heparin Sodium (Porcine) (Heparin)  2,000 units IVP TTS [facility]; Protocol


Dextrose (Dextrose 5% In Water 1000 Ml)  1,000 mls @ 0 mls/hr IV .Q0M PRN; 

Protocol


   PRN Reason: Hypoglycemia Protocol


Iron Sucrose 100 mg/ Sodium (Chloride)  105 mls @ 210 mls/hr IVPB DAILY [facility]


   Stop: 01/20/19 11:16


   Last Admin: 01/14/19 10:33 Dose:  210 mls/hr


Insulin Human Lispro (Humalog Low)  0 units SC ACHS [facility]; Protocol


   Last Admin: 01/15/19 06:34 Dose:  Not Given


Labetalol HCl (Trandate)  200 mg PO BID [facility]


   Last Admin: 01/14/19 17:25 Dose:  200 mg


Losartan Potassium (Cozaar)  50 mg PO QPM [facility]


   Last Admin: 01/14/19 17:25 Dose:  50 mg


Metoclopramide HCl (Reglan)  10 mg IV ONCE PRN


   PRN Reason: Nausea/Vomiting


Oxycodone/Acetaminophen (Percocet 5/325 Mg Tab)  1 tab PO Q4H PRN


   PRN Reason: Pain, moderate (4-7)


   Stop: 01/18/19 10:17


Pantoprazole Sodium (Protonix Ec Tab)  40 mg PO ACB [facility]


   Last Admin: 01/14/19 09:19 Dose:  40 mg


Senna/Docusate Sodium (Senokot S 50 Mg-8.6 Mg)  2 tab PO HS [facility]


   Last Admin: 01/14/19 21:15 Dose:  2 tab


Vitamin B Complex/Vit C/Folic Acid (Nephro-Dave)  1 tab PO 0800 [facility]


   Last Admin: 01/14/19 09:21 Dose:  1 tab











- Labs


Labs: 


                                        





                                 01/15/19 06:50 





                                 01/15/19 06:50 





                                        











PT  12.0 SECONDS (9.4-12.5)   01/14/19  06:35    


 


INR  1.04   01/14/19  06:35    


 


APTT  27.2 Seconds (25.1-36.5)   01/14/19  06:35    














- Constitutional


Appears: No Acute Distress





- Head Exam


Head Exam: ATRAUMATIC, NORMOCEPHALIC





- Eye Exam


Eye Exam: EOMI





- ENT Exam


ENT Exam: Mucous Membranes Moist





- Neck Exam


Neck Exam: Full ROM





- Cardiovascular Exam


Cardiovascular Exam: REGULAR RHYTHM, RRR, +S1, +S2.  absent: Diastolic murmur, 

Murmur





- GI/Abdominal Exam


GI & Abdominal Exam: Soft, Normal Bowel Sounds





- Extremities Exam


Extremities Exam: Full ROM





- Neurological Exam


Neurological Exam: Alert, Awake, Oriented x3





- Psychiatric Exam


Psychiatric exam: Normal Affect, Normal Mood





- Skin


Skin Exam: Dry, Intact, Normal Color, Warm





Assessment and Plan





- Assessment and Plan (Free Text)


Assessment: 





Pt is a 52yo male with a PMH of HTN, DM2, hepatic hemangioma, CKD on HD 

originally scheduled for Tues/ Thurs/ Sat with poor compliance who presents to 

[facility] ED complaining of fatigue, SOB, productive cough who was found to be volume 

overloaded. Pt stabilized in ICU, received HD, and transferred to med/surg. Pt 

undergoes HD while in the hospital, out pt is in the process of being arranged. 


Plan: 





CKD Stage V requiring HD, history of noncompliance  


- Pt tolerating dialysis well Tue, Thurs, Sat


- Cr 10.2, BUN 66 


- Renal Diet


- Etiology was failure for outpatient hemodialysis due to non-compliance, 

Outpatient HD in process of being arranged  


- fistula surgery today, no complications  


- Nephrology consulted





Anemia


- Continue IV Fe and Epogen


- Hgb 8.5


- transfused 1 unit pRBC, prior to fistula surgery 


- FOBT, NEGATIVE





History of HTN


- Continue labetalol, Norvasc, cozaar


- Repeat echo in one month





DM2


- ISS


- ACHS





HLD


- lipitor





Constipation


- senna PRN





Ppx


- Protonix


- Heparin 








Pt seen, examined, assessment and plan discussed with Dr Anitra Jones PGY1, Internal Medicine Resident 








<Anitra Alfaro R - Last Filed: 01/16/19 09:23>





Objective





- Vital Signs/Intake and Output


Vital Signs (last 24 hours): 


                                        











Temp Pulse Resp BP Pulse Ox


 


 99 F   75   20   151/71 H  97 


 


 01/16/19 06:00  01/16/19 06:00  01/16/19 06:00  01/16/19 06:00  01/16/19 06:00








Intake and Output: 


                                        











 01/16/19 01/16/19





 06:59 18:59


 


Intake Total 480 


 


Output Total 150 


 


Balance 330 














- Medications


Medications: 


                               Current Medications





Amlodipine Besylate (Norvasc)  10 mg PO DAILY [facility]


   Last Admin: 01/15/19 10:42 Dose:  Not Given


Atorvastatin Calcium (Lipitor)  40 mg PO HS [facility]


   Last Admin: 01/15/19 21:20 Dose:  40 mg


Calcium Acetate (Phoslo)  2,001 mg PO WM [facility]


   Last Admin: 01/15/19 17:49 Dose:  2,001 mg


Darbepoetin Iglesia (Aranesp)  100 mcg IVP QWK [facility]


   Last Admin: 01/11/19 09:39 Dose:  100 mcg


Dextrose (Dextrose 50% Inj)  0 ml IV STAT PRN; Protocol


   PRN Reason: Hypoglycemia Protocol


Ergocalciferol (Drisdol 50,000 Intl Units Cap)  1 cap PO Q7D [facility]


   Last Admin: 01/10/19 11:41 Dose:  1 cap


Furosemide (Lasix)  80 mg PO MWF [facility]


   Last Admin: 01/14/19 09:20 Dose:  80 mg


Heparin Sodium (Porcine) (Heparin)  2,000 units IVP TTS [facility]; Protocol


Dextrose (Dextrose 5% In Water 1000 Ml)  1,000 mls @ 0 mls/hr IV .Q0M PRN; 

Protocol


   PRN Reason: Hypoglycemia Protocol


Iron Sucrose 100 mg/ Sodium (Chloride)  105 mls @ 210 mls/hr IVPB DAILY [facility]


   Stop: 01/20/19 11:16


   Last Admin: 01/15/19 17:50 Dose:  210 mls/hr


Insulin Human Lispro (Humalog Low)  0 units SC ACHS [facility]; Protocol


   Last Admin: 01/15/19 22:25 Dose:  1 unit


Labetalol HCl (Trandate)  200 mg PO BID [facility]


   Last Admin: 01/15/19 17:49 Dose:  200 mg


Losartan Potassium (Cozaar)  50 mg PO QPM [facility]


   Last Admin: 01/15/19 17:49 Dose:  50 mg


Metoclopramide HCl (Reglan)  10 mg IV ONCE PRN


   PRN Reason: Nausea/Vomiting


Oxycodone/Acetaminophen (Percocet 5/325 Mg Tab)  1 tab PO Q4H PRN


   PRN Reason: Pain, moderate (4-7)


   Stop: 01/18/19 10:17


Pantoprazole Sodium (Protonix Ec Tab)  40 mg PO ACB [facility]


   Last Admin: 01/15/19 08:43 Dose:  Not Given


Senna/Docusate Sodium (Senokot S 50 Mg-8.6 Mg)  2 tab PO HS [facility]


   Last Admin: 01/15/19 21:20 Dose:  2 tab


Vitamin B Complex/Vit C/Folic Acid (Nephro-Dave)  1 tab PO 0800 [facility]


   Last Admin: 01/15/19 10:41 Dose:  Not Given











- Labs


Labs: 


                                        





                                 01/16/19 08:04 





                                 01/16/19 08:04 





                                        











PT  12.0 SECONDS (9.4-12.5)   01/14/19  06:35    


 


INR  1.04   01/14/19  06:35    


 


APTT  27.2 Seconds (25.1-36.5)   01/14/19  06:35    














Attending/Attestation





- Attestation


I have personally seen and examined this patient.: Yes


I have fully participated in the care of the patient.: Yes


I have reviewed all pertinent clinical information, including history, physical 

exam and plan: Yes


Notes (Text): 


Patient seen and examined by me with resident at 12:50AM on 1/15/19.  Case 

including HPI, physical exam, and assessment and plan discussed with resident. YUNIER stroud with above with following additions/corrections.


Patient is a 53 year old male with past medical history significant for DM2, 

hypertension, ESRD on HD T/Th/Sat with noncompliance, renal mass, and hepatic 

hemangioma that presented to the emergency room with shortness of breath and 

cough that started one month ago.


Patient seen in dialysis. Patient s/p AV fistula placement. Patient states that 

procedure went well. Patient is denying any current pain at fistula site. 

Patient is now having bowel movements. He denies shortness of breath.  No chest 

pain or palpitations. No headaches or dizziness. No fevers or chills.  No 

nausea, vomiting, or abdominal pain. No dysuria. 


Physical exam:


General: Awake and alert sitting up in bed in no acute distress


HEENT: Normocephalic, atraumatic. Extraocular muscles intact, pupils equal and 

reactive, no scleral icterus. Oropharynx is pink and moist. No pharyngeal 

erythema or exudate appreciated. Neck is supple. 


Cardiovascular: Regular rhythm. Normal S1 and S2. No murmurs, rubs, or gallops 

appreciated


Pulmonary: Normal respiratory effort. No rhonchi, rales, or wheezing 

appreciated.


Gastrointestinal: Soft, nondistended. Nontender. Positive bowel sounds all 4 

quadrants. No guarding. 


Musculoskeletal:  Moves all extremities. No calf tenderness. No edema 

appreciated. 


Central nervous system: AAOx3, CN 2-12 grossly intact. 


Dermatologic:  Skin warm and dry.  left wrist area incision site clean,dry, and 

intact. 


Assessment and plan: Patient is a 53 year old male with past medical history 

significant for DM2, hypertension, ESRD on HD T/Th/Sat with noncompliance, renal

mass, and hepatic hemangioma that presented to the emergency room with shortness

of breath and cough that started one month ago.


1. Shortness of breath and lower extremity edema secondary to fluid overload 

secondary to noncompliance with dialysis. Nephrologist following, recomm

endations appreciated. Continue dialysis per nephrologist. Continue Lasix. 

Continue Nephrovite and phoslo. S/P Av fistula placement today. 


2. Hyperkalemia. Resolved. Continue to monitor.


3. Anemia likely secondary to chronic disease. Continue Iron infusions. Continue

Aranesp weekly. S/P 1 unit PRBC 1/14/19. Stool OB negative.


4. Hypertension. Continue Norvasc, Lasix, Labetalol, and Cozaar                 

                                                    


5. DM2. Continue insulin sliding scale. Continue to monitor accuchecks.


6. Hyperlipidemia. Continue Lipitor.


7. Constipation. Started on Senna at bedtime. 


7. GI prophylaxis. Protonix


8. Paitent is a full code. 


Case was discussed in detail with the patient regarding current diagnosis and 

treatment plan. 


All questions answered. Case also discussed with nephrologist Dr. Anton.

## 2019-01-15 NOTE — PN
DATE:  01/15/2019



SUBJECTIVE:  The patient denies chest pain or shortness of breath.  He is

currently in the PACU, recovering from left forearm AV fistula.  He is

hemodynamically stable and sinus rhythm on the monitor.



PHYSICAL EXAMINATION:

VITAL SIGNS:  Blood pressure 151/81, heart rate 76, temperature 98.3,

respiration 18.

HEENT:  Pale conjunctivae.

CHEST:  Clear.

HEART:  S1 and S2, regular.

EXTREMITIES:  No edema.



LABORATORY DATA:  Today's hemoglobin and hematocrit 8.5 and 24.3 after

receiving 1 unit of packed RBC transfusion.  White count 11.4 and platelet

count 198,000.



Today's SMA-7:  Sodium 131, potassium 4.6, chloride 95, CO2 of 24, glucose

119, BUN 66, and creatinine 10.2.



ASSESSMENT:

1.  End-stage renal disease, recently initiated on hemodialysis.

2.  Status post left forearm arteriovenous fistula.

3.  Hypertension.

4.  Anemia.



RECOMMENDATIONS:  Continue heparin 2000 units intravenously GTTS, Lipitor

40 mg once a day, Lasix 80 mg p.o. Monday, Wednesday and Friday, Norvasc 10

mg once a day, PhosLo one tab take with meal, labetalol 200 mg twice a day.

Obtain postoperative EKG.  The patient will undergo hemodialysis today.







__________________________________________

William James MD





DD:  01/15/2019 11:16:22

DT:  01/15/2019 13:16:10

Job # [id number]

## 2019-01-15 NOTE — PCM.SURG1
Surgeon's Initial Post Op Note





- Surgeon's Notes


Surgeon: Gerald 


Assistant: Weston Horn,


Type of Anesthesia: General LMA, Local


Pre-Operative Diagnosis: End Stage Renal Disease needing Dialysis


Operative Findings: Good cephalic vein and radial artery


Post-Operative Diagnosis: Same


Operation Performed: Left side radiocephalic Arteriovenous Fistula


Specimen/Specimens Removed: None


Estimated Blood Loss: EBL {In ML}: 5


Date of Surgery/Procedure: 01/15/19


Time of Surgery/Procedure: 08:30

## 2019-01-16 NOTE — CP.PCM.PN
Subjective





- Date & Time of Evaluation


Date of Evaluation: 01/16/19


Time of Evaluation: 15:22





- Subjective


Subjective: 





Nephrology Consultation Note: 





Assessment: stable


Uremia, missed HD/non-compliance, hyperkalemia, fluid overload, HTN emergency


Diabetic chronic Kidney Disease (E11.22) 


Hypertensive Chronic Kidney Disease (I12.0)


End stage renal disease (N18.6) dependence on hemodialysis (Z99.2) via PC


Anemia (D64.9), Hyperphosphatemia (E83.39), Secondary Hyperparathyroidism 

(E21.1), HTN (I12.0)





Plan:


Will plan for dialysis TTS schedule. Continue with Nephrovite 1 tab/day. 


PRBC as needed for anemia. on GIA weekly with dialysis as last Hb 8.40 TSAT 24% 

Ferritin 94, added 1 gram IV iron loading dose


Continue with phos binders, last phos level 6.4


discontinue with calcitriol. last  @ goal. added weekly Vit D as last 

level <12.8


BP control with meds as ordered. Patient on losartan. resume norvasc and 

labetalol. lasix on non HD days


Glycemic control, Dialysis consistent diet


Further work up/management as per primary team


Dose meds/antibiotics (if needed) for ESRD status. Avoid fleets enema/magnesium 

based laxatives.


pt was educated about risks of missing HD. 


SW consult for outpt HD placement


vascular surgery consult for AVF placement appreciated, done 1/15/19


pt stable for d/c from renal perspective once arranged for outpt HD.





Thanks for allowing me to participate in care of your patient. Will follow 

patient with you. Please call if any Qs. had d/w team


Dr Otis Anton


Office: [phone] Cell: [phone] Fax: [phone]





Chief Complaint;feels better


HPI: Pt is a 53 M with hx of ESRD recently started on hemodialysis via 

permacathbut pt non compliant with HD, hadn't received HD since left from 

hospital AMA , chronic anemia, hyperphosphatemia, secondary hyperparathyroidism,

Diabetes Mellitus (30 years), hypertension presented with complaints of 

worsening SOB on exertion and HTN emergency


Renal consult requested for ESRD management. 


pt feels sick





ROS: 


Cardiovascular: No chest pain. 


Pulmonary: improved shortness of breath 


Gastrointestinal: denies abdominal pain No nausea. No vomiting. 


Genitourinary: No pain while urinating. Denies blood in urine. 


All other negative except as mentioned in HPI





Physical Examination:  


General Appearance: Comfortable, in no acute respiratory distress, co-operative 

. obese


Vitals reviewed and noted as below


Head; Atraumatic, normocephalic


ENT: no ulcers no thrush. Tongue is midline. Oropharynx: no rash or ulcers.


EYES: Pupils are equal, round and reactive to light accommodation. Eye muscles 

and extraocular movement intact. Sclera is anicteric.


Neck; supple no lymphadenopathy, no thyromegaly or bruit


Lungs: Normal respiratory rate/effort. Breath sounds bilateral reduced at bases 

clearer


Heart: Normal rate. s1s2 normal. No rub or gallop. 


Extremities: no edema. No varicose veins


Neurological: Patient is alert, awake and oriented to person, place and time. No

focal deficit. Strength bilateral appropriate and equal


Skin: Warm and dry. Normal turgor. No rash. Palpitation: Normal elasticity for 

age


Abdomen: Abdomen is soft. Bowel sounds +. There is no abdominal tenderness, no 

guarding/rigidity or organomegaly


Psych: improved insight and normal affect/mood


MSK: no joint tenderness or swelling. Digits and nails normal, no deformity


: kidney or bladder not palpable


Access: permacath. left wrist AVF with thrill and burit +





Labs/imaging reviewed.


Past medical history, past surgical history, family history, social history, 

allergy reviewed and noted as below


Family Hx: no hx of CKD. Non contributory 





Objective





- Vital Signs/Intake and Output


Vital Signs (last 24 hours): 


                                        











Temp Pulse Resp BP Pulse Ox


 


 99 F   75   20   151/71 H  97 


 


 01/16/19 06:00  01/16/19 06:00  01/16/19 06:00  01/16/19 09:50  01/16/19 06:00








Intake and Output: 


                                        











 01/16/19 01/16/19





 06:59 18:59


 


Intake Total 480 


 


Output Total 150 


 


Balance 330 














- Medications


Medications: 


                               Current Medications





Amlodipine Besylate (Norvasc)  10 mg PO DAILY [facility]


   Last Admin: 01/16/19 09:49 Dose:  10 mg


Atorvastatin Calcium (Lipitor)  40 mg PO HS [facility]


   Last Admin: 01/15/19 21:20 Dose:  40 mg


Calcium Acetate (Phoslo)  2,001 mg PO WM [facility]


   Last Admin: 01/16/19 11:37 Dose:  2,001 mg


Darbepoetin Iglesia (Aranesp)  100 mcg IVP QWK [facility]


   Last Admin: 01/11/19 09:39 Dose:  100 mcg


Dextrose (Dextrose 50% Inj)  0 ml IV STAT PRN; Protocol


   PRN Reason: Hypoglycemia Protocol


Ergocalciferol (Drisdol 50,000 Intl Units Cap)  1 cap PO Q7D [facility]


   Last Admin: 01/10/19 11:41 Dose:  1 cap


Furosemide (Lasix)  80 mg PO MWF [facility]


   Last Admin: 01/16/19 09:50 Dose:  80 mg


Heparin Sodium (Porcine) (Heparin)  2,000 units IVP TTS [facility]; Protocol


Dextrose (Dextrose 5% In Water 1000 Ml)  1,000 mls @ 0 mls/hr IV .Q0M PRN; 

Protocol


   PRN Reason: Hypoglycemia Protocol


Iron Sucrose 100 mg/ Sodium (Chloride)  105 mls @ 210 mls/hr IVPB DAILY [facility]


   Stop: 01/20/19 11:16


   Last Admin: 01/16/19 09:54 Dose:  210 mls/hr


Insulin Human Lispro (Humalog Low)  0 units SC ACHS [facility]; Protocol


   Last Admin: 01/16/19 08:00 Dose:  Not Given


Labetalol HCl (Trandate)  200 mg PO BID [facility]


   Last Admin: 01/16/19 09:53 Dose:  200 mg


Losartan Potassium (Cozaar)  50 mg PO QPM [facility]


   Last Admin: 01/15/19 17:49 Dose:  50 mg


Metoclopramide HCl (Reglan)  10 mg IV ONCE PRN


   PRN Reason: Nausea/Vomiting


Oxycodone/Acetaminophen (Percocet 5/325 Mg Tab)  1 tab PO Q4H PRN


   PRN Reason: Pain, moderate (4-7)


   Stop: 01/18/19 10:17


Pantoprazole Sodium (Protonix Ec Tab)  40 mg PO ACB [facility]


   Last Admin: 01/16/19 09:53 Dose:  40 mg


Senna/Docusate Sodium (Senokot S 50 Mg-8.6 Mg)  2 tab PO HS [facility]


   Last Admin: 01/15/19 21:20 Dose:  2 tab


Vitamin B Complex/Vit C/Folic Acid (Nephro-Dave)  1 tab PO 0800 [facility]


   Last Admin: 01/16/19 09:50 Dose:  1 tab











- Labs


Labs: 


                                        





                                 01/16/19 08:04 





                                 01/16/19 08:04 





                                        











PT  12.0 SECONDS (9.4-12.5)   01/14/19  06:35    


 


INR  1.04   01/14/19  06:35    


 


APTT  27.2 Seconds (25.1-36.5)   01/14/19  06:35

## 2019-01-16 NOTE — CP.PCM.PN
<Yina Alejandre L - Last Filed: 01/16/19 14:59>





Subjective





- Date & Time of Evaluation


Date of Evaluation: 01/16/19


Time of Evaluation: 11:03





- Subjective


Subjective: 





Resident Progress Note for Surgery: Dr. Clarke





Patient examined at bedside. No acute events overnight. Patient is POD#1 s/p AV 

fistula placement in LUE. Patient is resting comfortably in bed in no acute 

distress. States that he is doing well and offers no complaints. Denies fevers, 

chills, shortness of breath, pain.  





Objective





- Vital Signs/Intake and Output


Vital Signs (last 24 hours): 


                                        











Temp Pulse Resp BP Pulse Ox


 


 99 F   75   20   151/71 H  97 


 


 01/16/19 06:00  01/16/19 06:00  01/16/19 06:00  01/16/19 09:50  01/16/19 06:00








Intake and Output: 


                                        











 01/16/19 01/16/19





 06:59 18:59


 


Intake Total 480 


 


Output Total 150 


 


Balance 330 














- Medications


Medications: 


                               Current Medications





Amlodipine Besylate (Norvasc)  10 mg PO DAILY [facility]


   Last Admin: 01/16/19 09:49 Dose:  10 mg


Atorvastatin Calcium (Lipitor)  40 mg PO HS [facility]


   Last Admin: 01/15/19 21:20 Dose:  40 mg


Calcium Acetate (Phoslo)  2,001 mg PO WM [facility]


   Last Admin: 01/15/19 17:49 Dose:  2,001 mg


Darbepoetin Iglesia (Aranesp)  100 mcg IVP QWK [facility]


   Last Admin: 01/11/19 09:39 Dose:  100 mcg


Dextrose (Dextrose 50% Inj)  0 ml IV STAT PRN; Protocol


   PRN Reason: Hypoglycemia Protocol


Ergocalciferol (Drisdol 50,000 Intl Units Cap)  1 cap PO Q7D [facility]


   Last Admin: 01/10/19 11:41 Dose:  1 cap


Furosemide (Lasix)  80 mg PO MWF [facility]


   Last Admin: 01/16/19 09:50 Dose:  80 mg


Heparin Sodium (Porcine) (Heparin)  2,000 units IVP TTS [facility]; Protocol


Dextrose (Dextrose 5% In Water 1000 Ml)  1,000 mls @ 0 mls/hr IV .Q0M PRN; 

Protocol


   PRN Reason: Hypoglycemia Protocol


Iron Sucrose 100 mg/ Sodium (Chloride)  105 mls @ 210 mls/hr IVPB DAILY [facility]


   Stop: 01/20/19 11:16


   Last Admin: 01/16/19 09:54 Dose:  210 mls/hr


Insulin Human Lispro (Humalog Low)  0 units SC ACHS [facility]; Protocol


   Last Admin: 01/16/19 08:00 Dose:  Not Given


Labetalol HCl (Trandate)  200 mg PO BID [facility]


   Last Admin: 01/16/19 09:53 Dose:  200 mg


Losartan Potassium (Cozaar)  50 mg PO QPM [facility]


   Last Admin: 01/15/19 17:49 Dose:  50 mg


Metoclopramide HCl (Reglan)  10 mg IV ONCE PRN


   PRN Reason: Nausea/Vomiting


Oxycodone/Acetaminophen (Percocet 5/325 Mg Tab)  1 tab PO Q4H PRN


   PRN Reason: Pain, moderate (4-7)


   Stop: 01/18/19 10:17


Pantoprazole Sodium (Protonix Ec Tab)  40 mg PO ACB [facility]


   Last Admin: 01/16/19 09:53 Dose:  40 mg


Senna/Docusate Sodium (Senokot S 50 Mg-8.6 Mg)  2 tab PO HS [facility]


   Last Admin: 01/15/19 21:20 Dose:  2 tab


Vitamin B Complex/Vit C/Folic Acid (Nephro-Dave)  1 tab PO 0800 [facility]


   Last Admin: 01/16/19 09:50 Dose:  1 tab











- Labs


Labs: 


                                        





                                 01/16/19 08:04 





                                 01/16/19 08:04 





                                        











PT  12.0 SECONDS (9.4-12.5)   01/14/19  06:35    


 


INR  1.04   01/14/19  06:35    


 


APTT  27.2 Seconds (25.1-36.5)   01/14/19  06:35    














- Additional Findings


Additional findings: 





- Constitutional


Appears: Well, Non-toxic, No Acute Distress





- Head Exam


Head Exam: ATRAUMATIC, NORMOCEPHALIC





- Eye Exam


Eye Exam: EOMI





- ENT Exam


ENT Exam: Mucous Membranes Moist





- Neck Exam


Neck Exam: Full ROM





- Respiratory Exam


Respiratory Exam: NORMAL BREATHING PATTERN.  absent: Accessory Muscle Use, 

Respiratory Distress





- GI/Abdominal Exam


GI & Abdominal Exam: Soft.  absent: Tenderness





- Extremities Exam


Extremities Exam: absent: Calf Tenderness, Pedal Edema


Additional comments: 





LUE AV fistula palpable thrill 


Incision site clean/dry/intact, no erythema 





- Neurological Exam


Neurological Exam: Alert, Awake, Oriented x3





Assessment and Plan





- Assessment and Plan (Free Text)


Assessment: 





53 year old male with past medical history ESRD on HD, T2DM, HTN, hepatic 

hemangioma s/p LUE AV fistula placement POD#1. 


Plan: 





- s/p LUE AV fistula placement 


- Renal Diet 


- Percocet 1 tab PO Q4H PRN pain 


- further management as per primary team 





Thank you for the opportunity Surgery will sign off at this time. Please 

reconsult as necessary. 











Yina Alejandre PGY-1





<Darin Clarke - Last Filed: 01/20/19 12:01>





Objective





- Vital Signs/Intake and Output


Vital Signs (last 24 hours): 


                                        











Temp Pulse Resp BP Pulse Ox


 


 98.9 F   86   18   145/73   99 


 


 01/16/19 14:00  01/16/19 17:34  01/16/19 14:00  01/16/19 17:34  01/16/19 14:00











- Labs


Labs: 


                                        





                                 01/16/19 08:04 





                                 01/16/19 08:04 





                                        











PT  12.0 SECONDS (9.4-12.5)   01/14/19  06:35    


 


INR  1.04   01/14/19  06:35    


 


APTT  27.2 Seconds (25.1-36.5)   01/14/19  06:35    














Assessment and Plan





- Assessment and Plan (Free Text)


Plan: 


Patient was seen, examined and evaluated by me. I agree with resident's 

assessment and plan.

## 2019-01-16 NOTE — CARD
--------------- APPROVED REPORT --------------





Date of service: 01/15/2019



EKG Measurement

Heart [id number]

OR [id number]

[id number]

[id number]

[id number]



<Conclusion>

Normal sinus rhythm

NSSTW changes

PRWP

Prolonged QTc

## 2019-01-16 NOTE — CP.PCM.DIS
<RobertAlec Len - Last Filed: 01/16/19 17:49>





Provider





- Provider


Date of Admission: 


01/10/19 00:14





Attending physician: 


Khadra Lee MD





Primary care physician: 


JULIA PRIMARY CARE PROVIDER





Consults: 








01/10/19 00:50


Consult [Physician Consult] Routine 


   Comment: 


   Consulting Provider: Johnny Olivera


   Consulting Physician: Johnny Olivera


   Reason for Consult: black vomitus and black sputum





01/10/19 00:54


Nephrology Consult Stat 


   Comment: 


   Consulting Provider: Otis Anton


   Consulting Physician: Otis Anton


   Reason for Consult: CKD on dialysis





01/10/19 04:10


Diabetic Education Referral Routine 


   Comment: 


   Physician Instructions: 


   Reason For Exam: poor nutrition





01/10/19 04:12


Nursing Referral for Palliative Care Routine 


   Comment: 


   Physician Instructions: 


   Reason For Exam: routine





01/10/19 09:37


Social Work Referral Routine 


   Comment: eval


   Physician Instructions: 


   Reason For Exam: needs assistance setting up outpatient HD





01/11/19 10:10


Consult [Physician Consult] Routine 


   Comment: 


   Consulting Provider: Darin Clarke


   Consulting Physician: Darin Clarke


   Reason for Consult: AV fistula





01/12/19 15:49


Physician Consult Routine 


   Comment: 


   Consulting Provider: William James


   Consulting Physician: William James


   Reason for Consult: cardiac risk stratification for AV fistula placement











Time Spent in preparation of Discharge (in minutes): 45





Diagnosis





- Discharge Diagnosis


(1) CKD (chronic kidney disease)


Status: Chronic   Priority: High   





(2) Anemia


Status: Chronic   Priority: High   





(3) HTN (hypertension)


Status: Chronic   Priority: High   





(4) Diabetes


Status: Chronic   Priority: High   





(5) HLD (hyperlipidemia)


Status: Chronic   Priority: High   





Hospital Course





- Lab Results


Lab Results: 


                                  Micro Results





01/10/19 06:15   Blood   Blood Culture - Final


                            NO GROWTH AFTER 5 DAYS


01/10/19 06:15   Blood   Gram Stain - Final


                            TEST NOT PERFORMED


01/10/19 05:20   Blood   Blood Culture - Final


                            NO GROWTH AFTER 5 DAYS


01/10/19 05:20   Blood   Gram Stain - Final


                            TEST NOT PERFORMED


01/09/19 20:30   Blood   Blood Culture - Final


                            NO GROWTH AFTER 5 DAYS


01/09/19 20:30   Blood   Gram Stain - Final


                            TEST NOT PERFORMED


01/09/19 20:00   Blood   Blood Culture - Final


                            NO GROWTH AFTER 5 DAYS


01/09/19 20:00   Blood   Gram Stain - Final


                            TEST NOT PERFORMED


01/11/19 06:00   Urine Random   Urine Culture - Final


                            No Growth (<1,000 CFU/ML)


01/10/19 03:30   Naris   MRSA Culture (Admit) - Final


                            MRSA NOT DETECTED





                             Most Recent Lab Values











WBC  8.9 10^3/uL (4.5-11.0)  D 01/16/19  08:04    


 


RBC  2.85 10^6/uL (3.5-6.1)  L  01/16/19  08:04    


 


Hgb  8.4 g/dL (14.0-18.0)  L  01/16/19  08:04    


 


Hct  24.2 % (42.0-52.0)  L  01/16/19  08:04    


 


MCV  84.9 fl (80.0-105.0)   01/16/19  08:04    


 


MCH  29.5 pg (25.0-35.0)   01/16/19  08:04    


 


MCHC  34.7 g/dl (31.0-37.0)   01/16/19  08:04    


 


RDW  13.4 % (11.5-14.5)   01/16/19  08:04    


 


Plt Count  175 10^3/uL (120.0-450.0)   01/16/19  08:04    


 


MPV  10.6 fl (7.0-11.0)   01/16/19  08:04    


 


Gran %  71.3 % (50.0-68.0)  H  01/16/19  08:04    


 


Lymph % (Auto)  17.5 % (22.0-35.0)  L  01/16/19  08:04    


 


Mono % (Auto)  7.2 % (1.0-6.0)  H  01/16/19  08:04    


 


Eos % (Auto)  3.8 % (1.5-5.0)   01/16/19  08:04    


 


Baso % (Auto)  0.2 % (0.0-3.0)   01/16/19  08:04    


 


Gran #  6.32  (1.4-6.5)   01/16/19  08:04    


 


Lymph # (Auto)  1.6  (1.2-3.4)   01/16/19  08:04    


 


Mono # (Auto)  0.6  (0.1-0.6)   01/16/19  08:04    


 


Eos # (Auto)  0.3  (0.0-0.7)   01/16/19  08:04    


 


Baso # (Auto)  0.02 K/mm3 (0.0-2.0)   01/16/19  08:04    


 


Retic Count  3.36 % (0.5-1.5)  H  01/14/19  09:50    


 


PT  12.0 SECONDS (9.4-12.5)   01/14/19  06:35    


 


INR  1.04   01/14/19  06:35    


 


APTT  27.2 Seconds (25.1-36.5)   01/14/19  06:35    


 


pCO2  25 mm/Hg (35-45)  L  01/09/19  21:10    


 


pO2  59.0 mm/Hg ()  L  01/09/19  21:10    


 


HCO3  13.8 mmol/L (21-28)  L  01/09/19  21:10    


 


ABG pH  7.35  (7.35-7.45)   01/09/19  21:10    


 


ABG Total CO2  14.6 mmol.L (22-28)  L  01/09/19  21:10    


 


ABG O2 Saturation  93.9 % (95-98)  L  01/09/19  21:10    


 


ABG Base Excess  -10.0 mmol/L (-2.0-3.0)  L  01/09/19  21:10    


 


ABG Potassium  6.5 mmol/L (3.6-5.2)  H*  01/09/19  21:10    


 


Sodium  136.0 mmol/L (132-148)   01/09/19  21:10    


 


Chloride  108.0 mmol/L ()  H  01/09/19  21:10    


 


Glucose  124 mg/dl ()  H  01/09/19  21:10    


 


Lactate  1.0 mmol/L (0.7-2.1)   01/09/19  21:10    


 


FiO2  21.0 %  01/09/19  21:10    


 


Sodium  132 mmol/L (132-148)   01/16/19  08:04    


 


Potassium  4.5 mmol/L (3.6-5.0)   01/16/19  08:04    


 


Chloride  98 mmol/L ()   01/16/19  08:04    


 


Carbon Dioxide  26 mmol/L (21-33)   01/16/19  08:04    


 


Anion Gap  13  (10-20)   01/16/19  08:04    


 


BUN  39 mg/dL (7-21)  H  01/16/19  08:04    


 


Creatinine  7.3 mg/dl (0.8-1.5)  H  01/16/19  08:04    


 


Est GFR ( Amer)  9   01/16/19  08:04    


 


Est GFR (Non-Af Amer)  8   01/16/19  08:04    


 


POC Glucose (mg/dL)  155 mg/dL ()  H  01/16/19  16:14    


 


Random Glucose  109 mg/dL ()   01/16/19  08:04    


 


Calcium  7.9 mg/dL (8.4-10.5)  L  01/16/19  08:04    


 


Phosphorus  6.4 mg/dL (2.5-4.5)  H  01/15/19  06:50    


 


Magnesium  1.6 mg/dL (1.7-2.2)  L  01/10/19  08:37    


 


Total Bilirubin  0.5 mg/dL (0.2-1.3)   01/16/19  08:04    


 


AST  51 U/L (17-59)   01/16/19  08:04    


 


ALT  62 U/L (7-56)  H  01/16/19  08:04    


 


Alkaline Phosphatase  211 U/L ()  H  01/16/19  08:04    


 


Lactate Dehydrogenase  1531 U/L (333-699)  H  01/09/19  20:30    


 


Total Creatine Kinase  5698 U/L ()  H  01/10/19  10:20    


 


CK-MB (CK-2)  13.2 ng/mL (0.0-3.6)  H  01/10/19  10:20    


 


CK-MB (CK-2) %  0.2 % (2.5-3.0)  L  01/10/19  10:20    


 


Troponin I  0.03 ng/mL D 01/10/19  10:20    


 


NT-Pro-B Natriuret Pep  62351 pg/mL (0-450)  H  01/09/19  20:30    


 


Total Protein  6.7 g/dL (5.8-8.3)   01/16/19  08:04    


 


Albumin  3.3 g/dL (3.0-4.8)   01/16/19  08:04    


 


Globulin  3.4 gm/dL  01/16/19  08:04    


 


Albumin/Globulin Ratio  1.0  (1.1-1.8)  L  01/16/19  08:04    


 


Procalcitonin  0.24 NG/ML (0.19-0.49)   01/10/19  05:20    


 


Arterial Blood Potassium  6.5 mmol/L (3.6-5.2)  H*  01/09/19  21:10    


 


Urine Color  Yellow  (YELLOW)   01/11/19  06:00    


 


Urine Appearance  Sl cloudy  (CLEAR)   01/11/19  06:00    


 


Urine pH  6.5  (4.7-8.0)   01/11/19  06:00    


 


Ur Specific Gravity  1.020  (1.005-1.035)   01/11/19  06:00    


 


Urine Protein  >=300 mg/dL (<30 mg/dL)  H  01/11/19  06:00    


 


Urine Glucose (UA)  100 mg/dL (NEGATIVE)  H  01/11/19  06:00    


 


Urine Ketones  Negative mg/dL (NEGATIVE)   01/11/19  06:00    


 


Urine Blood  Moderate  (NEGATIVE)  H  01/11/19  06:00    


 


Urine Nitrate  Negative  (NEGATIVE)   01/11/19  06:00    


 


Urine Bilirubin  Negative  (NEGATIVE)   01/11/19  06:00    


 


Urine Urobilinogen  0.2 E.U./dL (<1 E.U./dL)   01/11/19  06:00    


 


Ur Leukocyte Esterase  Small Anjel/uL (NEGATIVE)  H  01/11/19  06:00    


 


Urine RBC  1 - 3 /hpf (0-2)  H  01/11/19  06:00    


 


Urine WBC  10 - 15 /hpf (0-6)  H  01/11/19  06:00    


 


Ur Epithelial Cells  0 - 2 /hpf (0-5)   01/11/19  06:00    


 


Urine Bacteria  Few /hpf (NONE)   01/11/19  06:00    


 


Urine Other  Usperm /hpf  01/11/19  06:00    


 


Stool Occult Blood  Negative  (NEGATIVE)   01/14/19  09:49    


 


Urine Opiates Screen  Negative  (NEGATIVE)   01/11/19  06:00    


 


Urine Methadone Screen  Negative  (NEGATIVE)   01/11/19  06:00    


 


Ur Barbiturates Screen  Negative  (NEGATIVE)   01/11/19  06:00    


 


Ur Phencyclidine Scrn  Negative  (NEGATIVE)   01/11/19  06:00    


 


Ur Amphetamines Screen  Negative  (NEGATIVE)   01/11/19  06:00    


 


U Benzodiazepines Scrn  Negative  (NEGATIVE)   01/11/19  06:00    


 


U Oth Cocaine Metabols  Negative  (NEGATIVE)   01/11/19  06:00    


 


U Cannabinoids Screen  Negative  (NEGATIVE)   01/11/19  06:00    


 


Hep Bs Antigen  Negative  (NEGATIVE)   01/10/19  08:30    


 


Hep Bs Antibody  Negative  (NEGATIVE)   01/10/19  08:30    


 


Hep B Core IgM Ab  Negative  (NEGATIVE)   01/10/19  08:30    


 


Blood Type  B POSITIVE   01/14/19  06:35    


 


Antibody Screen  Negative   01/14/19  06:35    


 


Crossmatch  See Detail   01/14/19  06:35    


 


BBK History Checked  Patient has bt   01/14/19  06:35    














- Hospital Course


Hospital Course: 





Upon Arrival


Pt is a 54 yo male with PMH of diabetes mellitus type II, HTN, CKD on 

hemodialysis T,Th,Sa noncompliant started on 12/2018, renal mass, hepatic 

hemangioma presents with shortness of breath and cough that started 1 month ago.

Patient reports he was at the hospital and was diagnosed with CKD needing 

dialysis. Tunneled catheter was placed during that admission and 3 round of 

hemodialysis were performed prior to patient leaving AMA. Patient describes 

shortness of breath as episodic and worsening with cold weather and ambulation. 

Patient reports productive cough with yellow sputum that started two weeks ago. 

Sputum color progressed to red and brown with clots at an unknown time. Patient 

has continued to have these symptoms. 





Hospitalization


Pt was treated for his CKD with HD because of his noncompliance. Pt tolerated 

dialysis well Tue, Thurs, Sat. Pt had a fistula surgery with no complications, 

Nephrology consulted Dr Anton. Pt anemia was treated with IV Fe and Epogen.





Discharge


Pt advised to please follow up with new primary care physician (Dr. Beltran) at 

the [facility] for 

2:30pm on January 21, 2019. As per the cardiologist, will need to get an 

echocardiogram, to be done AFTER receiving 2 weeks of regular hemodialysis. As 

per the cardiologist on your last visit, you will need to have an outpatient 

stress test done to assess your cardiac function. Scheduled for dialysis 

tomorrow here at [facility], Please make sure to attend. Please 

follow up with Nephrologist (Dr. Mike) at the [facility]. You 

will be called with an appointment date. If symptoms return, please go to the 

nearest emergency department as quickly as possible 





- Date & Time of H&P


Date of H&P: 01/16/19


Time of H&P: 08:00





Discharge Exam





- Head Exam


Head Exam: ATRAUMATIC, NORMOCEPHALIC





- Eye Exam


Eye Exam: EOMI





- ENT Exam


ENT Exam: Mucous Membranes Moist





- Respiratory Exam


Respiratory Exam: NORMAL BREATHING PATTERN.  absent: Accessory Muscle Use, 

Respiratory Distress





- Cardiovascular Exam


Cardiovascular Exam: RRR, +S1, +S2.  absent: Diastolic murmur, Systolic Murmur





- GI/Abdominal Exam


GI & Abdominal Exam: Normal Bowel Sounds





- Extremities Exam


Extremities exam: full ROM, pedal pulses present


Additional comments: 





fistula left wrist, thrill appreciated 





- Neurological Exam


Neurological exam: Alert, Oriented x3





- Psychiatric Exam


Psychiatric exam: Normal Affect, Normal Mood





- Skin


Skin Exam: Dry, Intact, Warm





Discharge Plan





- Discharge Medications


Prescriptions: 


RX: amLODIPine [Norvasc] 10 mg PO DAILY #10 tab


RX: Atorvastatin [Lipitor] 40 mg PO HS #10 tab


Calcium Acetate [Phoslo] 2,001 mg PO WM #90 tab


RX: Ergocalciferol (Vitamin D2) [Drisdol] 50,000 unit PO THU #4 capsule


RX: Furosemide [Lasix] 80 mg PO MWF #15 tab


RX: Labetalol [Trandate] 200 mg PO BID #20 tab


RX: Losartan [Cozaar] 50 mg PO QPM #10 tab


RX: Vitamin B Complex/Vit C/Folic [Nephro-Dave] 1 tab PO 0800 #30 tab





- Follow Up Plan


Condition: CRITICAL


Disposition: HOME/ ROUTINE


Instructions:  High Blood Pressure (DC), Hyperkalemia (DC), Kidney Failure (DC),

Preparing for Hemodialysis, Influenza Virus Vaccine (Inactivated), Pneumococcal 

Polysaccharide Vaccine (23-Valent)


Additional Instructions: 


1. Please follow up with your new primary care physician (Dr. Beltran) at the 

[facility] for 2:30pm

on January 21, 2019.  Please show up early at 2:00pm to complete your paperwork.




2. As per the cardiologist on your last visit, you will need to get an 

echocardiogram, to be done AFTER receiving 2 weeks of regular hemodialysis.


3. As per the cardiologist on your last visit, you will need to have an 

outpatient stress test done to assess your cardiac function.


4. You are scheduled for dialysis tomorrow here at [facility], 

Please make sure to attend. 


5. Please follow up with Nephrologist (Dr. Mike) at the [facility]. You will be called with an appointment date.


6. If your symptoms return, please go to the nearest emergency department as 

quickly as possible 


Referrals: 


[facility] [place]


(Dr. Beltran (PMD): Jan 21, 2019





Dr. Mike (Nephrology): Date to be assigned)





<Anitra Alfaro R - Last Filed: 01/21/19 13:59>





Provider





- Provider


Date of Admission: 


01/10/19 00:14





Attending physician: 


Khadra Lee MD





Primary care physician: 


NO PRIMARY CARE PROVIDER





Consults: 








01/10/19 00:50


Consult [Physician Consult] Routine 


   Comment: 


   Consulting Provider: Johnny Olivera


   Consulting Physician: Johnny Olivera


   Reason for Consult: black vomitus and black sputum





01/10/19 00:54


Nephrology Consult Stat 


   Comment: 


   Consulting Provider: Otis Anton


   Consulting Physician: Otis Anton


   Reason for Consult: CKD on dialysis





01/10/19 04:10


Diabetic Education Referral Routine 


   Comment: 


   Physician Instructions: 


   Reason For Exam: poor nutrition





01/10/19 04:12


Nursing Referral for Palliative Care Routine 


   Comment: 


   Physician Instructions: 


   Reason For Exam: routine





01/10/19 09:37


Social Work Referral Routine 


   Comment: rickal


   Physician Instructions: 


   Reason For Exam: needs assistance setting up outpatient HD





01/11/19 10:10


Consult [Physician Consult] Routine 


   Comment: 


   Consulting Provider: Darin Clarke


   Consulting Physician: Darin Clarke


   Reason for Consult: AV fistula





01/12/19 15:49


Physician Consult Routine 


   Comment: 


   Consulting Provider: William James


   Consulting Physician: William James


   Reason for Consult: cardiac risk stratification for AV fistula placement














Hospital Course





- Lab Results


Lab Results: 


                                  Micro Results





01/10/19 06:15   Blood   Blood Culture - Final


                            NO GROWTH AFTER 5 DAYS


01/10/19 06:15   Blood   Gram Stain - Final


                            TEST NOT PERFORMED


01/10/19 05:20   Blood   Blood Culture - Final


                            NO GROWTH AFTER 5 DAYS


01/10/19 05:20   Blood   Gram Stain - Final


                            TEST NOT PERFORMED


01/09/19 20:30   Blood   Blood Culture - Final


                            NO GROWTH AFTER 5 DAYS


01/09/19 20:30   Blood   Gram Stain - Final


                            TEST NOT PERFORMED


01/09/19 20:00   Blood   Blood Culture - Final


                            NO GROWTH AFTER 5 DAYS


01/09/19 20:00   Blood   Gram Stain - Final


                            TEST NOT PERFORMED


01/11/19 06:00   Urine Random   Urine Culture - Final


                            No Growth (<1,000 CFU/ML)


01/10/19 03:30   Naris   MRSA Culture (Admit) - Final


                            MRSA NOT DETECTED





                             Most Recent Lab Values











WBC  8.9 10^3/uL (4.5-11.0)  D 01/16/19  08:04    


 


RBC  2.85 10^6/uL (3.5-6.1)  L  01/16/19  08:04    


 


Hgb  8.4 g/dL (14.0-18.0)  L  01/16/19  08:04    


 


Hct  24.2 % (42.0-52.0)  L  01/16/19  08:04    


 


MCV  84.9 fl (80.0-105.0)   01/16/19  08:04    


 


MCH  29.5 pg (25.0-35.0)   01/16/19  08:04    


 


MCHC  34.7 g/dl (31.0-37.0)   01/16/19  08:04    


 


RDW  13.4 % (11.5-14.5)   01/16/19  08:04    


 


Plt Count  175 10^3/uL (120.0-450.0)   01/16/19  08:04    


 


MPV  10.6 fl (7.0-11.0)   01/16/19  08:04    


 


Gran %  71.3 % (50.0-68.0)  H  01/16/19  08:04    


 


Lymph % (Auto)  17.5 % (22.0-35.0)  L  01/16/19  08:04    


 


Mono % (Auto)  7.2 % (1.0-6.0)  H  01/16/19  08:04    


 


Eos % (Auto)  3.8 % (1.5-5.0)   01/16/19  08:04    


 


Baso % (Auto)  0.2 % (0.0-3.0)   01/16/19  08:04    


 


Gran #  6.32  (1.4-6.5)   01/16/19  08:04    


 


Lymph # (Auto)  1.6  (1.2-3.4)   01/16/19  08:04    


 


Mono # (Auto)  0.6  (0.1-0.6)   01/16/19  08:04    


 


Eos # (Auto)  0.3  (0.0-0.7)   01/16/19  08:04    


 


Baso # (Auto)  0.02 K/mm3 (0.0-2.0)   01/16/19  08:04    


 


Retic Count  3.36 % (0.5-1.5)  H  01/14/19  09:50    


 


PT  12.0 SECONDS (9.4-12.5)   01/14/19  06:35    


 


INR  1.04   01/14/19  06:35    


 


APTT  27.2 Seconds (25.1-36.5)   01/14/19  06:35    


 


pCO2  25 mm/Hg (35-45)  L  01/09/19  21:10    


 


pO2  59.0 mm/Hg ()  L  01/09/19  21:10    


 


HCO3  13.8 mmol/L (21-28)  L  01/09/19  21:10    


 


ABG pH  7.35  (7.35-7.45)   01/09/19  21:10    


 


ABG Total CO2  14.6 mmol.L (22-28)  L  01/09/19  21:10    


 


ABG O2 Saturation  93.9 % (95-98)  L  01/09/19  21:10    


 


ABG Base Excess  -10.0 mmol/L (-2.0-3.0)  L  01/09/19  21:10    


 


ABG Potassium  6.5 mmol/L (3.6-5.2)  H*  01/09/19  21:10    


 


Sodium  136.0 mmol/L (132-148)   01/09/19  21:10    


 


Chloride  108.0 mmol/L ()  H  01/09/19  21:10    


 


Glucose  124 mg/dl ()  H  01/09/19  21:10    


 


Lactate  1.0 mmol/L (0.7-2.1)   01/09/19  21:10    


 


FiO2  21.0 %  01/09/19  21:10    


 


Sodium  132 mmol/L (132-148)   01/16/19  08:04    


 


Potassium  4.5 mmol/L (3.6-5.0)   01/16/19  08:04    


 


Chloride  98 mmol/L ()   01/16/19  08:04    


 


Carbon Dioxide  26 mmol/L (21-33)   01/16/19  08:04    


 


Anion Gap  13  (10-20)   01/16/19  08:04    


 


BUN  39 mg/dL (7-21)  H  01/16/19  08:04    


 


Creatinine  7.3 mg/dl (0.8-1.5)  H  01/16/19  08:04    


 


Est GFR ( Amer)  9   01/16/19  08:04    


 


Est GFR (Non-Af Amer)  8   01/16/19  08:04    


 


POC Glucose (mg/dL)  155 mg/dL ()  H  01/16/19  16:14    


 


Random Glucose  109 mg/dL ()   01/16/19  08:04    


 


Calcium  7.9 mg/dL (8.4-10.5)  L  01/16/19  08:04    


 


Phosphorus  6.4 mg/dL (2.5-4.5)  H  01/15/19  06:50    


 


Magnesium  1.6 mg/dL (1.7-2.2)  L  01/10/19  08:37    


 


Total Bilirubin  0.5 mg/dL (0.2-1.3)   01/16/19  08:04    


 


AST  51 U/L (17-59)   01/16/19  08:04    


 


ALT  62 U/L (7-56)  H  01/16/19  08:04    


 


Alkaline Phosphatase  211 U/L ()  H  01/16/19  08:04    


 


Lactate Dehydrogenase  1531 U/L (333-699)  H  01/09/19  20:30    


 


Total Creatine Kinase  5698 U/L ()  H  01/10/19  10:20    


 


CK-MB (CK-2)  13.2 ng/mL (0.0-3.6)  H  01/10/19  10:20    


 


CK-MB (CK-2) %  0.2 % (2.5-3.0)  L  01/10/19  10:20    


 


Troponin I  0.03 ng/mL D 01/10/19  10:20    


 


NT-Pro-B Natriuret Pep  65454 pg/mL (0-450)  H  01/09/19  20:30    


 


Total Protein  6.7 g/dL (5.8-8.3)   01/16/19  08:04    


 


Albumin  3.3 g/dL (3.0-4.8)   01/16/19  08:04    


 


Globulin  3.4 gm/dL  01/16/19  08:04    


 


Albumin/Globulin Ratio  1.0  (1.1-1.8)  L  01/16/19  08:04    


 


Procalcitonin  0.24 NG/ML (0.19-0.49)   01/10/19  05:20    


 


Arterial Blood Potassium  6.5 mmol/L (3.6-5.2)  H*  01/09/19  21:10    


 


Urine Color  Yellow  (YELLOW)   01/11/19  06:00    


 


Urine Appearance  Sl cloudy  (CLEAR)   01/11/19  06:00    


 


Urine pH  6.5  (4.7-8.0)   01/11/19  06:00    


 


Ur Specific Gravity  1.020  (1.005-1.035)   01/11/19  06:00    


 


Urine Protein  >=300 mg/dL (<30 mg/dL)  H  01/11/19  06:00    


 


Urine Glucose (UA)  100 mg/dL (NEGATIVE)  H  01/11/19  06:00    


 


Urine Ketones  Negative mg/dL (NEGATIVE)   01/11/19  06:00    


 


Urine Blood  Moderate  (NEGATIVE)  H  01/11/19  06:00    


 


Urine Nitrate  Negative  (NEGATIVE)   01/11/19  06:00    


 


Urine Bilirubin  Negative  (NEGATIVE)   01/11/19  06:00    


 


Urine Urobilinogen  0.2 E.U./dL (<1 E.U./dL)   01/11/19  06:00    


 


Ur Leukocyte Esterase  Small Anjel/uL (NEGATIVE)  H  01/11/19  06:00    


 


Urine RBC  1 - 3 /hpf (0-2)  H  01/11/19  06:00    


 


Urine WBC  10 - 15 /hpf (0-6)  H  01/11/19  06:00    


 


Ur Epithelial Cells  0 - 2 /hpf (0-5)   01/11/19  06:00    


 


Urine Bacteria  Few /hpf (NONE)   01/11/19  06:00    


 


Urine Other  Usperm /hpf  01/11/19  06:00    


 


Stool Occult Blood  Negative  (NEGATIVE)   01/14/19  09:49    


 


Urine Opiates Screen  Negative  (NEGATIVE)   01/11/19  06:00    


 


Urine Methadone Screen  Negative  (NEGATIVE)   01/11/19  06:00    


 


Ur Barbiturates Screen  Negative  (NEGATIVE)   01/11/19  06:00    


 


Ur Phencyclidine Scrn  Negative  (NEGATIVE)   01/11/19  06:00    


 


Ur Amphetamines Screen  Negative  (NEGATIVE)   01/11/19  06:00    


 


U Benzodiazepines Scrn  Negative  (NEGATIVE)   01/11/19  06:00    


 


U Oth Cocaine Metabols  Negative  (NEGATIVE)   01/11/19  06:00    


 


U Cannabinoids Screen  Negative  (NEGATIVE)   01/11/19  06:00    


 


Hep Bs Antigen  Negative  (NEGATIVE)   01/10/19  08:30    


 


Hep Bs Antibody  Negative  (NEGATIVE)   01/10/19  08:30    


 


Hep B Core IgM Ab  Negative  (NEGATIVE)   01/10/19  08:30    


 


Blood Type  B POSITIVE   01/14/19  06:35    


 


Antibody Screen  Negative   01/14/19  06:35    


 


Crossmatch  See Detail   01/14/19  06:35    


 


BBK History Checked  Patient has bt   01/14/19  06:35    














Attending/Attestation





- Attestation


I have personally seen and examined this patient.: Yes


I have fully participated in the care of the patient.: Yes


I have reviewed all pertinent clinical information, including history, physical 

exam and plan: Yes


Notes (Text): 


Please note this DC summary is for 1/16/19


Patient seen and examined by me with resident 11:25AM and prior to discharge on 

1/16/19. Case including discharge plan discussed with resident. Agree with 

above with following additions/corrections.


Patient is a 44-year-old male with past medical history significant for cocaine 

abuse, alcohol use, tobacco abuse, chest pain, and tooth pain presented to the 

emergency room with left-sided chest pain. Please see H&P for full details. 


Patient was admitted with pulmonary edema, acute renal failure, hemoptysis and 

hematemesis secondary to uremic platelet dysfunction, rhabdomyolysis, elevated 

BNP, anemia, hyperkalemia, low bicarbonate, hypocalcemia, elevated LFTS, 

hypoxemia and hypocarbia secondary to pulmonary edema, hypertension, 

hyperlipidemia, and DM type 2. Patient was admitted to ICU. BUN on admission was

117, creatinine was 16.7. Potassium was 6.6. Calcium was 6.2. ProBNP was 26,700.

Patient was seen by nephrologist and patient was started on dialysis. Patient 

improved with continued dialysis. Hyperkalemia resolved. Uremia resolved. BUN/Cr

improved. Shortness of breath and fluid overload improved. Patient was seen by 

surgical team and AV fistula was placed left wrist. Patient was placed on Lasix 

MWF. Patient was continued on nephrovite and phoslo. Patient was placed on iron 

infusions for anemia and was started on Aranesp weekly. Patient was transfused 

one unit PRBC on 1/14/19. Stool for occult blood was negative. Patient was seen 

by GI on admission and per GI there was no acute evidence of any kind of GI 

bleeding. Endoscopic evaluation recommended as an outpatient. Patient was 

treated with Norvasc, Lasix, Labetalol, and Cozaar for hypertension. Patient was

treated with lipitor for hyperlipidemia. Patient was constipated and was started

on senna with resolution. Blood and urine cultures were negative. Patient 

remained afebrile. Patient had intermittent leukocytosis which resolved. 

Procalcitonin was 0.24. BUN/Cr on discharge was 36/7.3. HgbA1C on 12/4/18 was 

6.1%. Patient was counseled on diet and exercise. Patient also advised that she 

should have repeat testing in 2 months. Patients symptoms resolved. Patient was

feeling much better. No shortness of breath and was ambulating well. Patient was

scheduled with an outpatient dialysis center. Patient was cleared for discharge 

by all consultants. Patient was discharged home. 


On day of discharge, patient stated he was feeling well. Patient stated he had 

mild intermittent pain at AV fistula site. Patient denied shortness of breath. 

No chest pain or palpitations. No abdominal pain. No nausea or vomiting. No 

headache or dizziness. No change in vision or light headedness. No fevers or 

chills. No diarrhea or constipation. Patient tolerating diet and ambulating 

well. Patient stated he understands he has to go to dialysis as scheduled and 

has to follow up with primary care doctor and nephrologist. 


Physical exam:


General: Awake and alert sitting up in bed in no acute distress


HEENT: Normocephalic, atraumatic. Extraocular muscles intact, pupils equal and 

reactive, no scleral icterus. Oropharynx is pink and moist. No pharyngeal 

erythema or exudate appreciated. Neck is supple. 


Cardiovascular: Regular rhythm. Normal S1 and S2. No murmurs, rubs, or gallops 

appreciated


Pulmonary: Normal respiratory effort. No rhonchi, rales, or wheezing 

appreciated.


Gastrointestinal: Soft, nondistended. Nontender. Positive bowel sounds all 4 

quadrants. No guarding. 


Musculoskeletal:  Moves all extremities. No calf tenderness. No edema 

appreciated. Positive AV fistula with thrill and bruit present left wrist.


Central nervous system: AAOx3, CN 2-12 grossly intact. 


Dermatologic:  Skin warm and dry. Left wrist area incision site clean, dry, and 

intact.





Please see chart for full details.





Follow up instructions: Patient to follow up with [facility] on 1/21/19 at 2:30PM. 

Patient to have repeat echo after 2 weeks of receiving HD. Patient to will also 

need stress test. Patient to continue scheduled HD. All instructions explained 

to the patient in detail. Patient both understands and agrees to all 

instructions. Written instructions also given.





Time spent in discharging the patient including chart review, medication 

reconciliation, discussion with the patient, medical resident, consultants, and 

nursing staff was approximately 40 minutes.

## 2019-01-18 NOTE — PCM.OP
<Sheri Robbins - Last Filed: 01/18/19 19:35>





Operative Report





- Operative Report


Date of Surgery/Procedure: 01/15/19


Time of Surgery/Procedure: 08:00


Surgeon: Dr. Clarke


Assistant: Sheri robbins


Anesthesia/Sedation: 





General anesthesia


Pre-Operative Diagnosis: 





End-stage renal disease


Post-Operative Diagnosis: 





End-stage renal disease


Indication for Surgery: 





End-stage renal disease: 


The patient has a hemodialysis catheter traversed in the right internal jugular 

vein. The right cephalic vein and right radial artery were judged to be suitable

for usage on vein mapping


Operative Findings: 





3mm right cephalic vein and 2mm right radial artery were judged to be suitable 

for usage


Procedure/Operation Description: 





After informed consent was obtained, the patient was taken to the operating 

room. The patient was placed in the supine position. We used ultrasound to l

ocate the cephalic vein at the wrist.


The right arm was prepped and draped in the usual sterile fashion. 


A small transverse incision was made near the cephalic vein. The cephalic vein 

was identified and immobilized. The cephalic vein was of a good size. The radial

artery was identified and immobilized. The radial artery was normal. We then 

divided the cephalic vein proximally. The proximal end was ligated using silk 

suture. The radial artery was clamped proximally and distally. We gave heparin. 

A small longitudinal arteriotomy was made in the radial artery. The end of the 

cephalic vein was then sewn end-to-side to the brachial artery using a running 

6-0 Prolene suture. 


Anastomosis was completed. Flow was then established. Flow was felt in the 

outflow cephalic fistula. Hemostasis was secured. Doppler was used and distal 

radial pulses present. The wound was then closed in layers using interrupted 

vicyl sutures for dermal layer and a running 4-0 Monocryl subcuticular suture 

for the skin. Dermabond and sterile dry dressing was applied.





The patient tolerated the procedure well. There were no operative complications.

The sponge, instrument, and needle counts were correct at the end of the case. 

Dr. Clarke was present and participated in all aspects of the procedure.  


Estimated Blood Loss: 





30


Complications: 





None


Discharge & Condition: 





The patient was transferred to the recovery room in satisfactory condition.





<Darin Clarke - Last Filed: 01/20/19 13:15>





Operative Report





- Operative Report


Procedure/Operation Description: 


Ultrasound was used to map the course of the cephalic vein and its branches. 

Prior to starting the procedure a standard timeout procedure took place where 

everybody in the room agreed as to the patient's identity, diagnosis and 

procedure to be done. Markings of the appropriate laterality of this procedure 

was also confirmed.

## 2019-01-21 NOTE — OP
PROCEDURE DATE:  01/20/2019



PREOPERATIVE DIAGNOSIS:  End-stage renal disease.



POSTOPERATIVE DIAGNOSIS:  End-stage renal disease.



PROCEDURES PERFORMED:

1.  Left wrist arteriovenous fistula creation between the radial artery and

cephalic vein.

2.  Venous mapping of the left arm.



SURGEON:  Darin Clarke MD



ANESTHESIOLOGIST:  Dr. Carlton



TYPE OF ANESTHESIA:  General endotracheal anesthesia.



ESTIMATED BLOOD LOSS:  Minimal.



SPECIMEN:  None.



INDICATIONS:  The patient is a 53-year-old male with history of worsening

renal failure, currently on dialysis via the tunneled hemodialysis

catheter.  The patient has a history of hypertension, type II diabetes, and

was admitted with CHF due to noncompliance.



DESCRIPTION OF PROCEDURE:  The patient was brought to the operating room

and placed on the operating table in supine position.  The patient was

connected to the EKG, blood pressure, and pulse oximetry monitors.  The

patient then underwent general endotracheal anesthesia and was prepped and

draped in the usual sterile fashion.



First, standard time-out procedure took place and everybody in the room

agreed as to the patient's identity, diagnoses and procedure to be

performed.  A correct laterality for the marking of the surgical site was

confirmed.



Using ultrasound probe, the Doppler of the left arm was performed.  The

course of the cephalic vein was clearly marked along its course from the

upper arm down to the wrist.  All the side branches were also marked with

the marker.  Next, the radial artery was also marked as to its position.



Now using lidocaine mixed with Marcaine, the subcutaneous injection was

done in order to ____ skin for the incision at the wrist.  A transverse

incision was made overlying the cephalic vein and radial artery and

carefully flaps were raised inferiorly and superiorly from that incision. 

The cephalic vein was identified and elevated on a Penrose drain.  It was

freed up past the incision and proximally as well.  A couple of small side

branches were taken down and ligated using 4-0 silk ties.  The radial

artery was also identified and dissected out and elevated on two vessel

loops.  Once this was done, we then proceeded with administering 4000 units

of heparin by the anesthesiologist to the patient.  Once 2 minutes time

passed, we then proceeded with passing the artery and creating an

arteriotomy longitudinally for about 1 cm and 2 mm.  The vein was then cut

appropriate due to the length, and at the angle noted, to create

appropriate position to the artery.  Now, a standard anastomosis was

created using 6-0 Prolene in a running fashion between end of the vein and

side of the artery.  Once the anastomosis was completed, the forward flow

was established, the anastomosis was vented and completed with suture.  The

forward flow was established after venting the back flow to the vein. 

There was palpable thrill at the proximal portion of the vein.  We also had

a very good Doppler signal in the distal portion of the vein indicating

turbulent flow.  We then proceeded with irrigation of the wound and closure

of the wound using 3-0 Vicryl deep dermal layer and 4-0 Monocryl for the

skin.  A sterile Dermabond dressing was applied to the wound.  The patient

tolerated the procedure well and there were no complications.  The patient

was awakened, extubated, and transferred to the recovery room for further

observation.





__________________________________________

Darin Clarke MD





DD:  01/20/2019 21:29:35

DT:  01/20/2019 23:54:21

Job # [id number]

## 2019-10-07 NOTE — RAD
Date of service: 



01/09/2019



HISTORY:

 SOB 



COMPARISON:

Portable chest 11/29/2018. 



FINDINGS:



LUNGS:

Interval deployment of a right center venous dialysis catheter is 

identified placed by an apparent right jugular jugular approach with 

tip terminating at the 3 vena cava and right atrium.  Catheter 

appears tunneled.



No acute infiltrate identified bilaterally.



PLEURA:

No pneumothorax bilaterally or right pleural effusion.  Trace left 

pleural effusion evident.



CARDIOVASCULAR:

No aortic atherosclerotic calcification present.



Normal cardiac size. No pulmonary vascular congestion. 



OSSEOUS STRUCTURES:

No significant abnormalities.



VISUALIZED UPPER ABDOMEN:

Normal.



OTHER FINDINGS:

None.



IMPRESSION:

Trace left pleural effusion.  No infiltrate bilaterally.  Tunneled 

central venous dialysis catheter in situ right chest. yes